# Patient Record
Sex: FEMALE | Race: ASIAN | NOT HISPANIC OR LATINO | Employment: PART TIME | URBAN - METROPOLITAN AREA
[De-identification: names, ages, dates, MRNs, and addresses within clinical notes are randomized per-mention and may not be internally consistent; named-entity substitution may affect disease eponyms.]

---

## 2017-01-26 ENCOUNTER — PRENATAL OFFICE VISIT (OUTPATIENT)
Dept: OBGYN | Facility: CLINIC | Age: 37
End: 2017-01-26
Payer: COMMERCIAL

## 2017-01-26 VITALS
HEIGHT: 62 IN | TEMPERATURE: 97 F | WEIGHT: 128.6 LBS | BODY MASS INDEX: 23.66 KG/M2 | DIASTOLIC BLOOD PRESSURE: 58 MMHG | HEART RATE: 74 BPM | SYSTOLIC BLOOD PRESSURE: 93 MMHG

## 2017-01-26 DIAGNOSIS — Z34.00 SUPERVISION OF NORMAL FIRST PREGNANCY, ANTEPARTUM: Primary | ICD-10-CM

## 2017-01-26 DIAGNOSIS — N89.8 VAGINAL DISCHARGE: ICD-10-CM

## 2017-01-26 DIAGNOSIS — B37.31 YEAST INFECTION OF THE VAGINA: ICD-10-CM

## 2017-01-26 DIAGNOSIS — R30.0 DYSURIA: ICD-10-CM

## 2017-01-26 LAB
ALBUMIN UR-MCNC: NEGATIVE MG/DL
APPEARANCE UR: CLEAR
BILIRUB UR QL STRIP: NEGATIVE
COLOR UR AUTO: YELLOW
GLUCOSE UR STRIP-MCNC: NEGATIVE MG/DL
HGB UR QL STRIP: NEGATIVE
KETONES UR STRIP-MCNC: NEGATIVE MG/DL
LEUKOCYTE ESTERASE UR QL STRIP: NEGATIVE
MICRO REPORT STATUS: ABNORMAL
NITRATE UR QL: NEGATIVE
PH UR STRIP: 6 PH (ref 5–7)
SP GR UR STRIP: 1.01 (ref 1–1.03)
SPECIMEN SOURCE: ABNORMAL
URN SPEC COLLECT METH UR: NORMAL
UROBILINOGEN UR STRIP-ACNC: 0.2 EU/DL (ref 0.2–1)
WET PREP SPEC: ABNORMAL

## 2017-01-26 PROCEDURE — 81003 URINALYSIS AUTO W/O SCOPE: CPT | Performed by: OBSTETRICS & GYNECOLOGY

## 2017-01-26 PROCEDURE — 99207 ZZC PRENATAL VISIT: CPT | Performed by: OBSTETRICS & GYNECOLOGY

## 2017-01-26 PROCEDURE — 87210 SMEAR WET MOUNT SALINE/INK: CPT | Performed by: OBSTETRICS & GYNECOLOGY

## 2017-01-26 RX ORDER — TERCONAZOLE 0.4 %
1 CREAM WITH APPLICATOR VAGINAL AT BEDTIME
Qty: 45 G | Refills: 0 | Status: SHIPPED | OUTPATIENT
Start: 2017-01-26 | End: 2017-02-02

## 2017-01-26 NOTE — PROGRESS NOTES
23w6d feeling ok overall, but for the past week or so she has noticed increased vaginal discharge, itching irritation and some burning with urination.  Wet prep: yeast, rx for terazol faxed.   UA: negative   glucola next visit.  RTC 4 weeks  veronika

## 2017-02-24 ENCOUNTER — HOSPITAL ENCOUNTER (OUTPATIENT)
Dept: ULTRASOUND IMAGING | Facility: CLINIC | Age: 37
Discharge: HOME | End: 2017-02-24
Attending: OBSTETRICS & GYNECOLOGY | Admitting: OBSTETRICS & GYNECOLOGY
Payer: COMMERCIAL

## 2017-02-24 ENCOUNTER — PRENATAL OFFICE VISIT (OUTPATIENT)
Dept: OBGYN | Facility: CLINIC | Age: 37
End: 2017-02-24
Payer: COMMERCIAL

## 2017-02-24 VITALS
BODY MASS INDEX: 24.31 KG/M2 | DIASTOLIC BLOOD PRESSURE: 62 MMHG | HEART RATE: 78 BPM | WEIGHT: 132.9 LBS | SYSTOLIC BLOOD PRESSURE: 92 MMHG | TEMPERATURE: 98.9 F

## 2017-02-24 DIAGNOSIS — Z34.00 SUPERVISION OF NORMAL FIRST PREGNANCY, ANTEPARTUM: ICD-10-CM

## 2017-02-24 DIAGNOSIS — M79.662 PAIN OF LEFT CALF: ICD-10-CM

## 2017-02-24 DIAGNOSIS — Z23 NEED FOR TDAP VACCINATION: Primary | ICD-10-CM

## 2017-02-24 DIAGNOSIS — Z34.00 SUPERVISION OF NORMAL FIRST PREGNANCY, ANTEPARTUM: Primary | ICD-10-CM

## 2017-02-24 LAB
GLUCOSE 1H P 50 G GLC PO SERPL-MCNC: 195 MG/DL (ref 60–129)
HGB BLD-MCNC: 10.5 G/DL (ref 11.7–15.7)
VZV IGG SER QL IA: 2.1 AI (ref 0–0.8)

## 2017-02-24 PROCEDURE — 93971 EXTREMITY STUDY: CPT | Mod: LT

## 2017-02-24 PROCEDURE — 82950 GLUCOSE TEST: CPT | Performed by: OBSTETRICS & GYNECOLOGY

## 2017-02-24 PROCEDURE — 36415 COLL VENOUS BLD VENIPUNCTURE: CPT | Performed by: OBSTETRICS & GYNECOLOGY

## 2017-02-24 PROCEDURE — 00000218 ZZHCL STATISTIC OBHBG - HEMOGLOBIN: Performed by: OBSTETRICS & GYNECOLOGY

## 2017-02-24 PROCEDURE — 86787 VARICELLA-ZOSTER ANTIBODY: CPT | Performed by: OBSTETRICS & GYNECOLOGY

## 2017-02-24 PROCEDURE — 90715 TDAP VACCINE 7 YRS/> IM: CPT | Performed by: OBSTETRICS & GYNECOLOGY

## 2017-02-24 PROCEDURE — 90472 IMMUNIZATION ADMIN EACH ADD: CPT | Performed by: OBSTETRICS & GYNECOLOGY

## 2017-02-24 PROCEDURE — 90471 IMMUNIZATION ADMIN: CPT | Performed by: OBSTETRICS & GYNECOLOGY

## 2017-02-24 PROCEDURE — 99213 OFFICE O/P EST LOW 20 MIN: CPT | Mod: 25 | Performed by: OBSTETRICS & GYNECOLOGY

## 2017-02-24 NOTE — MR AVS SNAPSHOT
After Visit Summary   2/24/2017    Liam Ellis    MRN: [id number]           Patient Information     Date Of Birth          1980        Visit Information        Provider Department      2/24/2017 9:30 AM Shahida Raphael MD [facility]        Today's Diagnoses     Need for Tdap vaccination    -  1    Supervision of normal first pregnancy, antepartum        Pain of left calf           Follow-ups after your visit        Your next 10 appointments already scheduled     Feb 24, 2017 11:00 AM CST   US LOWER EXTREMITY VENOUS DUPLEX LEFT with URUS1   [facility], Ultrasound ([facility])    [address] [address] [phone]           Please bring a list of your medicines (including vitamins, minerals and over-the-counter drugs). Also, tell your doctor about any allergies you may have. Wear comfortable clothes and leave your valuables at home.  You do not need to do anything special to prepare for your exam.  Please call the Imaging Department at your exam site with any questions.              Future tests that were ordered for you today     Open Future Orders        Priority Expected Expires Ordered    US Lower Extremity Venous Duplex Left Routine  2/24/2018 2/24/2017            Who to contact     If you have questions or need follow up information about today's clinic visit or your schedule please contact [facility] directly at [phone].  Normal or non-critical lab and imaging results will be communicated to you by MyChart, letter or phone within 4 business days after the clinic has received the results. If you do not hear from us within 7 days, please contact the clinic through MyChart or phone. If you have a critical or abnormal lab result, we will notify you by phone as soon as possible.  Submit refill requests through Tizra or call your pharmacy and they will forward the refill  request to us. Please allow 3 business days for your refill to be completed.          Additional Information About Your Visit        SurgiLighthart Information     Restlet gives you secure access to your electronic health record. If you see a primary care provider, you can also send messages to your care team and make appointments. If you have questions, please call your primary care clinic.  If you do not have a primary care provider, please call [phone] and they will assist you.        Care EveryWhere ID     This is your Care EveryWhere ID. This could be used by other organizations to access your [place] medical records  [id number]        Your Vitals Were     Pulse Temperature Last Period BMI (Body Mass Index)          78 98.9  F (37.2  C) (Oral) 08/12/2016 24.31 kg/m2         Blood Pressure from Last 3 Encounters:   02/24/17 92/62   01/26/17 93/58   12/30/16 (!) 89/61    Weight from Last 3 Encounters:   02/24/17 132 lb 14.4 oz (60.3 kg)   01/26/17 128 lb 9.6 oz (58.3 kg)   12/30/16 123 lb 6.4 oz (56 kg)              We Performed the Following     Glucose tolerance gest screen 1 hour     OB hemoglobin     TDAP (ADACEL AGES 11-64)     VACCINE ADMINISTRATION, INITIAL     Varicella Zoster Virus Antibody IgG        Primary Care Provider Office Phone # Fax #    ELLEN Stanford [phone]       [facility] [address]  [facility] [address]        Thank you!     Thank you for choosing [facility]  for your care. Our goal is always to provide you with excellent care. Hearing back from our patients is one way we can continue to improve our services. Please take a few minutes to complete the written survey that you may receive in the mail after your visit with us. Thank you!             Your Updated Medication List - Protect others around you: Learn how to safely use, store and throw away your medicines at www.disposemymeds.org.          This list is accurate as of:  2/24/17  9:42 AM.  Always use your most recent med list.                   Brand Name Dispense Instructions for use    prenatal multivitamin  plus iron 27-0.8 MG Tabs per tablet      Take 1 tablet by mouth daily

## 2017-02-24 NOTE — PROGRESS NOTES
28w0d feeling ok, but developed another bruise and tender lump on left calf.  When she was stretching during the night, she noticed pain, like a cramp in her left calf, in the morning, she had a bruise and a tender lump in that area and it is persisting.  She had this earlier in pregnancy, had doppler which ruled out DVT.  On exam, she has ecchymosis of left calf with tender cord.  Likely a superficial phlebitis, but recommend doppler today and she is set up to go over after glucola.  Good fm.  tdap today.  rtc 4 weeks  jlp

## 2017-02-25 ASSESSMENT — PATIENT HEALTH QUESTIONNAIRE - PHQ9: SUM OF ALL RESPONSES TO PHQ QUESTIONS 1-9: 6

## 2017-03-02 DIAGNOSIS — O24.419 GESTATIONAL DIABETES: Primary | ICD-10-CM

## 2017-03-02 DIAGNOSIS — Z34.00 SUPERVISION OF NORMAL FIRST PREGNANCY, ANTEPARTUM: ICD-10-CM

## 2017-03-02 PROCEDURE — 82952 GTT-ADDED SAMPLES: CPT | Performed by: OBSTETRICS & GYNECOLOGY

## 2017-03-02 PROCEDURE — 82951 GLUCOSE TOLERANCE TEST (GTT): CPT | Performed by: OBSTETRICS & GYNECOLOGY

## 2017-03-02 PROCEDURE — 36415 COLL VENOUS BLD VENIPUNCTURE: CPT | Performed by: OBSTETRICS & GYNECOLOGY

## 2017-03-03 LAB
GLUCOSE 1H P 100 G GLC PO SERPL-MCNC: 191 MG/DL (ref 60–179)
GLUCOSE 2H P 100 G GLC PO SERPL-MCNC: 185 MG/DL (ref 60–154)
GLUCOSE 3H P 100 G GLC PO SERPL-MCNC: 146 MG/DL (ref 60–139)
GLUCOSE P FAST SERPL-MCNC: 85 MG/DL (ref 60–94)

## 2017-03-07 ENCOUNTER — ALLIED HEALTH/NURSE VISIT (OUTPATIENT)
Dept: EDUCATION SERVICES | Facility: CLINIC | Age: 37
End: 2017-03-07
Payer: COMMERCIAL

## 2017-03-07 VITALS — WEIGHT: 138.6 LBS | HEIGHT: 63 IN | BODY MASS INDEX: 24.56 KG/M2

## 2017-03-07 DIAGNOSIS — O24.419 GDM (GESTATIONAL DIABETES MELLITUS): Primary | ICD-10-CM

## 2017-03-07 PROCEDURE — 99207 ZZC NO CHARGE LOS: CPT

## 2017-03-07 PROCEDURE — G0109 DIAB MANAGE TRN IND/GROUP: HCPCS

## 2017-03-07 NOTE — Clinical Note
Tristen Wyatt,  Sly MORALES that this GDM plans to follow up with you next Friday.  Thanks! Merline Matos RD, LD, CDE

## 2017-03-07 NOTE — PATIENT INSTRUCTIONS
1. Check blood sugar 4 times a day, before breakfast and 1 hour after the start of each meal.     2. Check urine ketones when you wake up every morning for 7 days. If negative everyday, reduce testing to once a week.    3. Follow the recommended meal plan: eat something every 2-3 hours, include protein/fat and carbohydrate at every meal and snack, have 30gm carbs at breakfast, 60gm carbs at lunch, 60gm carbs at supper, 15-30 gm carbs at 3 snacks per day.     4. Add activity to every day, try walking or being active after each meal to help control blood sugar levels.    5.Call or e-mail educator if 3 or more blood sugars are above goal in 1 week. Call or e-mail with questions or concerns.      FOLLOW UP: Friday, March 17th at 9am at [facility]    Merline Matos RD, LD, CDE   [phone]    [place] [facility] for the [facility]:  For Your Diabetes Education or Nutrition Appointments Call:  [phone]   For Diabetes Education and Nutrition Related Questions:   Phone: [phone]  E-mail: [email or web address]  Fax: [phone]   If you need a medication refill please contact your pharmacy. Please allow 3 business days for your refills to be completed.     Instructions for emailing to the Diabetes Educator    If you need to communicate a non-urgent message with a Diabetes Educator via email, please send to [email or web address].    Please follow the following email guidelines:    Subject line: Secure: clinic name  Body of email include: First name, middle initial, last name and date of birth.    We will be in touch with you within one (1) business days.

## 2017-03-07 NOTE — PROGRESS NOTES
"Diabetes Self-Management Training - Gestational Diabetes    SUBJECTIVE/OBJECTIVE:  Liam Ellis presents today for education related to gestational diabetes.  She is accompanied by self    Patient's gestational diabetes management related comments/concerns: none noted    Patient's emotional response to diabetes: expresses readiness to learn    Ht 5' 2.5\" (1.588 m)  Wt 138 lb 9.6 oz (62.9 kg)  LMP 2016  BMI 24.95 kg/m2    Pre pregnancy weight: 115#    Weight gain 23.6 lbs at 29.5 weeks gestation.    Estimated Date of Delivery: May 19, 2017    Lab Results   Component Value Date    GLC 85 2011       1 hour OGTT: 195 on 17  3 hour OGTT: Fastin; 1 hr: 191; 2 hr: 185, 3 hr: 146 on 3/2/17    History   Smoking Status     Never Smoker   Smokeless Tobacco     Never Used       Lifestyle and Health Behaviors:  Physical Activity: no regular exercise program during pregnancy.  Walks in summer for 30 minutes, 3-4 days a week.    Nutrition:  Patient eats 3 meals and 1 snacks per day.    Wakes: 10am  Breakfast:  10am: 2 bagel with cream cheese spread, 1 cup Mirlande with milk and sugar  Snack:  none  Lunch:  1:30pm: rice, potato beef and 1 cup OJ  Snack:  none  Dinner:  8pm-12am: rice, eggplant, 1/2 cup mirlande with milk and sugar, water  Bedtime Snack: 2:30am: none if eats at midnight OR  Banana and piece of cookie    Other time(s) food is eaten? 2:30am if hungry     Beverages: OJ 1 cup/day, Mirlande sweetened Tea  1-2 cups/day and Water all day    Cultural/Buddhism diet restrictions: yes- no pork or lamb during pregnancy     Biggest challenge to healthy eating is:  Eating fruit    Pre- Vitamin: Yes    Supplements: No   Experiencing nausea?  No     Socio/Economic considerations:  Support System: spouse/significant other    Health Beliefs and Attitudes:   Stage of Change: PREPARATION (Decided to change - considering how)    ASSESSMENT:  Reviewed target BG values, sharps disposal, diagnosis criteria for GDM and " importance of good BG management for health of mom and baby. Patient advised to call if 3 BG elevated before returns next week. Instructed on ketone checking and told her to call if unable to get ketones negative.      Discussed carbohydrate sources and impact on BG. Reviewed basics of healthy eating and incorporating a variety of foods into meal plan. Instructed on carbohydrate counting and label reading and recommended patient consume 2 CHO for breakfast, 4 CHO for lunch and dinner and 1-2 CHO for each snack, 3 snacks a day. This meal plan will provide 13-16 CHO/day so informed patient to call if struggling since may be able to adjust meal plan if needed.  Used food models to help demonstrate portion control and answered food related questions/examples. Discussed importance of not going too low in CHO since that may cause liver to produce excess glucose and contribute to elevated BG readings and ketone formation. Encouraged eating breakfast within 1 hour of waking.  To also help prevent against ketone formation, protein was encouraged with meals and snacks, especially with the night snack. Reviewed benefits of exercising to help lower BG and encouraged 30 minutes a day as tolerated and per MD approval, and to target exercise after meals if feel consumed too many CHO or having problem with BG being elevated after a meal. Pt verbalized understanding of concepts discussed and recommendations provided.    Estimated Energy Needs: 1848 kcal/day (12-15 CHO)      INTERVENTION:  Patient was instructed on One Touch Verio Flex meter and was able to provide an accurate return demonstration. Patient's blood glucose reading today was 135 mg/dL, 2 hours after eating cereal and drinking Jose for breakfast.    Educational topics covered today:  GDM diagnosis, pathophysiology, Risks and Complications of GDM, Means of controlling GDM, Using a Blood Glucose Monitor, Blood Glucose Goals, Logging and Interpreting Glucose Results,  Ketone Testing, When to Call a Diabetes Educator or OB Provider, Healthy Eating During Pregnancy, Counting Carbohydrates, Meal Planning for GDM, and Physical Activity    Educational materials provided today:   Yuly Understanding Gestational Diabetes  GDM Log Book  Sharps Disposal  Care After Delivery  One Touch Verio Flex meter kit    Pt verbalized understanding of concepts discussed and recommendations provided today.     PLAN:  Check glucose 4 times daily, before breakfast and 1 hour after each meal.     Check Ketones daily for one week, if negative, reduce testing to once a week.     Physical activity recommended: after meals as tolerated and per MD approval if seeing high blood glucose.    Meal plan: 2 carbs at breakfast, 4 carbs at lunch, 4 carbs at supper, 1-2 carbs at 3 snacks a day.  Follow consistent CHO meal plan, eat CHO and protein/fat at all meals/snacks.    Call/e-mail/MyChart message diabetes educator if 3 or more blood sugars are above the goal in 1 week or if ketones are positive.     Call/e-mail/MyChart message with questions/concerns.    FOLLOW-UP:  Call or e-mail educator if 3 or more blood sugars are above goal in 1 week.  Appointment scheduled on 3/17/17.   EMAIL: [email or web address]    Merline Matos RD, LUIS ANTONIO, CDE   Time Spent: 130 minutes  Encounter type: Group class

## 2017-03-07 NOTE — MR AVS SNAPSHOT
After Visit Summary   3/7/2017    Liam Ellis    MRN: [id number]           Patient Information     Date Of Birth          1980        Visit Information        Provider Department      3/7/2017 9:00 AM BK GDM [facility]        Care Instructions    1. Check blood sugar 4 times a day, before breakfast and 1 hour after the start of each meal.     2. Check urine ketones when you wake up every morning for 7 days. If negative everyday, reduce testing to once a week.    3. Follow the recommended meal plan: eat something every 2-3 hours, include protein/fat and carbohydrate at every meal and snack, have 30gm carbs at breakfast, 60gm carbs at lunch, 60gm carbs at supper, 15-30 gm carbs at 3 snacks per day.     4. Add activity to every day, try walking or being active after each meal to help control blood sugar levels.    5.Call or e-mail educator if 3 or more blood sugars are above goal in 1 week. Call or e-mail with questions or concerns.      FOLLOW UP: Friday, March 17th at 9am at [facility]    Merline Matos RD, LD, CDE   [phone]    [place] [facility] Services for the [facility]:  For Your Diabetes Education or Nutrition Appointments Call:  [phone]   For Diabetes Education and Nutrition Related Questions:   Phone: [phone]  E-mail: [email or web address]  Fax: [phone]   If you need a medication refill please contact your pharmacy. Please allow 3 business days for your refills to be completed.     Instructions for emailing to the Diabetes Educator    If you need to communicate a non-urgent message with a Diabetes Educator via email, please send to [email or web address].    Please follow the following email guidelines:    Subject line: Secure: clinic name  Body of email include: First name, middle initial, last name and date of birth.    We will be in touch with you within one (1) business days.          Follow-ups after  your visit        Your next 10 appointments already scheduled     Mar 17, 2017  9:00 AM CDT   Diabetic Education with [facility] DIABETIC ED RESOURCE   [facility] ([facility])    [address] [address]   [phone]            Mar 28, 2017 11:15 AM CDT   ESTABLISHED PRENATAL with Shahida [facility]hael MD   [facility]    [address] [place] [address] [address]  [facility] [address]   [phone]            Apr 14, 2017 11:15 AM CDT   ESTABLISHED PRENATAL with Shahida Raphael MD   [facility]    [address] [place] [address] [address]  [facility] [address]   [phone]            Apr 28, 2017 11:30 AM CDT   ESTABLISHED PRENATAL with Shahida Raphael MD   [facility]    [address] [place] [address] [address]  [facility] [address]   [phone]            May 12, 2017 11:30 AM CDT   ESTABLISHED PRENATAL with Shahida Raphael MD   [facility]    [address] [place] [address] [address]  [facility] [address]   [phone]              Who to contact     If you have questions or need follow up information about today's clinic visit or your schedule please contact [facility] directly at [phone].  Normal or non-critical lab and imaging results will be communicated to you by MyChart, letter or phone within 4 business days after the clinic has received the results. If you do not hear from us within 7 days, please contact the clinic through Productivhart or phone. If you have a critical or abnormal lab result, we will notify you by phone as soon as possible.  Submit refill requests through ClusterFlunk or call your pharmacy and they will forward the refill request to us. Please allow 3 business days for your refill to be completed.          Additional Information About Your Visit         BioNanovations Information     Bio[employer]ovations gives you secure access to your electronic health record. If you see a primary care provider, you can also send messages to your care team and make appointments. If you have questions, please call your primary care clinic.  If you do not have a primary care provider, please call [phone] and they will assist you.        Care EveryWhere ID     This is your Care EveryWhere ID. This could be used by other organizations to access your [facility] medical records  [id number]        Your Vitals Were     Last Period                   08/12/2016            Blood Pressure from Last 3 Encounters:   02/24/17 92/62   01/26/17 93/58   12/30/16 (!) 89/61    Weight from Last 3 Encounters:   02/24/17 132 lb 14.4 oz (60.3 kg)   01/26/17 128 lb 9.6 oz (58.3 kg)   12/30/16 123 lb 6.4 oz (56 kg)              Today, you had the following     No orders found for display       Primary Care Provider Office Phone # Fax #    ELLEN Stanford [phone]       [facility] [address]  [facility] [address]        Thank you!     Thank you for choosing [facility]  for your care. Our goal is always to provide you with excellent care. Hearing back from our patients is one way we can continue to improve our services. Please take a few minutes to complete the written survey that you may receive in the mail after your visit with us. Thank you!             Your Updated Medication List - Protect others around you: Learn how to safely use, store and throw away your medicines at www.disposemymeds.org.          This list is accurate as of: 3/7/17 11:09 AM.  Always use your most recent med list.                   Brand Name Dispense Instructions for use    prenatal multivitamin  plus iron 27-0.8 MG Tabs per tablet      Take 1 tablet by mouth daily

## 2017-03-17 ENCOUNTER — ALLIED HEALTH/NURSE VISIT (OUTPATIENT)
Dept: EDUCATION SERVICES | Facility: CLINIC | Age: 37
End: 2017-03-17
Payer: COMMERCIAL

## 2017-03-17 VITALS — WEIGHT: 138.6 LBS | BODY MASS INDEX: 24.95 KG/M2

## 2017-03-17 DIAGNOSIS — O24.419 GDM (GESTATIONAL DIABETES MELLITUS): Primary | ICD-10-CM

## 2017-03-17 PROCEDURE — 99207 ZZC NO CHARGE LOS: CPT

## 2017-03-17 PROCEDURE — G0108 DIAB MANAGE TRN  PER INDIV: HCPCS

## 2017-03-17 NOTE — PROGRESS NOTES
Gestational Diabetes Follow-up Visit    SUBJECTIVE/OBJECTIVE:  Liam Ellis presents today for education and evaluation of glucose control related to gestational diabetes.  She is accompanied by self.  Patient's gestational diabetes management related comments/concerns: reports numbers all are over the place.     LMP 2016     Currently at 31w0d     Wt Readings from Last 5 Encounters:   17 138 lb 9.6 oz (62.9 kg)   17 138 lb 9.6 oz (62.9 kg)   17 132 lb 14.4 oz (60.3 kg)   17 128 lb 9.6 oz (58.3 kg)   16 123 lb 6.4 oz (56 kg)     Blood Glucose/Ketone Log:  Bold = above target  Date Ketones Fasting Post Breakfast Post Lunch Post Supper   3/7   135* 143 111   3/8 0 83 153 125 170   3/9 5 89 170 121 178   3/10 5 83 133 183 139   3/11 5 89 139 115* 127   3/12 5 93 180 113 164   3/13 5 89 191 145 144   3/14 5 81 195 135* 140   3/15 5 84 178 154 135*   3/16 5 90 157 157 129*   3/17 5 91      Fastin% in target  After breakfast: 20% in target  After lunch: 40% in target  After dinner: 40% in target    Current gestational diabetes management:  Taking medications for gestational diabetes? No    Physical Activity:  Tried walking one day and BG did decrease.  Doesn't like walking in the cold.  Discussed ideas for indoor movement (stairs, cleaning, stretching etc) and it is going to start warming up so walking around may be more feasible.      Nutrition:      Patient has not started fully following recommended meal plan (has made a few changes), has an inconsistent intake of carbohydrates and continues to drink sugar-sweetened beverages.   Many areas on food logs for potential changes.       - Patient has been eating cereal & milk OR pancakes with (? SF syrup versus light) at breakfast and seeing higher numbers from this.   -  Has meals with both potatoes & rice, which are likely 80gm carb+.  (Patient has 15 grams of carb written on log next to rice, but described it was bigger than a  cup).  -  With meals of more vegetables, meat and a controlled portion of starch the glucose is in target or close  -  has been skipping 1-2 snacks daily.  Will try decreasing carb at a meal and putting it into a snack instead.   -  drinks sweetened mirlande tea at times and may cut amount in half.   -  will decrease to 2 pieces of bread versus 3-4     ASSESSMENT:  Recommended a few different diet changes that could help bring meal times into target as noted above.  Carb intake is sometimes well above recommended 45-60gm/meal, but then frequently skips snacks.  Encouraged patient try the above changes and follow up after 3-4 days of putting this into practice.   If diet/activity changes do not work, then patient may need meal time insulin and discussed this.     Insulin injection technique taught using a Vial and Syringe & Pen for clear & cloudy insulin (Humalog and NPH). Patient verbalized understanding and was able to perform an accurate return demonstration of injection technique.  Discussed mixing insulin, storage, sharps, new needle each use, site selection, action of insulin and hypoglycemia signs, symptoms and treatment.       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 Reviewed hypoglycemia.      Gave patient the Indira vouvher if needed for insulin.       Health Beliefs and Attitudes:   Stage of Change: ACTION (Actively working towards change)    INTERVENTION:  Educational topics covered today:  What to expect after delivery, Future testing for Type 2 diabetes (2 hour OGTT at 6 week post-partum check-up and annual fasting blood glucose level), Risk of GDM and planning ahead for future pregnancies, Recommended lifestyle interventions for reducing the risk of Type 2 Diabetes, When to Call a Diabetes Educator or OB Provider    Educational Materials provided today:  Yuly Preventing Diabetes    PLAN:  Check glucose 4 times daily.  Check ketones once a week when readings are consistently  negative.  Continue with recommended physical activity.  Continue to follow recommended meal plan: 2-3 carbs at breakfast, 3-4 carbs at lunch, 3-4 carbs at supper, 1-2 carbs at snacks.  Follow consistent CHO meal plan, eat CHO and protein/fat at all meals/snacks.    Call/e-mail/MyChart message diabetes educator if 3 or more blood sugars are above the goal in 1 week or if ketones are positive.     FOLLOW-UP:  Follow up with diabetes educator in 3-4 (e-mail on Monday or Tuesday) .  Call or e-mail educator if 3 or more blood sugars are above goal in 1 week.  Call or e-mail with questions or concerns.    Call/e-mail/MyChart message diabetes educator if 3 or more blood sugars are above the goal in 1 week or if ketones are positive.     Yasmine Crystal RD, LD   Diabetes Education    Time spent was 60 minutes  Encounter type: Individual    Any diabetes medication dose changes were made via the CDE Protocol and Collaborative Practice Agreement with the patient's referring provider. A copy of this encounter was shared with the provider.

## 2017-03-17 NOTE — MR AVS SNAPSHOT
"              After Visit Summary   3/17/2017    Liam Ellis    MRN: [id number]           Patient Information     Date Of Birth          1980        Visit Information        Provider Department      3/17/2017 9:00 AM  DIABETIC ED RESOURCE [facility]        Care Instructions    Taking Insulin instructions    - Do a 2 unit \"prime\" before each injection, be sure a stream of insulin comes out of the needle before you give your injection. After you inject, hold the needle under the skin to the count of 10 to be sure all of the insulin goes in.     - Rotate injection sites, keeping at least 1 inch apart from last injection site and 2 inches away from belly button or surgical scars.    - Pen - Use a new pen needle for each injection. Always remove pen needle from the insulin pen after use and do not store insulin pens with the needle on the pen.     - Store insulin you are not using in the refrigerator (do not freeze). Take new insulin out of the refrigerator a few hours prior to use to bring to room temperature.      - Always carry your blood sugar meter and a sugar source like glucose tablets with you in case of a low blood sugar. Treat a low blood sugar (less than 70) with 15 grams of carbohydrate (1 carb choice). Wait 15 minutes, recheck blood sugar. If blood sugar is still below 70, repeat the treatment.    -  Call your doctor or diabetes educator if you begin having low blood sugars or if you have questions or concerns.       [place] [facility] for the [facility]:  For Your Diabetes Education or Nutrition Appointments Call:  [phone]   For Nutrition Related Questions Call:   Phone: [phone]  E-mail: [email or web address]  Fax: [phone]   If you need a medication refill please contact your pharmacy. Please allow 3 business days for your refills to be completed.     [email or web address]    Here are some ideas for breakfast or bedtime " snack. Pick a carbohydrate from the right and a protein from the left. If you have carbohydrates 30 grams of total carbohydrate and 3-5 grams of fiber that is even better.   Fruits are not listed as they can cause the blood sugar to raise blood sugar quickly and be used up early in the night. Fruits are better at meals, or morning or afternoon snack.     Protein/Fat list (about 14 grams of protein or 2 fat servings) Carbohydrate list (about 30 grams of carbohydrate)   2 slices of cheese English Muffin   2 TBS Peanut butter/[place] butter/Sun butter 10 crackers     cup Cottage cheese 2% 2 pieces of toast   2 oz any cooked meat - less than deck of cards size 1 hamburger or hot dog bun   1.5 oz of soy nuts 1 whole wheat ritika   Avocado or guacamole 6 emmanuel cracker squares     cup tuna - can add mayonnaise 1 cup full fat ice cream - no candy or sauce   2 eggs - boiled, poached, fried, scrambled, omelet 30 chips   1 veggie leatha (might have carbohydrates also) Greek yogurt - 30 grams of carbohydrate   2 TBS Coconut 2 - 6 inch tortillas corn or flour   12 shrimp - not breaded 2 toaster waffles - no syrup   2-1oz meatballs   bagel   2 Tbs cream cheese - plain, veggie, salmon - no fruit or honey. 6 cups popcorn - unsweetened     cup of nuts Granola bar of 3o grams of carbohydrate   10 olives 1 cup of unsweetened lentils or beans.    Tofu or Temph 4-5oz 1 cup potato salad     You can always add vegetables with dip, salad dressing or salsa also.           Follow-ups after your visit        Your next 10 appointments already scheduled     Mar 28, 2017 11:15 AM CDT   ESTABLISHED PRENATAL with Shahida Raphael MD   [facility] ([facility]    [address] [place] [address] [phone]            Apr 14, 2017 11:15 AM CDT   ESTABLISHED PRENATAL with Shahida Raphael MD   [facility] ([facility])    [address] [place] [address]  [address]  [facility] [address]   [phone]            Apr 28, 2017 11:30 AM CDT   ESTABLISHED PRENATAL with Shahida Raphael MD   [facility] ([facility])    [address] [place] [address] [address]  [facility] [address]   [phone]            May 12, 2017 11:30 AM CDT   ESTABLISHED PRENATAL with Shahida Raphael MD   [facility] ([facility])    [address] [place] [address] [address]  [facility] [address]   [phone]              Who to contact     If you have questions or need follow up information about today's clinic visit or your schedule please contact [facility] CHANDRIKA MCCAULEY directly at [phone].  Normal or non-critical lab and imaging results will be communicated to you by [employer], letter or phone within 4 business days after the clinic has received the results. If you do not hear from us within 7 days, please contact the clinic through [employer] or phone. If you have a critical or abnormal lab result, we will notify you by phone as soon as possible.  Submit refill requests through [employer] or call your pharmacy and they will forward the refill request to us. Please allow 3 business days for your refill to be completed.          Additional Information About Your Visit        [employer] Information     [employer] gives you secure access to your electronic health record. If you see a primary care provider, you can also send messages to your care team and make appointments. If you have questions, please call your primary care clinic.  If you do not have a primary care provider, please call [phone] and they will assist you.        Care EveryWhere ID     This is your Care EveryWhere ID. This could be used by other organizations to access your [place] medical records  [id number]        Your Vitals Were     Last Period BMI (Body Mass Index)                08/12/2016 24.95 kg/m2           Blood Pressure from Last 3 Encounters:   02/24/17  92/62   01/26/17 93/58   12/30/16 (!) 89/61    Weight from Last 3 Encounters:   03/17/17 138 lb 9.6 oz (62.9 kg)   03/07/17 138 lb 9.6 oz (62.9 kg)   02/24/17 132 lb 14.4 oz (60.3 kg)              Today, you had the following     No orders found for display       Primary Care Provider Office Phone # Fax #    Ana Rosa ACEVES Candice APRAHMET [phone]       [facility] [address]  [facility] [address]        Thank you!     Thank you for choosing [facility]  for your care. Our goal is always to provide you with excellent care. Hearing back from our patients is one way we can continue to improve our services. Please take a few minutes to complete the written survey that you may receive in the mail after your visit with us. Thank you!             Your Updated Medication List - Protect others around you: Learn how to safely use, store and throw away your medicines at www.disposemymeds.org.          This list is accurate as of: 3/17/17  9:31 AM.  Always use your most recent med list.                   Brand Name Dispense Instructions for use    acetone (Urine) test Strp     20 each    Use to check urine for ketones every morning.       blood glucose monitoring lancets     1 Box    Use to test blood sugars 4 times daily.       blood glucose monitoring test strip    ONE TOUCH VERIO IQ    150 strip    Use to test blood sugars 4 times daily (before breakfast and 1 hour after start of each meal).       prenatal multivitamin  plus iron 27-0.8 MG Tabs per tablet      Take 1 tablet by mouth daily

## 2017-03-17 NOTE — PATIENT INSTRUCTIONS
"Taking Insulin instructions    - Do a 2 unit \"prime\" before each injection, be sure a stream of insulin comes out of the needle before you give your injection. After you inject, hold the needle under the skin to the count of 10 to be sure all of the insulin goes in.     - Rotate injection sites, keeping at least 1 inch apart from last injection site and 2 inches away from belly button or surgical scars.    - Pen - Use a new pen needle for each injection. Always remove pen needle from the insulin pen after use and do not store insulin pens with the needle on the pen.     - Store insulin you are not using in the refrigerator (do not freeze). Take new insulin out of the refrigerator a few hours prior to use to bring to room temperature.      - Always carry your blood sugar meter and a sugar source like glucose tablets with you in case of a low blood sugar. Treat a low blood sugar (less than 70) with 15 grams of carbohydrate (1 carb choice). Wait 15 minutes, recheck blood sugar. If blood sugar is still below 70, repeat the treatment.    -  Call your doctor or diabetes educator if you begin having low blood sugars or if you have questions or concerns.       [place] [facility] Nutrition [facility] for the [facility]:  For Your Diabetes Education or Nutrition Appointments Call:  [phone]   For Nutrition Related Questions Call:   Phone: [phone]  E-mail: [email or web address]  Fax: [phone]   If you need a medication refill please contact your pharmacy. Please allow 3 business days for your refills to be completed.     [email or web address]    Here are some ideas for breakfast or bedtime snack. Pick a carbohydrate from the right and a protein from the left. If you have carbohydrates 30 grams of total carbohydrate and 3-5 grams of fiber that is even better.   Fruits are not listed as they can cause the blood sugar to raise blood sugar quickly and be used up early in the night. Fruits are better " at meals, or morning or afternoon snack.     Protein/Fat list (about 14 grams of protein or 2 fat servings) Carbohydrate list (about 30 grams of carbohydrate)   2 slices of cheese English Muffin   2 TBS Peanut butter/[place] butter/Sun butter 10 crackers     cup Cottage cheese 2% 2 pieces of toast   2 oz any cooked meat - less than deck of cards size 1 hamburger or hot dog bun   1.5 oz of soy nuts 1 whole wheat ritika   Avocado or guacamole 6 emmanuel cracker squares     cup tuna - can add mayonnaise 1 cup full fat ice cream - no candy or sauce   2 eggs - boiled, poached, fried, scrambled, omelet 30 chips   1 veggie leatha (might have carbohydrates also) Greek yogurt - 30 grams of carbohydrate   2 TBS Coconut 2 - 6 inch tortillas corn or flour   12 shrimp - not breaded 2 toaster waffles - no syrup   2-1oz meatballs   bagel   2 Tbs cream cheese - plain, veggie, salmon - no fruit or honey. 6 cups popcorn - unsweetened     cup of nuts Granola bar of 3o grams of carbohydrate   10 olives 1 cup of unsweetened lentils or beans.    Tofu or Temph 4-5oz 1 cup potato salad     You can always add vegetables with dip, salad dressing or salsa also.

## 2017-03-22 ENCOUNTER — VIRTUAL VISIT (OUTPATIENT)
Dept: EDUCATION SERVICES | Facility: CLINIC | Age: 37
End: 2017-03-22

## 2017-03-22 DIAGNOSIS — O24.419 GESTATIONAL DIABETES MELLITUS, ANTEPARTUM: Primary | ICD-10-CM

## 2017-03-22 NOTE — PROGRESS NOTES
Gestational Diabetes Follow-up    Subjective/Objective:    Liam Moiseamykianna sent in blood glucose log for review. Last date of communication was: 3/17/17.    Gestational diabetes is being managed with diet and activity    Estimated Date of Delivery: May 19, 2017    BG/Food Log:       Assessment:    Ketones:trace   Fasting blood glucoses: 80% in target.  After breakfast: 60% in target.  After lunch: 60% in target.  After dinner: 80% in target.    Plan/Response:    Tristen Wheeler,  My name is Emily and I am answering the e-mails for diabetes education today.  Thanks for sending in your numbers!    It looks like they have improved since you met with Yoselin.  Good work!  It looks like you are most consistently high  after meals.  I suspect you have made some of the dietary changes that were suggested and it is working.  Watching the carbs  at meals and paying attention to which foods make your blood sugar go up and cutting back on them can be helpful to   keep post meal blood sugars to goal.    Keep going and let us know if you have any questions about carb counting (or anything else).    We will not change anything now.  Please send your numbers on Monday 3/27 or sooner if you have more than 3 blood sugars above target in one week.    Thanks!  Emily Wheeler,    It's Emily again.  I forgot to address your ketones. Sorry!    I am sending you some information about ketones.  See below.  To avoid them be sure that you are eating small frequent meals and snacks.  Be sure you take a bedtime snack of 30 grams of carb and some protein.  You could also try 8 ounces of milk during the night if you wake up (to use the bathroom).  I am attach a list of good bedtime snacks.    Thanks!  Emily    What causes ketones to form?  1. Not enough calories. Typical to lose weight also.   2. Cutting back too much on carbohydrates.    -This could be from having only meat, vegetables and/or fat at dinner.   -Skipping bedtime snack. If you fall  asleep before bedtime snack and wake to go  to the bathroom you may want to have 6-8 oz of milk.   -Going too long without food - skipped or delayed meals or snacks. If dinner is going to be late. Please flip your bedtime snack and dinner. For example noon lunch, snack at 2:00 pm, snack at 5:00 pm, dinner at 8:00 pm. If not able to eat a meal at least have a small snack of 30 grams of carbohydrate and 14 grams of protein.   -Increase fiber in bedtime snack carbohydrate. Milk, fruit, dry cereal, and some processed carbohydrates can be used quickly by the body, leaving you without carbohydrate later in the night. Your brain and central nervous system need carbohydrate so the liver will break down other nutrients to make carbohydrate and ketones are released in that process. Make sure your carbohydrates have the words whole grain and have 3-5 grams of fiber per serving.   -Add fat and/or protein to your bedtime snack. The protein in milk and yogurt do not slow down digestion. You may need to add peanut butter, sun butter, almond butter, cheese, cottage cheese, avocado, guacamole, coconut, olives, meat, egg cooked in oil or butter.   -If you go a long time between bedtime snack and breakfast you may need a glass of milk or small carbohydrate in the middle of the night.   3. Dehydration - make sure you are drinking enough fluids like water.   4. Illness  5. Uncontrolled blood sugars - you might need medication.           Any diabetes medication dose changes were made via the CDE Protocol and Collaborative Practice Agreement with the patient's referring provider. A copy of this encounter was shared with the provider.

## 2017-03-22 NOTE — MR AVS SNAPSHOT
After Visit Summary   3/22/2017    Liam Ellis    MRN: [id number]           Patient Information     Date Of Birth          1980        Visit Information        Provider Department      3/22/2017 9:00 AM [place] DIABETES ED RESOURCE [facility]        Today's Diagnoses     Gestational diabetes mellitus, antepartum    -  1       Follow-ups after your visit        Your next 10 appointments already scheduled     Mar 28, 2017 11:15 AM CDT   ESTABLISHED PRENATAL with Shahida Raphael MD   [facility] ([facility])    [address] [place] [address]   [phone]            Apr 14, 2017 11:15 AM CDT   ESTABLISHED PRENATAL with Shahida Raphael MD   [facility] ([facility])    [address] [place] [address]   [phone]            Apr 28, 2017 11:30 AM CDT   ESTABLISHED PRENATAL with Shahida Raphael MD   [facility] ([facility])    [address] [place] [address] [phone]            May 12, 2017 11:30 AM CDT   ESTABLISHED PRENATAL with Shahida Raphael MD   [facility] ([facility])    [address] [place] [address] [phone]              Who to contact     If you have questions or need follow up information about today's clinic visit or your schedule please contact [facility] directly at [phone].  Normal or non-critical lab and imaging results will be communicated to you by MyChart, letter or phone within 4 business days after the clinic has received the results. If you do not hear from us within 7 days, please contact the clinic through [employer] or phone. If you have a critical or abnormal lab result, we will notify you by phone as soon as possible.  Submit refill requests through [employer] or call your pharmacy and they will  forward the refill request to us. Please allow 3 business days for your refill to be completed.          Additional Information About Your Visit        [employer] Information     [employer] gives you secure access to your electronic health record. If you see a primary care provider, you can also send messages to your care team and make appointments. If you have questions, please call your primary care clinic.  If you do not have a primary care provider, please call [phone] and they will assist you.        Care EveryWhere ID     This is your Care EveryWhere ID. This could be used by other organizations to access your [place] medical records  [id number]        Your Vitals Were     Last Period                   08/12/2016            Blood Pressure from Last 3 Encounters:   02/24/17 92/62   01/26/17 93/58   12/30/16 (!) 89/61    Weight from Last 3 Encounters:   03/17/17 62.9 kg (138 lb 9.6 oz)   03/07/17 62.9 kg (138 lb 9.6 oz)   02/24/17 60.3 kg (132 lb 14.4 oz)              Today, you had the following     No orders found for display       Primary Care Provider Office Phone # Fax #    ELLEN Stanford [phone]       [facility] [address]  [facility] [address]        Thank you!     Thank you for choosing [facility]  for your care. Our goal is always to provide you with excellent care. Hearing back from our patients is one way we can continue to improve our services. Please take a few minutes to complete the written survey that you may receive in the mail after your visit with us. Thank you!             Your Updated Medication List - Protect others around you: Learn how to safely use, store and throw away your medicines at www.disposemymeds.org.          This list is accurate as of: 3/22/17  9:27 AM.  Always use your most recent med list.                   Brand Name Dispense Instructions for use    acetone (Urine) test Strp     20 each    Use to check urine for  ketones every morning.       blood glucose monitoring lancets     1 Box    Use to test blood sugars 4 times daily.       blood glucose monitoring test strip    ONE TOUCH VERIO IQ    150 strip    Use to test blood sugars 4 times daily (before breakfast and 1 hour after start of each meal).       prenatal multivitamin  plus iron 27-0.8 MG Tabs per tablet      Take 1 tablet by mouth daily

## 2017-03-27 ENCOUNTER — TELEPHONE (OUTPATIENT)
Dept: EDUCATION SERVICES | Facility: CLINIC | Age: 37
End: 2017-03-27

## 2017-03-27 ENCOUNTER — VIRTUAL VISIT (OUTPATIENT)
Dept: EDUCATION SERVICES | Facility: CLINIC | Age: 37
End: 2017-03-27
Payer: COMMERCIAL

## 2017-03-27 DIAGNOSIS — O24.419 GDM (GESTATIONAL DIABETES MELLITUS): Primary | ICD-10-CM

## 2017-03-27 PROCEDURE — 99207 ZZC NO BILLABLE SERVICE THIS VISIT: CPT

## 2017-03-27 NOTE — TELEPHONE ENCOUNTER
Gestational Diabetes Follow-up    Subjective/Objective:    Liam Ellis sent in blood glucose log for review. Last date of communication was: 3/22/17.    Gestational diabetes is being managed with diet and activity    Estimated Date of Delivery: May 19, 2017    BG/Food Log:             Assessment:    Ketones:trace.   Fasting blood glucoses: 100% in target.  After breakfast: 60% in target.  After lunch: 60% in target.  After dinner: 80% in target.    Plan/Response:  Continue to work on GDM meal plan and light activity after meals as possible.  Hard to read food record but appears hit or miss with blood glucose in target after eating rice.  No portion sizes listed but will recommend trying 3/4 cup when eating rice.  Will ask what she ate on days blood glucose high after breakfast.  Improved control after dinner.  Will also encourage small morning snack.    Follow-up in 3-4 days.    Email to patient:    Tristen Dumontn,    Catracho for sending in your food record and blood glucose and ketone values today!  It looks like you are still seeing some higher blood sugars after meals.  I am sorry but the food record was hard to read at some meals.  Could you please let me know what you had to eat for breakfast the past 3 mornings?    Otherwise, I can sort of make out most lunch and dinner meals.      Some thoughts:  1) It looks like meals with rice is where you most often see a higher blood sugar.  How much rice are you currently eating at lunch or dinner?  If you are doing more than 1 cup, I would recommend reducing to 1 cup or possibly 3/4 cup to see if it works better for you.    2) Are there other whole grains you would be able to try (farro, bulgur, quinoa) or making a mixture of brown and white/wild rice?  Sometime whole grains are better tolerated by moms.  When you eat a rice vernon, do you make with yogurt?  This may be providing extra carbs so you may want to eat less rice with these meals.    3) It looks like light  activity and eating a morning snack may also help with better blood sugar control around lunch.     Please let me know if you have any questions.  Otherwise, again, if you can please write out breakfast the past 3 mornings, I can look to see if we can find any possible trends with that too!    Thanks Merline Wheeler RD, LD, CDE

## 2017-03-27 NOTE — MR AVS SNAPSHOT
After Visit Summary   3/27/2017    Liam Ellis    MRN: [id number]           Patient Information     Date Of Birth          1980        Visit Information        Provider Department      3/27/2017 10:15 AM BK DIABETIC ED RESOURCE [facility]        Today's Diagnoses     GDM (gestational diabetes mellitus)    -  1       Follow-ups after your visit        Your next 10 appointments already scheduled     Mar 28, 2017 11:15 AM CDT   ESTABLISHED PRENATAL with Shahida Raphael [place]   [facility] [place]    [address] [place] [address]   [phone]            Apr 14, 2017 11:15 AM CDT   ESTABLISHED PRENATAL with Shahida Raphael MD   [facility] [place]    [address] [place] [address]   [phone]            Apr 28, 2017 11:30 AM CDT   ESTABLISHED PRENATAL with Shahida Raphael MD   [facility] [place]    [address] [place] [address]   [phone]            May 12, 2017 11:30 AM CDT   ESTABLISHED PRENATAL with Shahida Raphael MD   [facility] [place]    [address] [place] [id number] [phone]              Who to contact     If you have questions or need follow up information about today's clinic visit or your schedule please contact [facility] directly at [phone].  Normal or non-critical lab and imaging results will be communicated to you by MyChart, letter or phone within 4 business days after the clinic has received the results. If you do not hear from us within 7 days, please contact the clinic through Packetzoomhart or phone. If you have a critical or abnormal lab result, we will notify you by phone as soon as possible.  Submit refill requests through Wallit or call your pharmacy and  they will forward the refill request to us. Please allow 3 business days for your refill to be completed.          Additional Information About Your Visit        Propel ITharDiditz Information     youcalc gives you secure access to your electronic health record. If you see a primary care provider, you can also send messages to your care team and make appointments. If you have questions, please call your primary care clinic.  If you do not have a primary care provider, please call [phone] and they will assist you.        Care EveryWhere ID     This is your Care EveryWhere ID. This could be used by other organizations to access your [place] medical records  [id number]        Your Vitals Were     Last Period                   08/12/2016            Blood Pressure from Last 3 Encounters:   02/24/17 92/62   01/26/17 93/58   12/30/16 (!) 89/61    Weight from Last 3 Encounters:   03/17/17 138 lb 9.6 oz (62.9 kg)   03/07/17 138 lb 9.6 oz (62.9 kg)   02/24/17 132 lb 14.4 oz (60.3 kg)              Today, you had the following     No orders found for display       Primary Care Provider Office Phone # Fax #    ELLEN Stanford [phone]       [facility] [address]  [facility] [address]        Thank you!     Thank you for choosing [facility]  for your care. Our goal is always to provide you with excellent care. Hearing back from our patients is one way we can continue to improve our services. Please take a few minutes to complete the written survey that you may receive in the mail after your visit with us. Thank you!             Your Updated Medication List - Protect others around you: Learn how to safely use, store and throw away your medicines at www.disposemymeds.org.          This list is accurate as of: 3/27/17  1:17 PM.  Always use your most recent med list.                   Brand Name Dispense Instructions for use    acetone (Urine) test Strp     20 each    Use to  check urine for ketones every morning.       blood glucose monitoring lancets     1 Box    Use to test blood sugars 4 times daily.       blood glucose monitoring test strip    ONE TOUCH VERIO IQ    150 strip    Use to test blood sugars 4 times daily (before breakfast and 1 hour after start of each meal).       prenatal multivitamin  plus iron 27-0.8 MG Tabs per tablet      Take 1 tablet by mouth daily

## 2017-03-28 ENCOUNTER — PRENATAL OFFICE VISIT (OUTPATIENT)
Dept: OBGYN | Facility: CLINIC | Age: 37
End: 2017-03-28
Payer: COMMERCIAL

## 2017-03-28 VITALS
HEART RATE: 110 BPM | TEMPERATURE: 98.9 F | WEIGHT: 140.8 LBS | DIASTOLIC BLOOD PRESSURE: 71 MMHG | HEIGHT: 63 IN | SYSTOLIC BLOOD PRESSURE: 99 MMHG | BODY MASS INDEX: 24.95 KG/M2

## 2017-03-28 DIAGNOSIS — O24.410 DIET CONTROLLED GESTATIONAL DIABETES MELLITUS (GDM) IN THIRD TRIMESTER: Primary | ICD-10-CM

## 2017-03-28 PROCEDURE — 99207 ZZC PRENATAL VISIT: CPT | Performed by: OBSTETRICS & GYNECOLOGY

## 2017-03-28 NOTE — PROGRESS NOTES
32w4d feeling ok, no c/o.  Good fm.  She failed her 3hr GTT, has been working with DE on diet control and activity.  Overall she is doing well with her BS.  About 75% are within range (excluding the first 4-5days prior to changing diet).  All FBS are <95, about 60% of 1hr pp bkft are <140, highs are 140-150's.  About 75% of remainder of 1hr pp are ,140.  She thinks bkft are higher because she does her lessons in the morning, so is less active than after lunch and dinner.  We discussed potential need for glyburide, but will give another week with addition of walk after bkft to see if they will come down.  Will schedule growth scan for 2 weeks.    We had a lengthy discussion about the potential risks of GDM, especially uncontrolled, including macrosomia, shoulder dystocia and subsequent sequelae, IUGR and IUFD.  We also discussed the increased risk of other pregnancy related conditions like preeclampsia.  I reiterated the importance of close BS monitoring along with diet and exercise.  RTC weekly  veronika

## 2017-03-28 NOTE — MR AVS SNAPSHOT
After Visit Summary   3/28/2017    Liam Ellis    MRN: [id number]           Patient Information     Date Of Birth          1980        Visit Information        Provider Department      3/28/2017 11:15 AM Shahida Raphael MD [facility]        Today's Diagnoses     Diet controlled gestational diabetes mellitus (GDM) in third trimester    -  1       Follow-ups after your visit        Your next 10 appointments already scheduled     Apr 14, 2017 11:15 AM CDT   ESTABLISHED PRENATAL with Shahida Raphael MD   [facility] [place]    [address] [place] [address]   [phone]            Apr 28, 2017 11:30 AM CDT   ESTABLISHED PRENATAL with Shahida Raphael MD   [facility]    [address] [place] [address]   [phone]            May 12, 2017 11:30 AM CDT   ESTABLISHED PRENATAL with Shahida Raphael MD   [facility]    [address] [place] [address]   [phone]              Future tests that were ordered for you today     Open Future Orders        Priority Expected Expires Ordered    US OB Ltd One Or More Fetus FU/Repeat Routine 6/26/2017 3/28/2018 3/28/2017            Who to contact     If you have questions or need follow up information about today's clinic visit or your schedule please contact [facility] directly at [phone].  Normal or non-critical lab and imaging results will be communicated to you by MyChart, letter or phone within 4 business days after the clinic has received the results. If you do not hear from us within 7 days, please contact the clinic through Fixyahart or phone. If you have a critical or abnormal lab result, we will notify you by phone as soon as possible.  Submit refill requests through [email or web address] or call your pharmacy and  "they will forward the refill request to us. Please allow 3 business days for your refill to be completed.          Additional Information About Your Visit        Apruvehart Information     MAR Systems gives you secure access to your electronic health record. If you see a primary care provider, you can also send messages to your care team and make appointments. If you have questions, please call your primary care clinic.  If you do not have a primary care provider, please call [phone] and they will assist you.        Care EveryWhere ID     This is your Care EveryWhere ID. This could be used by other organizations to access your [place] medical records  [id number]        Your Vitals Were     Pulse Temperature Height Last Period BMI (Body Mass Index)       110 98.9  F (37.2  C) (Oral) 5' 2.5\" (1.588 m) 08/12/2016 25.34 kg/m2        Blood Pressure from Last 3 Encounters:   03/28/17 99/71   02/24/17 92/62   01/26/17 93/58    Weight from Last 3 Encounters:   03/28/17 140 lb 12.8 oz (63.9 kg)   03/17/17 138 lb 9.6 oz (62.9 kg)   03/07/17 138 lb 9.6 oz (62.9 kg)               Primary Care Provider Office Phone # Fax #    ELLEN Stanford [phone]       [facility] [address]  [facility] [address]        Thank you!     Thank you for choosing [facility]  for your care. Our goal is always to provide you with excellent care. Hearing back from our patients is one way we can continue to improve our services. Please take a few minutes to complete the written survey that you may receive in the mail after your visit with us. Thank you!             Your Updated Medication List - Protect others around you: Learn how to safely use, store and throw away your medicines at www.disposemymeds.org.          This list is accurate as of: 3/28/17 11:52 AM.  Always use your most recent med list.                   Brand Name Dispense Instructions for use    acetone (Urine) test Strp     20 " each    Use to check urine for ketones every morning.       blood glucose monitoring lancets     1 Box    Use to test blood sugars 4 times daily.       blood glucose monitoring test strip    ONE TOUCH VERIO IQ    150 strip    Use to test blood sugars 4 times daily (before breakfast and 1 hour after start of each meal).       prenatal multivitamin  plus iron 27-0.8 MG Tabs per tablet      Take 1 tablet by mouth daily

## 2017-03-28 NOTE — TELEPHONE ENCOUNTER
Patient replied:    3/25 breakfast barley oat meal  3/26 breakfast sausage egg biscuit with half cup of coffee   3/27 breakfast barley oat meal    A/P: Patient appears to have had blood glucose in target with egg sausage biscuit and high after eating barely oatmeal.  Will suggest more protein.    CDE's reply:     Thank Liam!    It looks like the sausage egg biscuit breakfast may work better for you.  I don't know what your blood sugar was after breakfast yesterday but if you are seeing it high with the barley oatmeal, you could try adding more protein (1-2 eggs or 1-2 slices of cheese) or nuts (add 1-2 tablespoon nut butter or nuts for more fat) and see if that helps.    Some moms struggle with hot and cold cereals in the morning. If adding protein and/or fat not seem to work for you anymore, you might try to stick with breakfast sandwiches or toast with peanut or nut butter or avocado in the morning.  Some moms also really enjoy cheese on their toast in the morning.  You can mix it up with different kinds of bread (1/2 bagel or mini bagel, English muffin, Croissant) or a tortilla as well.    Please follow up with us again on Thursday so we can see how changes are working for you.    Thanks and keep up the good work Liam!     Merline Matos RD, LD, CDE

## 2017-04-04 ENCOUNTER — TELEPHONE (OUTPATIENT)
Dept: NURSING | Facility: CLINIC | Age: 37
End: 2017-04-04

## 2017-04-05 NOTE — TELEPHONE ENCOUNTER
Call Type: Triage Call    Presenting Problem: Patient called stating that she had a smear of  bradley blood on the tissue after she urinated.  No other symptoms,  she had just come from a walk.  Triage Note:  Guideline Title: Pregnancy: Vaginal Bleeding, More Than 20 Weeks  Recommended Disposition: Provide Home/Self Care  Original Inclination: Did not know what to do  Override Disposition:  Intended Action: Follow Selfcare / Homecare  Physician Contacted: No  Light painless vaginal spotting within 24 hours of gynecological exam or sexual  intercourse ?  YES  Regular contractions less than 5 minutes apart ? NO  Sudden gush or trickle of fluid from the vagina ? NO  Vaginal bleeding AND less than 20 weeks gestation ? NO  Any intermittent contractions ? NO  Continuous hardening (non-relaxing) of uterus or abdomen, regardless of presence  or severity of pain ? NO  New or worsening signs and symptoms that may indicate shock ? NO  Feeling of baby coming or wanting to push (urge to bear down) ? NO  Recent (within past 12 hours) vaginal or abdominal trauma ? NO  Intermittent light painless vaginal spotting or bleeding AND more than 24 hours  after sexual intercourse or GYN exam ? NO  Unbearable abdominal/pelvic pain ? NO  Umbilical cord or any part of the baby (head, bottom, arm or leg) at the opening  of the vagina ? NO  Gush or leakage of green or green-tinged or port-wine colored fluid (reddish and  watery) from the vagina ? NO  Decreased fetal movement (less than 10 kicks/movements within two hours or a  significant change in usual pattern compared to previous days) ? NO  Foul smelling or unusual vaginal discharge (such as change in color, odor, or  amount of vaginal discharge) ? NO  Cerclage (cervix sutured closed) in place AND any vaginal bleeding or fluid ? NO  Any abdominal cramping OR back pain ? NO  Continuous bright red vaginal bleeding for more than 15 minutes (more than  spotting) ? NO  Persistent dizziness OR  lightheadedness that does not resolve within ten minutes ?  NO  Vaginal bleeding more than bloody show ? NO  Profuse vaginal bleeding not contained by pads ? NO  Physician Instructions:  Care Advice: Call provider if spotting persists greater than 24 hours or  increases  experiencing abdominal or back pain, contractions  noticeable decrease in fetal movement  leaking of vaginal fluid or rupture of membranes  foul smelling or unusual vaginal discharge.  Call provider immediately if this is a high risk or complicated pregnancy.  HEALTH PROMOTION / MAINTENANCE  Post GYN exam:   - Spotting within the first 24 hours after a pelvic exam,  ultrasound, Pap smear, or cervical biopsy is common as the cervix is very  vascular and bleeds easily.   - Spotting should turn brownish and disappear  within 48 hours.   - Persistent or increased bleeding or bleeding  associated with pain requires immediate evaluation.  Call  if any of these occur: profuse bright red vaginal bleeding  continuous (without relaxation) abdominal pain  the umbilical cord or any fetal part in vagina  bag of chavarria coming through vagina  feeling of wanting to push or have a bowel movement.

## 2017-04-14 ENCOUNTER — RADIANT APPOINTMENT (OUTPATIENT)
Dept: ULTRASOUND IMAGING | Facility: CLINIC | Age: 37
End: 2017-04-14
Attending: OBSTETRICS & GYNECOLOGY
Payer: COMMERCIAL

## 2017-04-14 ENCOUNTER — PRENATAL OFFICE VISIT (OUTPATIENT)
Dept: OBGYN | Facility: CLINIC | Age: 37
End: 2017-04-14
Payer: COMMERCIAL

## 2017-04-14 VITALS
TEMPERATURE: 97.7 F | BODY MASS INDEX: 25.94 KG/M2 | WEIGHT: 144.1 LBS | HEART RATE: 77 BPM | DIASTOLIC BLOOD PRESSURE: 63 MMHG | SYSTOLIC BLOOD PRESSURE: 90 MMHG

## 2017-04-14 DIAGNOSIS — O24.410 DIET CONTROLLED GESTATIONAL DIABETES MELLITUS (GDM) IN THIRD TRIMESTER: ICD-10-CM

## 2017-04-14 DIAGNOSIS — O24.415 GESTATIONAL DIABETES MELLITUS (GDM) CONTROLLED ON ORAL HYPOGLYCEMIC DRUG, ANTEPARTUM: Primary | ICD-10-CM

## 2017-04-14 PROCEDURE — 76816 OB US FOLLOW-UP PER FETUS: CPT | Performed by: OBSTETRICS & GYNECOLOGY

## 2017-04-14 PROCEDURE — 99207 ZZC PRENATAL VISIT: CPT | Performed by: OBSTETRICS & GYNECOLOGY

## 2017-04-14 RX ORDER — GLYBURIDE 2.5 MG/1
2.5 TABLET ORAL
Qty: 90 TABLET | Refills: 3 | Status: ON HOLD | OUTPATIENT
Start: 2017-04-14 | End: 2017-05-14

## 2017-04-14 NOTE — MR AVS SNAPSHOT
After Visit Summary   4/14/2017    Liam Ellis    MRN: [id number]           Patient Information     Date Of Birth          1980        Visit Information        Provider Department      4/14/2017 11:15 AM Shahida Raphael MD [facility]        Today's Diagnoses     Gestational diabetes mellitus (GDM) controlled on oral hypoglycemic drug, antepartum    -  1       Follow-ups after your visit        Additional Services     MAT FETAL MED CTR REFERRAL-PREGNANCY       >> Patient may proceed with recommendations for further testing as directed by the Maternal Fetal Medicine Specialist >>    >> If requesting Fetal Echo: MFM will determine appropriate location for exam due to indication.    >> If requesting Lung Maturity Amnio:  If results indicate fetal lung maturity, induction or C/S is recommended within 36 hours.  Please schedule accordingly.     Dear Patient:   Please be aware that coverage of these services is subject to the terms and limitations of your health insurance plan.  Call member services at your health plan with any benefit or coverage questions.      Please bring the following to your appointment:    >>  Any x-rays, CTs or MRIs which have been performed.  Contact the facility where they were done to arrange for  prior to your scheduled appointment.  Any new CT, MRI or other procedures ordered by your specialist must be performed at a [facility] facility or coordinated by your clinic's referral office.  >>  List of current medications   >>  This referral request   >>  Any documents/labs given to you for this referral                  Your next 10 appointments already scheduled     Apr 28, 2017 11:30 AM CDT   ESTABLISHED PRENATAL with Shahida Raphael MD   [facility]    [address] [place] [address]   [phone]            May 12, 2017  1:00 PM CDT   ESTABLISHED PRENATAL with Shahida MCNAIR  MD [place]   [facility] ([facility]    [address] [place] [address] [phone]              Who to contact     If you have questions or need follow up information about today's clinic visit or your schedule please contact [facility] directly at [phone].  Normal or non-critical lab and imaging results will be communicated to you by MyChart, letter or phone within 4 business days after the clinic has received the results. If you do not hear from us within 7 days, please contact the clinic through UGOBEhart or phone. If you have a critical or abnormal lab result, we will notify you by phone as soon as possible.  Submit refill requests through Quantenna Communications or call your pharmacy and they will forward the refill request to us. Please allow 3 business days for your refill to be completed.          Additional Information About Your Visit        UGOBEharEZDOCTOR Information     [employer] gives you secure access to your electronic health record. If you see a primary care provider, you can also send messages to your care team and make appointments. If you have questions, please call your primary care clinic.  If you do not have a primary care provider, please call [phone] and they will assist you.        Care EveryWhere ID     This is your Care EveryWhere ID. This could be used by other organizations to access your [place] medical records  [id number]        Your Vitals Were     Pulse Temperature Last Period BMI (Body Mass Index)          77 97.7  F (36.5  C) (Oral) 08/12/2016 25.94 kg/m2         Blood Pressure from Last 3 Encounters:   04/14/17 90/63   03/28/17 99/71   02/24/17 92/62    Weight from Last 3 Encounters:   04/14/17 144 lb 1.6 oz (65.4 kg)   03/28/17 140 lb 12.8 oz (63.9 kg)   03/17/17 138 lb 9.6 oz (62.9 kg)              We Performed the Following     MAT FETAL MED CTR REFERRAL-PREGNANCY          Today's Medication Changes          These  changes are accurate as of: 4/14/17 12:23 PM.  If you have any questions, ask your nurse or doctor.               Start taking these medicines.        Dose/Directions    glyBURIDE 2.5 MG tablet   Commonly known as:  DIABETA /MICRONASE   Used for:  Gestational diabetes mellitus (GDM) controlled on oral hypoglycemic drug, antepartum   Started by:  Shahida Raphael MD        Dose:  2.5 mg   Take 1 tablet (2.5 mg) by mouth daily (with breakfast)   Quantity:  90 tablet   Refills:  3            Where to get your medicines      These medications were sent to [id number] [place] - [facility] [address] AT [place] [address], [place] [address]     Phone:  [phone]     glyBURIDE 2.5 MG tablet                Primary Care Provider Office Phone # Fax #    Ana Rosa ELLEN Clarke [phone]       [place] [address]  [facility] [address]        Thank you!     Thank you for choosing [facility]  for your care. Our goal is always to provide you with excellent care. Hearing back from our patients is one way we can continue to improve our services. Please take a few minutes to complete the written survey that you may receive in the mail after your visit with us. Thank you!             Your Updated Medication List - Protect others around you: Learn how to safely use, store and throw away your medicines at www.disposemymeds.org.          This list is accurate as of: 4/14/17 12:23 PM.  Always use your most recent med list.                   Brand Name Dispense Instructions for use    acetone (Urine) test Strp     20 each    Use to check urine for ketones every morning.       blood glucose monitoring lancets     1 Box    Use to test blood sugars 4 times daily.       blood glucose monitoring test strip    ONE TOUCH VERIO IQ    150 strip    Use to test blood sugars 4 times daily (before breakfast and 1 hour after start of  each meal).       glyBURIDE 2.5 MG tablet    DIABETA /MICRONASE    90 tablet    Take 1 tablet (2.5 mg) by mouth daily (with breakfast)       prenatal multivitamin  plus iron 27-0.8 MG Tabs per tablet      Take 1 tablet by mouth daily

## 2017-04-14 NOTE — PROGRESS NOTES
35w0d feeling ok, no c/o.  Good fm.  BS are ok.  All FBS are well <95, >50% after bkft are elevated and about 30% post lunch.  Ranging from 140-212.  Dinner about 95% are within range.  Will begin glyburide 2.5mg am.  She had growth today, 29%tile, AC <2wks behind.  FL<1%.  RUIZ 9.6.  Will send to [facility] for repeat growth and begin BPPs since starting the glyburide.  Discussed need for IOL at 39wks, early if bs not well controlled.  She verbalizes understanding and agreement. GBS next visit.  RTC weekly  veronika

## 2017-04-19 ENCOUNTER — PRENATAL OFFICE VISIT (OUTPATIENT)
Dept: OBGYN | Facility: CLINIC | Age: 37
End: 2017-04-19
Payer: COMMERCIAL

## 2017-04-19 ENCOUNTER — OFFICE VISIT (OUTPATIENT)
Dept: MATERNAL FETAL MEDICINE | Facility: CLINIC | Age: 37
End: 2017-04-19
Attending: OBSTETRICS & GYNECOLOGY
Payer: COMMERCIAL

## 2017-04-19 ENCOUNTER — HOSPITAL ENCOUNTER (OUTPATIENT)
Dept: ULTRASOUND IMAGING | Facility: CLINIC | Age: 37
Discharge: HOME | End: 2017-04-19
Attending: OBSTETRICS & GYNECOLOGY | Admitting: OBSTETRICS & GYNECOLOGY
Payer: COMMERCIAL

## 2017-04-19 VITALS
DIASTOLIC BLOOD PRESSURE: 65 MMHG | BODY MASS INDEX: 25.61 KG/M2 | HEART RATE: 77 BPM | WEIGHT: 142.3 LBS | SYSTOLIC BLOOD PRESSURE: 95 MMHG | TEMPERATURE: 98 F | OXYGEN SATURATION: 98 %

## 2017-04-19 DIAGNOSIS — O26.90 PREGNANCY RELATED CONDITION, UNSPECIFIED TRIMESTER: ICD-10-CM

## 2017-04-19 DIAGNOSIS — O24.415 GESTATIONAL DIABETES MELLITUS TREATED WITH ORAL HYPOGLYCEMIC THERAPY: Primary | ICD-10-CM

## 2017-04-19 DIAGNOSIS — O24.415 GESTATIONAL DIABETES MELLITUS (GDM) IN THIRD TRIMESTER CONTROLLED ON ORAL HYPOGLYCEMIC DRUG: Primary | ICD-10-CM

## 2017-04-19 PROCEDURE — 99207 ZZC PRENATAL VISIT: CPT | Performed by: OBSTETRICS & GYNECOLOGY

## 2017-04-19 PROCEDURE — 76816 OB US FOLLOW-UP PER FETUS: CPT

## 2017-04-19 PROCEDURE — 76819 FETAL BIOPHYS PROFIL W/O NST: CPT | Performed by: OBSTETRICS & GYNECOLOGY

## 2017-04-19 NOTE — MR AVS SNAPSHOT
After Visit Summary   4/19/2017    Liam Ellis    MRN: [id number]           Patient Information     Date Of Birth          1980        Visit Information        Provider Department      4/19/2017 8:30 AM Alycia Shelby,  [facility] [place]        Today's Diagnoses     Gestational diabetes mellitus treated with oral hypoglycemic therapy    -  1       Follow-ups after your visit        Your next 10 appointments already scheduled     Apr 21, 2017  8:30 AM CDT   MFM BPP SINGLE with [id number]   [facility] - [facility])    [address]  [facility] [address]   [phone]            Apr 21, 2017  9:00 AM CDT   Radiology MD with EVELIN WILDE MD   [facility])    [address]  [facility] [address]   [phone]           Please arrive at the time given for your first appointment.  This visit is used internally to schedule the physician's time during your ultrasound.            Apr 24, 2017  3:30 PM CDT   MFM BPP SINGLE with [id number]   [facility] [place] ([facility])    [address]  [facility] [address]   [phone]            Apr 24, 2017  4:00 PM CDT   Radiology MD with EVELIN WILDE MD   [facility] [place] ([facility])    [address]  [facility] [address]   [phone]           Please arrive at the time given for your first appointment.  This visit is used internally to schedule the physician's time during your ultrasound.            Apr 27, 2017 11:45 AM CDT   MFM BPP SINGLE with [id number]   [facility] [place] ([facility])    [address]  [facility]  [address]   [phone]            Apr 27, 2017 12:15 PM CDT   Radiology MD with EVELIN WILDE MD   [facility] [place] ([facility])    [address]  [place] [address]   [phone]           Please arrive at the time given for your first appointment.  This visit is used internally to schedule the physician's time during your ultrasound.            Apr 28, 2017 11:30 AM CDT   ESTABLISHED PRENATAL with Shahida Raphael MD   [facility] ([facility])    [address] [facility] [address]  [facility] [address]   [phone]            May 01, 2017  8:00 AM CDT   MFM BPP SINGLE with [id number]   [facility])    [address]  [facility] [address]   [phone]            May 01, 2017  8:30 AM CDT   Radiology MD with EVELIN WILDE MD   [facility])    [address]  [facility] [place] [address]   [phone]           Please arrive at the time given for your first appointment.  This visit is used internally to schedule the physician's time during your ultrasound.            May 04, 2017  9:30 AM CDT   MFM BPP SINGLE with URMFMUSR2   [facility] [place] ([facility])    [address]  [facility] [address]   [phone]              Future tests that were ordered for you today     Open Future Orders        Priority Expected Expires Ordered    Maternal Fetal BPP Single Routine 4/21/2017 4/19/2018 4/19/2017    Maternal Fetal BPP Single Routine  4/19/2018 4/19/2017    Maternal Fetal BPP Single Routine  4/19/2018 4/19/2017    Maternal Fetal BPP Single Routine  4/19/2018 4/19/2017    Maternal Fetal BPP Single Routine  4/19/2018 4/19/2017    Maternal Fetal US Comprehensive  Laury OLIVEIRA Routine 5/10/2017 4/19/2018 4/19/2017            Who to contact     If you have questions or need follow up information about today's clinic visit or your schedule please contact [facility] MATERNAL FETAL MEDICINE [facility] directly at [phone].  Normal or non-critical lab and imaging results will be communicated to you by MyChart, letter or phone within 4 business days after the clinic has received the results. If you do not hear from us within 7 days, please contact the clinic through [employer] or phone. If you have a critical or abnormal lab result, we will notify you by phone as soon as possible.  Submit refill requests through [employer] or call your pharmacy and they will forward the refill request to us. Please allow 3 business days for your refill to be completed.          Additional Information About Your Visit        [employer] Information     [employer] gives you secure access to your electronic health record. If you see a primary care provider, you can also send messages to your care team and make appointments. If you have questions, please call your primary care clinic.  If you do not have a primary care provider, please call [phone] and they will assist you.        Care EveryWhere ID     This is your Care EveryWhere ID. This could be used by other organizations to access your [facility] medical records  [id number]        Your Vitals Were     Last Period                   08/12/2016            Blood Pressure from Last 3 Encounters:   04/14/17 90/63   03/28/17 99/71   02/24/17 92/62    Weight from Last 3 Encounters:   04/14/17 65.4 kg (144 lb 1.6 oz)   03/28/17 63.9 kg (140 lb 12.8 oz)   03/17/17 62.9 kg (138 lb 9.6 oz)               Primary Care Provider Office Phone # Fax #    ELLEN Stanford [phone]       [facility] [address]  [facility] [address]        Thank you!     Thank you for choosing [facility]ERNAL FETAL [facility]  for your care.  Our goal is always to provide you with excellent care. Hearing back from our patients is one way we can continue to improve our services. Please take a few minutes to complete the written survey that you may receive in the mail after your visit with us. Thank you!             Your Updated Medication List - Protect others around you: Learn how to safely use, store and throw away your medicines at www.disposemymeds.org.          This list is accurate as of: 4/19/17  9:02 AM.  Always use your most recent med list.                   Brand Name Dispense Instructions for use    acetone (Urine) test Strp     20 each    Use to check urine for ketones every morning.       blood glucose monitoring lancets     1 Box    Use to test blood sugars 4 times daily.       blood glucose monitoring test strip    ONE TOUCH VERIO IQ    150 strip    Use to test blood sugars 4 times daily (before breakfast and 1 hour after start of each meal).       glyBURIDE 2.5 MG tablet    DIABETA /MICRONASE    90 tablet    Take 1 tablet (2.5 mg) by mouth daily (with breakfast)       prenatal multivitamin  plus iron 27-0.8 MG Tabs per tablet      Take 1 tablet by mouth daily

## 2017-04-19 NOTE — PROGRESS NOTES
35w5d feeling ok, no c/o.  Good fm.  Doing ok with the glyburide.  Overall her BS are good.  All FBS are <95.  Almost all 1hr PP are <140, 2 elevated post breakfast in 150's.  Discussed diet changes.  Had growth and BPP with MFM today, 5lb 10oz, 33%tile.  RUIZ <5%tile for gestation, going to repeat on Friday.  Discussed plan for delivery at 39wks, earlier if needed.  RTC weekly  veronika

## 2017-04-19 NOTE — MR AVS SNAPSHOT
After Visit Summary   4/19/2017    Liam Ellis    MRN: [id number]           Patient Information     Date Of Birth          1980        Visit Information        Provider Department      4/19/2017 9:00 AM Shahida Raphael MD [facility]        Today's Diagnoses     Gestational diabetes mellitus (GDM) in third trimester controlled on oral hypoglycemic drug    -  1       Follow-ups after your visit        Your next 10 appointments already scheduled     Apr 21, 2017  8:30 AM CDT   MFM BPP SINGLE with [id number]   [facility])    [address]  [facility] [address]   [phone]            Apr 21, 2017  9:00 AM CDT   Radiology MD with EVELIN WILDE MD   [facility])    [address]  [facility] [address]   [phone]           Please arrive at the time given for your first appointment.  This visit is used internally to schedule the physician's time during your ultrasound.            Apr 24, 2017  3:30 PM CDT   MFM BPP SINGLE with [id number]   [facility] [place] ([facility])    [address]  [facility] [address]   [phone]            Apr 24, 2017  4:00 PM CDT   Radiology MD with EVELIN WILDE MD   [facility])    [address]  [facility] [address]   [phone]           Please arrive at the time given for your first appointment.  This visit is used internally to schedule the physician's time during your ultrasound.            Apr 27, 2017 11:45 AM CDT   MFM BPP SINGLE with [id number]   [facility] [place] ([facility])    [address]  [facility]  [address]   [phone]            Apr 27, 2017 12:15 PM CDT   Radiology MD with EVELIN WILDE MD   [facility] [place] ([facility])    [address]  [place] [address]   [phone]           Please arrive at the time given for your first appointment.  This visit is used internally to schedule the physician's time during your ultrasound.            Apr 28, 2017 11:30 AM CDT   ESTABLISHED PRENATAL with Shahida Raphael MD   [facility]    [address] [facility] [address]  [facility] [address]   [phone]            May 01, 2017  8:00 AM CDT   MFM BPP SINGLE with [id number]   [facility])    [address]  [facility] [address]   [phone]            May 01, 2017  8:30 AM CDT   Radiology MD with EVELIN WILDE MD   [facility])    [address]  [facility] [place] [address]   [phone]           Please arrive at the time given for your first appointment.  This visit is used internally to schedule the physician's time during your ultrasound.            May 04, 2017  9:30 AM CDT   MFM BPP SINGLE with [email or web address]   [facility] [place] ([facility])    [address]  [facility] [address]   [phone]              Future tests that were ordered for you today     Open Future Orders        Priority Expected Expires Ordered    Maternal Fetal BPP Single Routine 4/21/2017 4/19/2018 4/19/2017    Maternal Fetal BPP Single Routine  4/19/2018 4/19/2017    Maternal Fetal BPP Single Routine  4/19/2018 4/19/2017    Maternal Fetal BPP Single Routine  4/19/2018 4/19/2017    Maternal Fetal BPP Single Routine  4/19/2018 4/19/2017    Maternal Fetal US Comprehensive  Laury OLIVEIRA Routine 5/10/2017 4/19/2018 4/19/2017            Who to contact     If you have questions or need follow up information about today's clinic visit or your schedule please contact [facility] directly at [phone].  Normal or non-critical lab and imaging results will be communicated to you by MyChart, letter or phone within 4 business days after the clinic has received the results. If you do not hear from us within 7 days, please contact the clinic through MyChart or phone. If you have a critical or abnormal lab result, we will notify you by phone as soon as possible.  Submit refill requests through [employer] or call your pharmacy and they will forward the refill request to us. Please allow 3 business days for your refill to be completed.          Additional Information About Your Visit        MyChart Information     [employer] gives you secure access to your electronic health record. If you see a primary care provider, you can also send messages to your care team and make appointments. If you have questions, please call your primary care clinic.  If you do not have a primary care provider, please call [phone] and they will assist you.        Care EveryWhere ID     This is your Care EveryWhere ID. This could be used by other organizations to access your [place] medical records  [id number]        Your Vitals Were     Pulse Temperature Last Period Pulse Oximetry BMI (Body Mass Index)       77 98  F (36.7  C) (Oral) 08/12/2016 98% 25.61 kg/m2        Blood Pressure from Last 3 Encounters:   04/19/17 95/65   04/14/17 90/63   03/28/17 99/71    Weight from Last 3 Encounters:   04/19/17 142 lb 4.8 oz (64.5 kg)   04/14/17 144 lb 1.6 oz (65.4 kg)   03/28/17 140 lb 12.8 oz (63.9 kg)              Today, you had the following     No orders found for display       Primary Care Provider Office Phone # Fax #    ELLEN Stanford [phone]       [facility] [address]  [place] [facility] [address]        Thank you!     Thank you for choosing [facility]  for your care. Our goal is always to provide you with excellent care. Hearing back from our patients is one way we can continue to improve our services. Please take a few minutes to complete the written survey that you may receive in the mail after your visit with us. Thank you!             Your Updated Medication List - Protect others around you: Learn how to safely use, store and throw away your medicines at www.disposemymeds.org.          This list is accurate as of: 4/19/17 12:47 PM.  Always use your most recent med list.                   Brand Name Dispense Instructions for use    acetone (Urine) test Strp     20 each    Use to check urine for ketones every morning.       blood glucose monitoring lancets     1 Box    Use to test blood sugars 4 times daily.       blood glucose monitoring test strip    ONE TOUCH VERIO IQ    150 strip    Use to test blood sugars 4 times daily (before breakfast and 1 hour after start of each meal).       glyBURIDE 2.5 MG tablet    DIABETA /MICRONASE    90 tablet    Take 1 tablet (2.5 mg) by mouth daily (with breakfast)       prenatal multivitamin  plus iron 27-0.8 MG Tabs per tablet      Take 1 tablet by mouth daily

## 2017-04-19 NOTE — PROGRESS NOTES
"Please see \"Imaging\" tab under \"Chart Review\" for details of today's US.      Alycia Shelby, DO  Maternal-Fetal Medicine        "

## 2017-04-20 ASSESSMENT — PATIENT HEALTH QUESTIONNAIRE - PHQ9: SUM OF ALL RESPONSES TO PHQ QUESTIONS 1-9: 2

## 2017-04-21 ENCOUNTER — HOSPITAL ENCOUNTER (OUTPATIENT)
Dept: ULTRASOUND IMAGING | Facility: CLINIC | Age: 37
Discharge: HOME | End: 2017-04-21
Attending: OBSTETRICS & GYNECOLOGY | Admitting: OBSTETRICS & GYNECOLOGY
Payer: COMMERCIAL

## 2017-04-21 ENCOUNTER — OFFICE VISIT (OUTPATIENT)
Dept: MATERNAL FETAL MEDICINE | Facility: CLINIC | Age: 37
End: 2017-04-21
Attending: OBSTETRICS & GYNECOLOGY
Payer: COMMERCIAL

## 2017-04-21 DIAGNOSIS — O24.415 GESTATIONAL DIABETES MELLITUS TREATED WITH ORAL HYPOGLYCEMIC THERAPY: ICD-10-CM

## 2017-04-21 DIAGNOSIS — O09.512 AMA (ADVANCED MATERNAL AGE) PRIMIGRAVIDA 35+, SECOND TRIMESTER: Primary | ICD-10-CM

## 2017-04-21 PROCEDURE — 76819 FETAL BIOPHYS PROFIL W/O NST: CPT

## 2017-04-21 NOTE — MR AVS SNAPSHOT
After Visit Summary   4/21/2017    Liam Ellis    MRN: [id number]           Patient Information     Date Of Birth          1980        Visit Information        Provider Department      4/21/2017 9:00 AM Alycia Shelby DO [facility]        Today's Diagnoses     AMA (advanced maternal age) primigravida 35+, second trimester    -  1    Gestational diabetes mellitus treated with oral hypoglycemic therapy           Follow-ups after your visit        Your next 10 appointments already scheduled     Apr 21, 2017  9:00 AM CDT   Radiology MD with Alycia Shelby DO   [facility])    [address]  [facility] [address]   [phone]           Please arrive at the time given for your first appointment.  This visit is used internally to schedule the physician's time during your ultrasound.            Apr 24, 2017  3:30 PM CDT   CHANI REYNA SINGLE with URMFMUSR3   [facility])    [address]  [facility] [address]   [phone]            Apr 24, 2017  4:00 PM CDT   Radiology MD with EVELIN WILDE MD   [facility])    [address]  [facility] [address]   [phone]           Please arrive at the time given for your first appointment.  This visit is used internally to schedule the physician's time during your ultrasound.            Apr 27, 2017 11:45 AM CDT   CHANI REYNA SINGLE with URMFMUSR4   [facility])    [address]  [facility] [address]   [phone]            Apr 27, 2017 12:15 PM CDT   Radiology MD with EVELIN WILDE MD   [facility]  [facility])    [address]  [facility] [address]   [phone]           Please arrive at the time given for your first appointment.  This visit is used internally to schedule the physician's time during your ultrasound.            Apr 28, 2017 11:30 AM CDT   ESTABLISHED PRENATAL with Shahida Raphael MD   [facility] ([facility])    [address]  [address]  [facility] [address]   [phone]            May 01, 2017  8:00 AM CDT   RM REYNA SINGLE with URMFMUSR1   [facility] [place] ([facility])    [address]  [facility] [address]   [phone]            May 01, 2017  8:30 AM CDT   Radiology MD with EVELIN WILDE MD   [facility] Maternal Fetal Medicine [facility] ([facility])    [address]  [facility] [address]   [phone]           Please arrive at the time given for your first appointment.  This visit is used internally to schedule the physician's time during your ultrasound.            May 04, 2017  9:30 AM CDT   CHANI REYNA SINGLE with URMFMUSR2   [facility] [place] ([facility])    [address]  [facility] [address]   [phone]            May 04, 2017 10:00 AM CDT   Radiology MD with EVELIN WILDE MD   [facility] [place] ([facility])    [address]  [facility] [address]   [phone]           Please arrive at the time given for your first appointment.  This visit is used internally to schedule the physician's time during your ultrasound.              Who to contact     If you have questions or need follow up information about today's clinic visit or your schedule please contact [facility]ERNAL FETAL MEDIC[facility] directly at [phone].  Normal or non-critical lab and  imaging results will be communicated to you by MyChart, letter or phone within 4 business days after the clinic has received the results. If you do not hear from us within 7 days, please contact the clinic through [employer] or phone. If you have a critical or abnormal lab result, we will notify you by phone as soon as possible.  Submit refill requests through [employer] or call your pharmacy and they will forward the refill request to us. Please allow 3 business days for your refill to be completed.          Additional Information About Your Visit        [email or web address] Information     [employer] gives you secure access to your electronic health record. If you see a primary care provider, you can also send messages to your care team and make appointments. If you have questions, please call your primary care clinic.  If you do not have a primary care provider, please call [phone] and they will assist you.        Care EveryWhere ID     This is your Care EveryWhere ID. This could be used by other organizations to access your [place] medical records  [id number]        Your Vitals Were     Last Period                   08/12/2016            Blood Pressure from Last 3 Encounters:   04/19/17 95/65   04/14/17 90/63   03/28/17 99/71    Weight from Last 3 Encounters:   04/19/17 64.5 kg (142 lb 4.8 oz)   04/14/17 65.4 kg (144 lb 1.6 oz)   03/28/17 63.9 kg (140 lb 12.8 oz)              Today, you had the following     No orders found for display       Primary Care Provider Office Phone # Fax #    ELLEN Stanford [phone]       [facility] [address] [facility] [address]        Thank you!     Thank you for choosing [facility]  for your care. Our goal is always to provide you with excellent care. Hearing back from our patients is one way we can continue to improve our services. Please take a few minutes to complete the written survey that you may receive in the  mail after your visit with us. Thank you!             Your Updated Medication List - Protect others around you: Learn how to safely use, store and throw away your medicines at www.disposemymeds.org.          This list is accurate as of: 4/21/17  8:54 AM.  Always use your most recent med list.                   Brand Name Dispense Instructions for use    acetone (Urine) test Strp     20 each    Use to check urine for ketones every morning.       blood glucose monitoring lancets     1 Box    Use to test blood sugars 4 times daily.       blood glucose monitoring test strip    ONE TOUCH VERIO IQ    150 strip    Use to test blood sugars 4 times daily (before breakfast and 1 hour after start of each meal).       glyBURIDE 2.5 MG tablet    DIABETA /MICRONASE    90 tablet    Take 1 tablet (2.5 mg) by mouth daily (with breakfast)       prenatal multivitamin  plus iron 27-0.8 MG Tabs per tablet      Take 1 tablet by mouth daily

## 2017-04-23 ENCOUNTER — TELEPHONE (OUTPATIENT)
Dept: NURSING | Facility: CLINIC | Age: 37
End: 2017-04-23

## 2017-04-23 NOTE — TELEPHONE ENCOUNTER
"Call Type: Triage Call    Presenting Problem: 36 weeks pregnant. She has a headache and cold  symtoms. Sinus congestion and coughing for the last two days. Last  took Tylenol 500mg about 3 hours ago.  Triage Note:  Guideline Title: Headache  Recommended Disposition: See Provider within 24 hours  Original Inclination: Wanted to speak with a nurse  Override Disposition:  Intended Action: See /Make Appt  Physician Contacted: No  Typical headache AND usual therapy is not available or is not working ?  YES  Follows head trauma ? NO  Unconscious now ? NO  Smoke/carbon monoxide exposure ? NO  Headache not relieved with home care measures and occurring within 48 hours of  head trauma ? NO  New seizure now or within last 6 hours ? NO  Pregnancy 20 weeks or more with sudden onset or worsening facial or hand swelling  ? NO  Temperature of 101.5 F (38.6 C) or greater that has not responded to 24 hours of  home care measures ? NO  New onset of severe dizziness/vertigo ? NO  Sudden loss or change in vision (double or blurred vision, increased light  sensitivity, partial loss of visual field) AND not previously evaluated ? NO  Sudden, severe disabling head pain OR caller spontaneously verbalizes \"worst  headache of my life\" ? NO  Sudden change in mental status or new change in speech ? NO  New onset of moderate to severe headache and taking anticoagulants (e.g. Coumadin,  warfarin, Lovenox, Plavix, Pradaxa, or Xarelto). ? NO  New tenderness over temporal area in individuals age 40 years or older ? NO  Being treated by a provider for a secondary infection AND no improvement in  symptoms, symptoms have worsened OR has new symptoms after following treatment  plan for the time specified by provider. ? NO  New onset of severe or worsening generalized headache AND fever, neck pain with  forward head movement (no injury) or altered mental status ? NO  High to low (but not zero) risk of exposure to Ebola within the past 21 days ? " NO  Traveled out of country in past 2 months and new onset of unexplained symptom(s) ?  NO  Any temperature elevation in an immunocompromised individual OR frail elderly with  signs of dehydration ? NO  New numbness, weakness or paralysis involving face, arm or leg, especially on same  side of body, loss of balance or coordination, confusion or trouble speaking  occurring now or within last 8 hours ? NO  Sudden onset of one-sided headache with severe eye pain or eye tearing on same  side ? NO  New onset of severe or unusual headache unrelieved with 4 hours of home care ? NO  Onset of vomiting associated with severe, worsening headache, especially if  different from previous headaches                       See Provider within 4  hours ? NO  Physician Instructions:  Care Advice: Another adult should drive.  Migraine Self Care:   - At the first sign of a migraine apply a cold cloth  or cloth-covered ice pack to your head or the back of the neck.   - Apply  pressure or massage scalp and temples.   - Lie down in a quiet, dark room  for several hours.   - Minimize noise, light, and odors, especially cooking  and tobacco odors.   - Do not read or use a computer.  Analgesic/Antipyretic Advice - Acetaminophen: Consider acetaminophen as  directed on label or by pharmacist/provider for pain or fever. PRECAUTIONS:  - Use if there is no history of liver disease, alcoholism, or intake of  three or more alcohol drinks per day - Only if approved by provider during  pregnancy or when breastfeeding - Do not exceed recommended dose or  frequency. Do not take more than 3000 milligrams (mg) in 24 hours. Do not  take this medicine for more than 10 days unless recommended by your  provider. - During pregnancy, acetaminophen should not be taken more than 3  consecutive days without telling provider - To make sure you don't take too  much, check other medicines you take to see if they also contain  acetaminophen.

## 2017-04-24 ENCOUNTER — OFFICE VISIT (OUTPATIENT)
Dept: MATERNAL FETAL MEDICINE | Facility: CLINIC | Age: 37
End: 2017-04-24
Attending: OBSTETRICS & GYNECOLOGY
Payer: COMMERCIAL

## 2017-04-24 ENCOUNTER — HOSPITAL ENCOUNTER (OUTPATIENT)
Dept: ULTRASOUND IMAGING | Facility: CLINIC | Age: 37
Discharge: HOME | End: 2017-04-24
Attending: OBSTETRICS & GYNECOLOGY | Admitting: OBSTETRICS & GYNECOLOGY
Payer: COMMERCIAL

## 2017-04-24 DIAGNOSIS — O24.415 GESTATIONAL DIABETES MELLITUS TREATED WITH ORAL HYPOGLYCEMIC THERAPY: ICD-10-CM

## 2017-04-24 DIAGNOSIS — O24.415 GESTATIONAL DIABETES MELLITUS TREATED WITH ORAL HYPOGLYCEMIC THERAPY: Primary | ICD-10-CM

## 2017-04-24 PROCEDURE — 76819 FETAL BIOPHYS PROFIL W/O NST: CPT

## 2017-04-24 NOTE — PROGRESS NOTES
"Please see \"Imaging\" tab under \"Chart Review\" for details of today's US.    Agustina Marie, DO    "

## 2017-04-24 NOTE — MR AVS SNAPSHOT
After Visit Summary   4/24/2017    Liam Ellis    MRN: [id number]           Patient Information     Date Of Birth          1980        Visit Information        Provider Department      4/24/2017 4:00 PM Agustina Marie DO [facility] Maternal Fetal Medicine - [place]        Today's Diagnoses     Gestational diabetes mellitus treated with oral hypoglycemic therapy    -  1       Follow-ups after your visit        Your next 10 appointments already scheduled     Apr 27, 2017 11:45 AM CDT   [facility] BPP SINGLE with [id number]   [facility] - [facility])    [address]  [facility] [address]   [phone]            Apr 27, 2017 12:15 PM CDT   Radiology MD with EVELIN WILDE MD   [facility] Maternal Fetal Medicine - [facility])    [address]  [place] [address]   [phone]           Please arrive at the time given for your first appointment.  This visit is used internally to schedule the physician's time during your ultrasound.            Apr 28, 2017 11:30 AM CDT   ESTABLISHED PRENATAL with Shahida Raphael MD   [facility] ([facility]    [address] [place] [address]   [phone]            May 01, 2017  8:00 AM CDT   [facility] BPP SINGLE with URMFMUSR1   [facility] Fetal Medicine Ultrasound - [place] ([facility])    [address]  [facility] [address]   [phone]            May 01, 2017  8:30 AM CDT   Radiology MD with EVELIN WILDE MD   [facility] - [place] ([facility])    [address]  [place] [address]   [phone]           Please arrive at the time given for your first appointment.  This visit is used internally to schedule the physician's time during your ultrasound.             May 04, 2017  9:30 AM CDT   RM BPP SINGLE with URMFMUSR2   [facility] [place] ([facility])    [address]  [facility] [id number] [phone]            May 04, 2017 10:00 AM CDT   Radiology MD with EVELIN WILDE MD   [facility] [place] ([facility])    [address]  [facility] [address]   [phone]           Please arrive at the time given for your first appointment.  This visit is used internally to schedule the physician's time during your ultrasound.            May 11, 2017 10:15 AM CDT   [facility] US COMPRE SINGLE F/U with URMFMUSR3   [facility] [place] ([facility])    [address]  [facility] [id number] [phone]           Wear comfortable clothes and leave your valuables at home.            May 11, 2017 10:45 AM CDT   Radiology MD with EVELIN WILDE MD   [facility] - [place] ([facility])    [address]  [facility] [address] [phone]           Please arrive at the time given for your first appointment.  This visit is used internally to schedule the physician's time during your ultrasound.            May 12, 2017  1:00 PM CDT   ESTABLISHED PRENATAL with Shahida Raphael MD   [facility] ([facility])    [address] [place] [address]  [facility] [id number] [phone]              Who to contact     If you have questions or need follow up information about today's clinic visit or your schedule please contact [facility] MATERNAL FETAL MEDICINE [facility] directly at [phone].  Normal or non-critical lab and imaging results will be communicated to you by MyChart, letter or phone within 4 business days after the clinic has received the results. If you do not hear  from us within 7 days, please contact the clinic through Sandbox or phone. If you have a critical or abnormal lab result, we will notify you by phone as soon as possible.  Submit refill requests through Sandbox or call your pharmacy and they will forward the refill request to us. Please allow 3 business days for your refill to be completed.          Additional Information About Your Visit        TekoraharShopatron Information     Sandbox gives you secure access to your electronic health record. If you see a primary care provider, you can also send messages to your care team and make appointments. If you have questions, please call your primary care clinic.  If you do not have a primary care provider, please call [phone] and they will assist you.        Care EveryWhere ID     This is your Care EveryWhere ID. This could be used by other organizations to access your [place] medical records  [id number]        Your Vitals Were     Last Period                   08/12/2016            Blood Pressure from Last 3 Encounters:   04/19/17 95/65   04/14/17 90/63   03/28/17 99/71    Weight from Last 3 Encounters:   04/19/17 64.5 kg (142 lb 4.8 oz)   04/14/17 65.4 kg (144 lb 1.6 oz)   03/28/17 63.9 kg (140 lb 12.8 oz)              Today, you had the following     No orders found for display       Primary Care Provider Office Phone # Fax #    Ana Rosa YOLA Harvey APRAHMET [phone]       [facility] [address] [facility] [address]        Thank you!     Thank you for choosing [facility]  for your care. Our goal is always to provide you with excellent care. Hearing back from our patients is one way we can continue to improve our services. Please take a few minutes to complete the written survey that you may receive in the mail after your visit with us. Thank you!             Your Updated Medication List - Protect others around you: Learn how to safely use, store and throw away  your medicines at www.disposemymeds.org.          This list is accurate as of: 4/24/17  4:05 PM.  Always use your most recent med list.                   Brand Name Dispense Instructions for use    acetone (Urine) test Strp     20 each    Use to check urine for ketones every morning.       blood glucose monitoring lancets     1 Box    Use to test blood sugars 4 times daily.       blood glucose monitoring test strip    ONE TOUCH VERIO IQ    150 strip    Use to test blood sugars 4 times daily (before breakfast and 1 hour after start of each meal).       glyBURIDE 2.5 MG tablet    DIABETA /MICRONASE    90 tablet    Take 1 tablet (2.5 mg) by mouth daily (with breakfast)       prenatal multivitamin  plus iron 27-0.8 MG Tabs per tablet      Take 1 tablet by mouth daily

## 2017-04-27 ENCOUNTER — HOSPITAL ENCOUNTER (OUTPATIENT)
Dept: ULTRASOUND IMAGING | Facility: CLINIC | Age: 37
Discharge: HOME | End: 2017-04-27
Attending: OBSTETRICS & GYNECOLOGY | Admitting: OBSTETRICS & GYNECOLOGY
Payer: COMMERCIAL

## 2017-04-27 ENCOUNTER — OFFICE VISIT (OUTPATIENT)
Dept: MATERNAL FETAL MEDICINE | Facility: CLINIC | Age: 37
End: 2017-04-27
Attending: OBSTETRICS & GYNECOLOGY
Payer: COMMERCIAL

## 2017-04-27 DIAGNOSIS — O24.415 GESTATIONAL DIABETES MELLITUS TREATED WITH ORAL HYPOGLYCEMIC THERAPY: Primary | ICD-10-CM

## 2017-04-27 DIAGNOSIS — O24.415 GESTATIONAL DIABETES MELLITUS TREATED WITH ORAL HYPOGLYCEMIC THERAPY: ICD-10-CM

## 2017-04-27 PROCEDURE — 76819 FETAL BIOPHYS PROFIL W/O NST: CPT

## 2017-04-27 NOTE — PROGRESS NOTES
"Please see \"Imaging\" tab under \"Chart Review\" for details of today's US at the [facility].    Chemo Marshall MD  Maternal-Fetal Medicine      "

## 2017-04-27 NOTE — MR AVS SNAPSHOT
After Visit Summary   4/27/2017    Liam Ellis    MRN: [id number]           Patient Information     Date Of Birth          1980        Visit Information        Provider Department      4/27/2017 12:15 PM Chemo Marshall MD [facility] Maternal Fetal Medicine - [place]        Today's Diagnoses     Gestational diabetes mellitus treated with oral hypoglycemic therapy    -  1       Follow-ups after your visit        Your next 10 appointments already scheduled     Apr 28, 2017 11:30 AM CDT   ESTABLISHED PRENATAL with Shahida Raphael MD   [facility] ([facility])    [address]  [address]  [facility] [address]   [phone]            May 01, 2017  8:00 AM CDT   [facility] BPP SINGLE with URMFMUSR1   [facility] - [facility])    [address]  [facility] [address]   [phone]            May 01, 2017  8:30 AM CDT   Radiology MD with EVELIN WILDE MD   [facility] Maternal Fetal Medicine - [facility])    [address]  [facility] [address]   [phone]           Please arrive at the time given for your first appointment.  This visit is used internally to schedule the physician's time during your ultrasound.            May 04, 2017  9:30 AM CDT   RM BPP SINGLE with URMFMUSR2   [facility] Maternal Fetal Medicine Ultrasound - [place] ([facility])    [address]  [facility] [address]   [phone]            May 04, 2017 10:00 AM CDT   Radiology MD with EVELIN WILDE MD   [facility] - [place] ([facility])    [address]  [facility] [address]   [phone]           Please arrive at the time given for your first appointment.  This visit is used internally to schedule the physician's time during your ultrasound.             May 11, 2017 10:15 AM CDT   MFM US COMPRE SINGLE F/U with URMFMUSR3   [facility] Maternal Fetal Medicine Ultrasound - [place] ([facility])    [address]  [facility] [address] [phone]           Wear comfortable clothes and leave your valuables at home.            May 11, 2017 10:45 AM CDT   Radiology MD with UR CHANI WALKER   [facility] Maternal Fetal Medicine - [facility])    [address]  [facility] [address] [phone]           Please arrive at the time given for your first appointment.  This visit is used internally to schedule the physician's time during your ultrasound.            May 12, 2017  1:00 PM CDT   ESTABLISHED PRENATAL with Shahida Raphael MD   [facility]    [address] [place] [address]  [facility] [address] [phone]              Who to contact     If you have questions or need follow up information about today's clinic visit or your schedule please contact [facility] MATERNAL FETAL MEDICINE [facility] directly at [phone].  Normal or non-critical lab and imaging results will be communicated to you by MyChart, letter or phone within 4 business days after the clinic has received the results. If you do not hear from us within 7 days, please contact the clinic through MyChart or phone. If you have a critical or abnormal lab result, we will notify you by phone as soon as possible.  Submit refill requests through [email or web address] or call your pharmacy and they will forward the refill request to us. Please allow 3 business days for your refill to be completed.          Additional Information About Your Visit        Intersection Technologieshart Information     [email or web address] gives you secure access to your electronic health record. If you see a primary care provider, you can also send messages to your care team and make appointments. If you have questions, please  call your primary care clinic.  If you do not have a primary care provider, please call [phone] and they will assist you.        Care EveryWhere ID     This is your Care EveryWhere ID. This could be used by other organizations to access your [place] medical records  [id number]        Your Vitals Were     Last Period                   08/12/2016            Blood Pressure from Last 3 Encounters:   04/19/17 95/65   04/14/17 90/63   03/28/17 99/71    Weight from Last 3 Encounters:   04/19/17 64.5 kg (142 lb 4.8 oz)   04/14/17 65.4 kg (144 lb 1.6 oz)   03/28/17 63.9 kg (140 lb 12.8 oz)              Today, you had the following     No orders found for display       Primary Care Provider Office Phone # Fax #    ELLEN Stanford [phone]       [facility] [address] [facility] [address]        Thank you!     Thank you for choosing [facility]  for your care. Our goal is always to provide you with excellent care. Hearing back from our patients is one way we can continue to improve our services. Please take a few minutes to complete the written survey that you may receive in the mail after your visit with us. Thank you!             Your Updated Medication List - Protect others around you: Learn how to safely use, store and throw away your medicines at www.disposemymeds.org.          This list is accurate as of: 4/27/17 12:19 PM.  Always use your most recent med list.                   Brand Name Dispense Instructions for use    acetone (Urine) test Strp     20 each    Use to check urine for ketones every morning.       blood glucose monitoring lancets     1 Box    Use to test blood sugars 4 times daily.       blood glucose monitoring test strip    ONE TOUCH VERIO IQ    150 strip    Use to test blood sugars 4 times daily (before breakfast and 1 hour after start of each meal).       glyBURIDE 2.5 MG tablet    DIABETA /MICRONASE    90 tablet    Take  1 tablet (2.5 mg) by mouth daily (with breakfast)       prenatal multivitamin  plus iron 27-0.8 MG Tabs per tablet      Take 1 tablet by mouth daily

## 2017-04-28 ENCOUNTER — PRENATAL OFFICE VISIT (OUTPATIENT)
Dept: OBGYN | Facility: CLINIC | Age: 37
End: 2017-04-28
Payer: COMMERCIAL

## 2017-04-28 VITALS
HEIGHT: 63 IN | TEMPERATURE: 97.4 F | WEIGHT: 145.7 LBS | SYSTOLIC BLOOD PRESSURE: 105 MMHG | HEART RATE: 85 BPM | BODY MASS INDEX: 25.82 KG/M2 | DIASTOLIC BLOOD PRESSURE: 69 MMHG

## 2017-04-28 DIAGNOSIS — Z34.00 SUPERVISION OF NORMAL FIRST PREGNANCY, ANTEPARTUM: ICD-10-CM

## 2017-04-28 DIAGNOSIS — O24.415 GESTATIONAL DIABETES MELLITUS (GDM) IN THIRD TRIMESTER CONTROLLED ON ORAL HYPOGLYCEMIC DRUG: Primary | ICD-10-CM

## 2017-04-28 LAB — HGB BLD-MCNC: 11.3 G/DL (ref 11.7–15.7)

## 2017-04-28 PROCEDURE — 87653 STREP B DNA AMP PROBE: CPT | Performed by: OBSTETRICS & GYNECOLOGY

## 2017-04-28 PROCEDURE — 36416 COLLJ CAPILLARY BLOOD SPEC: CPT | Performed by: OBSTETRICS & GYNECOLOGY

## 2017-04-28 PROCEDURE — 99207 ZZC PRENATAL VISIT: CPT | Performed by: OBSTETRICS & GYNECOLOGY

## 2017-04-28 PROCEDURE — 00000218 ZZHCL STATISTIC OBHBG - HEMOGLOBIN: Performed by: OBSTETRICS & GYNECOLOGY

## 2017-04-28 NOTE — MR AVS SNAPSHOT
After Visit Summary   4/28/2017    Liam Ellis    MRN: [id number]           Patient Information     Date Of Birth          1980        Visit Information        Provider Department      4/28/2017 11:30 AM Shahida Raphael MD [facility]        Today's Diagnoses     Gestational diabetes mellitus (GDM) in third trimester controlled on oral hypoglycemic drug    -  1    Supervision of normal first pregnancy, antepartum           Follow-ups after your visit        Your next 10 appointments already scheduled     May 01, 2017  8:00 AM CDT   M BPP SINGLE with URMFMUSR1   [facility])    [address]  [facility] [address]   [phone]            May 01, 2017  8:30 AM CDT   Radiology MD with EVELIN WILDE MD   [facility] [place] ([facility])    [address]  [facility] [place] [address]   [phone]           Please arrive at the time given for your first appointment.  This visit is used internally to schedule the physician's time during your ultrasound.            May 02, 2017  9:00 AM CDT   ESTABLISHED PRENATAL with Shahida Raphael MD   [facility] ([facility])    [address] [facility] [address]  [facility] [address]   [phone]            May 04, 2017  9:30 AM CDT   [facility] BPP SINGLE with URMFMUSR2   [facility] [place] ([facility])    [address]  [facility] [address]   [phone]            May 04, 2017 10:00 AM CDT   Radiology MD with EVELIN WILDE MD   [facility] [place] ([facility])    [address]  [facility] [place] [address]   [phone]           Please arrive at the time given for your first appointment.  This visit is used internally  to schedule the physician's time during your ultrasound.            May 11, 2017 10:15 AM CDT   MFM US COMPRE SINGLE F/U with URMFMUSR3   [facility] - [place] ([facility])    [address]  [facility] [address] [phone]           Wear comfortable clothes and leave your valuables at home.            May 11, 2017 10:45 AM CDT   Radiology MD with UR MFM MD   [facility] - [facility])    [address]  [facility] [address] [phone]           Please arrive at the time given for your first appointment.  This visit is used internally to schedule the physician's time during your ultrasound.            May 12, 2017  1:00 PM CDT   ESTABLISHED PRENATAL with Shahida Raphael MD   [facility] [place]    [address] [place] [address] [phone]              Who to contact     If you have questions or need follow up information about today's clinic visit or your schedule please contact [facility] directly at [phone].  Normal or non-critical lab and imaging results will be communicated to you by IQR Consultinghart, letter or phone within 4 business days after the clinic has received the results. If you do not hear from us within 7 days, please contact the clinic through [employer] or phone. If you have a critical or abnormal lab result, we will notify you by phone as soon as possible.  Submit refill requests through YeahMobi or call your pharmacy and they will forward the refill request to us. Please allow 3 business days for your refill to be completed.          Additional Information About Your Visit        IQR ConsultingharOxley's Extra Information     YeahMobi gives you secure access to your electronic health record. If you see a primary care provider, you can also send messages to your care team and make  "appointments. If you have questions, please call your primary care clinic.  If you do not have a primary care provider, please call [phone] and they will assist you.        Care EveryWhere ID     This is your Care EveryWhere ID. This could be used by other organizations to access your [place] medical records  [id number]        Your Vitals Were     Pulse Temperature Height Last Period BMI (Body Mass Index)       85 97.4  F (36.3  C) (Oral) 5' 2.5\" (1.588 m) 08/12/2016 26.22 kg/m2        Blood Pressure from Last 3 Encounters:   04/28/17 105/69   04/19/17 95/65   04/14/17 90/63    Weight from Last 3 Encounters:   04/28/17 145 lb 11.2 oz (66.1 kg)   04/19/17 142 lb 4.8 oz (64.5 kg)   04/14/17 144 lb 1.6 oz (65.4 kg)              We Performed the Following     Group B strep PCR     OB hemoglobin        Primary Care Provider Office Phone # Fax #    ELLEN Stanford [phone]       [facility] [address]  [facility] [address]        Thank you!     Thank you for choosing [facility]  for your care. Our goal is always to provide you with excellent care. Hearing back from our patients is one way we can continue to improve our services. Please take a few minutes to complete the written survey that you may receive in the mail after your visit with us. Thank you!             Your Updated Medication List - Protect others around you: Learn how to safely use, store and throw away your medicines at www.disposemymeds.org.          This list is accurate as of: 4/28/17 12:01 PM.  Always use your most recent med list.                   Brand Name Dispense Instructions for use    acetone (Urine) test Strp     20 each    Use to check urine for ketones every morning.       blood glucose monitoring lancets     1 Box    Use to test blood sugars 4 times daily.       blood glucose monitoring test strip    ONE TOUCH VERIO IQ    150 strip    Use to test blood sugars 4 times daily " (before breakfast and 1 hour after start of each meal).       glyBURIDE 2.5 MG tablet    DIABETA /MICRONASE    90 tablet    Take 1 tablet (2.5 mg) by mouth daily (with breakfast)       prenatal multivitamin  plus iron 27-0.8 MG Tabs per tablet      Take 1 tablet by mouth daily

## 2017-04-28 NOTE — PROGRESS NOTES
37w0d Feeling ok, no c/o.  Good fm.  No ctx that she can tell.  BS have risen, especially after breakfast.  All her FBS are <95.  1hr post bkft are ranging mostly 150-170, a few are within range and one at 190.  Most of her documented meals are bagels and peanut butter or pancakes and syrup.  Discussed switching to lower carb breakfast and alternatives discussed.  Pp lunch and dinner have about 25% above range. Will also increase glyburide to 5mg am.  Plan IOL at 39wks, 5/12, but if BS still high next week, may need IOL earlier than that.  GBS today.   Labor instructions and kick counts reviewed. RTC early next week to check BS.  veronika

## 2017-04-29 ENCOUNTER — TELEPHONE (OUTPATIENT)
Dept: NURSING | Facility: CLINIC | Age: 37
End: 2017-04-29

## 2017-04-29 LAB
GP B STREP DNA SPEC QL NAA+PROBE: NORMAL
SPECIMEN SOURCE: NORMAL

## 2017-04-29 ASSESSMENT — PATIENT HEALTH QUESTIONNAIRE - PHQ9: SUM OF ALL RESPONSES TO PHQ QUESTIONS 1-9: 1

## 2017-04-29 NOTE — TELEPHONE ENCOUNTER
"Call Type: Triage Call    Presenting Problem: Red flag symptom call. \"I just need to talk to my  doctor, Dr. Raphael\" Liam reports that she is 37 weeks pregnant.  Glyburide dose increased yesterday from 2.5mg to 5mg. BG this AM was  in the 140s, she took her Glyburide and had some breakfast. At 10am  Liam's BG was 46 and she felt dizzy and flushed, ate some food and  BG now 78. Triage guidelines recommend to call a provider within 8  hours. [facility] paged on call provider, Dr. Raphael, via Smart Web at  10:57am to call Liam back directly at [phone]. FNA advised  that if no call back within 20-30mins, she should call FNA back.  Caller verbalized understanding of directives and had no further  questions.  Triage Note:  Guideline Title: Diabetes: Pregnancy  Recommended Disposition: Call Provider within 8 Hours  Original Inclination: Did not know what to do  Override Disposition:  Intended Action: Call PCP/HCP  Physician Contacted: Yes  New or increasing symptoms OR glucose not within provider defined guidelines AND  prescribed change in medication or treatment plan in last 72 hours ?  YES  Signs of dehydration ? NO  Unconscious now ? NO  Fluid leaking from vagina or change in vaginal discharge ? NO  Urine ketones test large (3+ to 4+) ? NO  Diabetic with foot problems ? NO  New or worsening signs and symptoms that may indicate shock ? NO  More than 37 weeks gestation AND contractions ? NO  New seizure now or within last 6 hours ? NO  Temperature of 100 F (37.7 C) or greater ? NO  Two or more hypoglycemic episodes (now resolved) in the last week AND unusual for  the individual ? NO  Blood sugar 250 mg/dl or higher even after taking extra insulin ? NO  New onset abdominal pain ? NO  Any signs/symptoms of severe hypoglycemia or blood sugar less than 55 mg/dl AND  unable to follow personal action plan or continues 20 minutes after treatment ?  NO  New or increasing symptoms or glucose out of control as defined by " provider or  action plan AND taking medications/following therapy as prescribed ? NO  New or increasing symptoms OR glucose not within provider defined guidelines AND  not taking medications/following treatment plan ? NO  Signs and symptoms of ketoacidosis AND blood sugar more than 300 mg/dl ? NO  37 weeks gestation or less AND contractions ? NO  Known or suspected intentional overdose of insulin or oral hypoglycemic drugs ? NO  Known or suspected accidental overdose of rapid or short acting insulin or oral  hypoglycemic drugs ? NO  Known or suspected accidental overdose of intermediate or long acting insulin or  oral hypoglycemic drugs ? NO  Unable to retain food or fluids for 24 hours or more due to recurrent vomiting ? NO  Physician Instructions:  Care Advice: Call  if any loss of consciousness,  difficult to  awaken, slow to respond, or new onset of confused thinking.  Low Blood Sugar Treatment:    - Always have some form of glucose available  for use.  This may include glucose tablets or hard candies.     -  When  blood sugar is known to be low, take 4 oz. (0.1 liter) of juice, 3 glucose  tablets, or 5-6 hard candies.    - If unavailable, take 10 to 15 grams of  any carbohydrate:           -- 6-8 oz. (.15 to .2 liter) skim or 1% milk  -- candy bar, fruit, cheese or crackers     - Retest blood sugar  after 15 to 20 minutes.     - If blood sugar still less than 80 mg/dl,  continue treating and testing until blood sugar reaches 80 mg/dl.   - Be  careful not to overtreat.

## 2017-05-01 ENCOUNTER — OFFICE VISIT (OUTPATIENT)
Dept: MATERNAL FETAL MEDICINE | Facility: CLINIC | Age: 37
End: 2017-05-01
Attending: OBSTETRICS & GYNECOLOGY
Payer: COMMERCIAL

## 2017-05-01 ENCOUNTER — HOSPITAL ENCOUNTER (OUTPATIENT)
Dept: ULTRASOUND IMAGING | Facility: CLINIC | Age: 37
Discharge: HOME | End: 2017-05-01
Attending: OBSTETRICS & GYNECOLOGY | Admitting: OBSTETRICS & GYNECOLOGY
Payer: COMMERCIAL

## 2017-05-01 DIAGNOSIS — O24.415 GESTATIONAL DIABETES MELLITUS TREATED WITH ORAL HYPOGLYCEMIC THERAPY: Primary | ICD-10-CM

## 2017-05-01 DIAGNOSIS — O24.415 GESTATIONAL DIABETES MELLITUS TREATED WITH ORAL HYPOGLYCEMIC THERAPY: ICD-10-CM

## 2017-05-01 PROCEDURE — 76819 FETAL BIOPHYS PROFIL W/O NST: CPT

## 2017-05-01 NOTE — MR AVS SNAPSHOT
After Visit Summary   5/1/2017    Liam Ellis    MRN: [id number]           Patient Information     Date Of Birth          1980        Visit Information        Provider Department      5/1/2017 8:30 AM Agustina Marie DO [facility]        Today's Diagnoses     Gestational diabetes mellitus treated with oral hypoglycemic therapy    -  1       Follow-ups after your visit        Your next 10 appointments already scheduled     May 02, 2017  9:00 AM CDT   ESTABLISHED PRENATAL with Shahida Raphael MD   [facility] ([facility])    [address]  [address]  [facility] [address]   [phone]            May 04, 2017  9:30 AM CDT   [facility] BPP SINGLE with [id number]   [facility] - [facility])    [address]  [facility] [address] [phone]            May 04, 2017 10:00 AM CDT   Radiology MD with EVELIN WILDE MD   [facility])    [address]  [facility] [address]   [phone]           Please arrive at the time given for your first appointment.  This visit is used internally to schedule the physician's time during your ultrasound.            May 11, 2017 10:15 AM CDT   [facility] US COMPRE SINGLE F/U with [id number]   [facility] - [place] ([facility])    [address]  [facility] [address] [phone]           Wear comfortable clothes and leave your valuables at home.            May 11, 2017 10:45 AM CDT   Radiology MD with EVELIN WILDE MD   [facility] [place] ([facility])    [address]  [facility] [address]   [phone]           Please arrive at the time given for your first appointment.  This visit  is used internally to schedule the physician's time during your ultrasound.            May 12, 2017  1:00 PM CDT   ESTABLISHED PRENATAL with Shahida Raphael MD   [facility] ([facility])    [address] [place] [address] [phone]              Who to contact     If you have questions or need follow up information about today's clinic visit or your schedule please contact [facility] directly at [phone].  Normal or non-critical lab and imaging results will be communicated to you by [employer].hart, letter or phone within 4 business days after the clinic has received the results. If you do not hear from us within 7 days, please contact the clinic through PAAYt or phone. If you have a critical or abnormal lab result, we will notify you by phone as soon as possible.  Submit refill requests through [employer] or call your pharmacy and they will forward the refill request to us. Please allow 3 business days for your refill to be completed.          Additional Information About Your Visit        [employer]iant [employer]arm Information     [employer] gives you secure access to your electronic health record. If you see a primary care provider, you can also send messages to your care team and make appointments. If you have questions, please call your primary care clinic.  If you do not have a primary care provider, please call [phone] and they will assist you.        Care EveryWhere ID     This is your Care EveryWhere ID. This could be used by other organizations to access your [place] medical records  [id number]        Your Vitals Were     Last Period                   08/12/2016            Blood Pressure from Last 3 Encounters:   04/28/17 105/69   04/19/17 95/65   04/14/17 90/63    Weight from Last 3 Encounters:   04/28/17 66.1 kg (145 lb 11.2 oz)   04/19/17 64.5 kg (142 lb 4.8 oz)   04/14/17 65.4 kg (144 lb 1.6 oz)              Today, you had  the following     No orders found for display       Primary Care Provider Office Phone # Fax #    ELLEN Stanford [phone]       [facility] [address]  [facility] [address]        Thank you!     Thank you for choosing [facility]  for your care. Our goal is always to provide you with excellent care. Hearing back from our patients is one way we can continue to improve our services. Please take a few minutes to complete the written survey that you may receive in the mail after your visit with us. Thank you!             Your Updated Medication List - Protect others around you: Learn how to safely use, store and throw away your medicines at www.disposemymeds.org.          This list is accurate as of: 5/1/17  9:11 AM.  Always use your most recent med list.                   Brand Name Dispense Instructions for use    acetone (Urine) test Strp     20 each    Use to check urine for ketones every morning.       blood glucose monitoring lancets     1 Box    Use to test blood sugars 4 times daily.       blood glucose monitoring test strip    ONE TOUCH VERIO IQ    150 strip    Use to test blood sugars 4 times daily (before breakfast and 1 hour after start of each meal).       glyBURIDE 2.5 MG tablet    DIABETA /MICRONASE    90 tablet    Take 1 tablet (2.5 mg) by mouth daily (with breakfast)       prenatal multivitamin  plus iron 27-0.8 MG Tabs per tablet      Take 1 tablet by mouth daily

## 2017-05-02 ENCOUNTER — PRENATAL OFFICE VISIT (OUTPATIENT)
Dept: OBGYN | Facility: CLINIC | Age: 37
End: 2017-05-02
Payer: COMMERCIAL

## 2017-05-02 VITALS
WEIGHT: 146.9 LBS | TEMPERATURE: 97.6 F | BODY MASS INDEX: 26.03 KG/M2 | HEART RATE: 83 BPM | DIASTOLIC BLOOD PRESSURE: 74 MMHG | SYSTOLIC BLOOD PRESSURE: 108 MMHG | HEIGHT: 63 IN

## 2017-05-02 DIAGNOSIS — O24.415 GESTATIONAL DIABETES MELLITUS (GDM) IN THIRD TRIMESTER CONTROLLED ON ORAL HYPOGLYCEMIC DRUG: Primary | ICD-10-CM

## 2017-05-02 PROCEDURE — 99207 ZZC PRENATAL VISIT: CPT | Performed by: OBSTETRICS & GYNECOLOGY

## 2017-05-02 RX ORDER — SODIUM CHLORIDE, SODIUM LACTATE, POTASSIUM CHLORIDE, CALCIUM CHLORIDE 600; 310; 30; 20 MG/100ML; MG/100ML; MG/100ML; MG/100ML
INJECTION, SOLUTION INTRAVENOUS CONTINUOUS
Status: CANCELLED | OUTPATIENT
Start: 2017-05-02

## 2017-05-02 RX ORDER — NALOXONE HYDROCHLORIDE 0.4 MG/ML
.1-.4 INJECTION, SOLUTION INTRAMUSCULAR; INTRAVENOUS; SUBCUTANEOUS
Status: CANCELLED | OUTPATIENT
Start: 2017-05-02

## 2017-05-02 RX ORDER — CARBOPROST TROMETHAMINE 250 UG/ML
250 INJECTION, SOLUTION INTRAMUSCULAR
Status: CANCELLED | OUTPATIENT
Start: 2017-05-02

## 2017-05-02 RX ORDER — METHYLERGONOVINE MALEATE 0.2 MG/ML
200 INJECTION INTRAVENOUS
Status: CANCELLED | OUTPATIENT
Start: 2017-05-02

## 2017-05-02 RX ORDER — IBUPROFEN 200 MG
800 TABLET ORAL
Status: CANCELLED | OUTPATIENT
Start: 2017-05-02

## 2017-05-02 RX ORDER — FENTANYL CITRATE 50 UG/ML
50-100 INJECTION, SOLUTION INTRAMUSCULAR; INTRAVENOUS
Status: CANCELLED | OUTPATIENT
Start: 2017-05-02

## 2017-05-02 RX ORDER — ACETAMINOPHEN 325 MG/1
650 TABLET ORAL EVERY 4 HOURS PRN
Status: CANCELLED | OUTPATIENT
Start: 2017-05-02

## 2017-05-02 RX ORDER — OXYTOCIN/0.9 % SODIUM CHLORIDE 30/500 ML
100-340 PLASTIC BAG, INJECTION (ML) INTRAVENOUS CONTINUOUS PRN
Status: CANCELLED | OUTPATIENT
Start: 2017-05-02

## 2017-05-02 RX ORDER — ONDANSETRON 2 MG/ML
4 INJECTION INTRAMUSCULAR; INTRAVENOUS EVERY 6 HOURS PRN
Status: CANCELLED | OUTPATIENT
Start: 2017-05-02

## 2017-05-02 RX ORDER — OXYCODONE AND ACETAMINOPHEN 5; 325 MG/1; MG/1
1 TABLET ORAL
Status: CANCELLED | OUTPATIENT
Start: 2017-05-02

## 2017-05-02 RX ORDER — OXYTOCIN 10 [USP'U]/ML
10 INJECTION, SOLUTION INTRAMUSCULAR; INTRAVENOUS
Status: CANCELLED | OUTPATIENT
Start: 2017-05-02

## 2017-05-02 NOTE — PROGRESS NOTES
37w4d Feeling ok, no c/o.  Good fm.  She took 5mg glyburide this weekend and bs went low to 46.  Stopped and has only used 2.5mg since then.  She has changed her bkft diet and bs have been in range post bkft.  She has increased her snacks (flavored yogurt) and lunch and dinner, so those numbers a re a little higher and out of range.  Discussed lower carb snacks that can also fill her up and adding to her breakfast.  She eats rice with most meals, does not portion, so discussed portioning and maybe cutting down her current portion of rice to help with lunch and dinner values.  She has her last finals one week from today, worried about missing those if she needs to be delivered early.  I explained she is borderline right now.  So far, baby's growth  And BPps have been ok, so if we can get diet under better control, can go to 39wks, or at least until after her last final.  So, she will see me again Friday after making some diet changes and we'll make final delivery plan.  veronika

## 2017-05-02 NOTE — MR AVS SNAPSHOT
After Visit Summary   5/2/2017    Liam Ellis    MRN: [id number]           Patient Information     Date Of Birth          1980        Visit Information        Provider Department      5/2/2017 9:00 AM Shahida Raphael MD [facility]        Today's Diagnoses     Gestational diabetes mellitus (GDM) in third trimester controlled on oral hypoglycemic drug    -  1       Follow-ups after your visit        Your next 10 appointments already scheduled     May 04, 2017  9:30 AM CDT   [facility] BPP SINGLE with [id number]   [facility])    [address]  [facility] [address]   [phone]            May 04, 2017 10:00 AM CDT   Radiology MD with EVELIN WILDE MD   [facility])    [address]  [facility] [address]   [phone]           Please arrive at the time given for your first appointment.  This visit is used internally to schedule the physician's time during your ultrasound.            May 11, 2017 10:15 AM CDT   [facility] US COMPRE SINGLE F/U with URMFMUSR3   [facility] - [place] ([facility])    [address]  [facility] [address] [phone]           Wear comfortable clothes and leave your valuables at home.            May 11, 2017 10:45 AM CDT   Radiology MD with EVELIN WILDE MD   [facility])    [address]  [facility] [address]   [phone]           Please arrive at the time given for your first appointment.  This visit is used internally to schedule the physician's time during your ultrasound.            May 12, 2017  1:00 PM CDT   ESTABLISHED PRENATAL with Shahida Raphael MD   [facility] ([facility])  "   [address] [place] [address] [phone]              Who to contact     If you have questions or need follow up information about today's clinic visit or your schedule please contact [facility] directly at [phone].  Normal or non-critical lab and imaging results will be communicated to you by MyChart, letter or phone within 4 business days after the clinic has received the results. If you do not hear from us within 7 days, please contact the clinic through Zazumhart or phone. If you have a critical or abnormal lab result, we will notify you by phone as soon as possible.  Submit refill requests through [employer] or call your pharmacy and they will forward the refill request to us. Please allow 3 business days for your refill to be completed.          Additional Information About Your Visit        MyChart Information     [employer] gives you secure access to your electronic health record. If you see a primary care provider, you can also send messages to your care team and make appointments. If you have questions, please call your primary care clinic.  If you do not have a primary care provider, please call [phone] and they will assist you.        Care EveryWhere ID     This is your Care EveryWhere ID. This could be used by other organizations to access your [place] medical records  [id number]        Your Vitals Were     Pulse Temperature Height Last Period BMI (Body Mass Index)       83 97.6  F (36.4  C) (Oral) 5' 2.5\" (1.588 m) 08/12/2016 26.44 kg/m2        Blood Pressure from Last 3 Encounters:   05/02/17 108/74   04/28/17 105/69   04/19/17 95/65    Weight from Last 3 Encounters:   05/02/17 146 lb 14.4 oz (66.6 kg)   04/28/17 145 lb 11.2 oz (66.1 kg)   04/19/17 142 lb 4.8 oz (64.5 kg)              Today, you had the following     No orders found for display       Primary Care Provider Office Phone # Fax #    ELLEN Stanford [phone] " [phone]       [facility] [address]  [place] [facility] [address]        Thank you!     Thank you for choosing [facility]  for your care. Our goal is always to provide you with excellent care. Hearing back from our patients is one way we can continue to improve our services. Please take a few minutes to complete the written survey that you may receive in the mail after your visit with us. Thank you!             Your Updated Medication List - Protect others around you: Learn how to safely use, store and throw away your medicines at www.disposemymeds.org.          This list is accurate as of: 5/2/17  9:36 AM.  Always use your most recent med list.                   Brand Name Dispense Instructions for use    acetone (Urine) test Strp     20 each    Use to check urine for ketones every morning.       blood glucose monitoring lancets     1 Box    Use to test blood sugars 4 times daily.       blood glucose monitoring test strip    ONE TOUCH VERIO IQ    150 strip    Use to test blood sugars 4 times daily (before breakfast and 1 hour after start of each meal).       glyBURIDE 2.5 MG tablet    DIABETA /MICRONASE    90 tablet    Take 1 tablet (2.5 mg) by mouth daily (with breakfast)       prenatal multivitamin  plus iron 27-0.8 MG Tabs per tablet      Take 1 tablet by mouth daily

## 2017-05-04 ENCOUNTER — HOSPITAL ENCOUNTER (OUTPATIENT)
Dept: ULTRASOUND IMAGING | Facility: CLINIC | Age: 37
Discharge: HOME | End: 2017-05-04
Attending: OBSTETRICS & GYNECOLOGY | Admitting: OBSTETRICS & GYNECOLOGY
Payer: COMMERCIAL

## 2017-05-04 ENCOUNTER — OFFICE VISIT (OUTPATIENT)
Dept: MATERNAL FETAL MEDICINE | Facility: CLINIC | Age: 37
End: 2017-05-04
Attending: OBSTETRICS & GYNECOLOGY
Payer: COMMERCIAL

## 2017-05-04 DIAGNOSIS — O24.415 GESTATIONAL DIABETES MELLITUS (GDM) IN THIRD TRIMESTER CONTROLLED ON ORAL HYPOGLYCEMIC DRUG: Primary | ICD-10-CM

## 2017-05-04 DIAGNOSIS — O24.415 GESTATIONAL DIABETES MELLITUS TREATED WITH ORAL HYPOGLYCEMIC THERAPY: ICD-10-CM

## 2017-05-04 PROCEDURE — 76819 FETAL BIOPHYS PROFIL W/O NST: CPT

## 2017-05-04 NOTE — PROGRESS NOTES
Please refer to ultrasound report under 'Imaging' Studies of 'Chart Review' tabs.    Brent Allen M.D.

## 2017-05-04 NOTE — MR AVS SNAPSHOT
After Visit Summary   5/4/2017    Liam Ellis    MRN: [id number]           Patient Information     Date Of Birth          1980        Visit Information        Provider Department      5/4/2017 10:00 AM Brent Allen MD [facility] [place]        Today's Diagnoses     Gestational diabetes mellitus (GDM) in third trimester controlled on oral hypoglycemic drug    -  1       Follow-ups after your visit        Your next 10 appointments already scheduled     May 05, 2017 11:00 AM CDT   ESTABLISHED PRENATAL with Shahida Raphael MD   [facility] ([facility])    [address]  [address]  [facility] [address]   [phone]            May 09, 2017  3:30 PM CDT   [facility] BPP SINGLE with [id number]   [facility] - [facility])    [address]  [facility] [address] [phone]            May 09, 2017  4:00 PM CDT   Radiology MD with EVELIN WILDE MD   [facility])    [address]  [facility] [address]   [phone]           Please arrive at the time given for your first appointment.  This visit is used internally to schedule the physician's time during your ultrasound.            May 11, 2017 10:15 AM CDT   M US COMPRE SINGLE F/U with [id number]   [facility] [place] ([facility])    [address]  [facility] [address] [phone]           Wear comfortable clothes and leave your valuables at home.            May 11, 2017 10:45 AM CDT   Radiology MD with EVELIN WILDE MD   [facility] [place] ([facility])    [address]  [facility] [address]   [phone]           Please arrive at the time given for your first  appointment.  This visit is used internally to schedule the physician's time during your ultrasound.              Future tests that were ordered for you today     Open Future Orders        Priority Expected Expires Ordered    MFM BPP Single Routine 5/8/2017 3/4/2018 5/4/2017            Who to contact     If you have questions or need follow up information about today's clinic visit or your schedule please contact [facility] FETAL MEDIC[facility] directly at [phone].  Normal or non-critical lab and imaging results will be communicated to you by Payment pluginhart, letter or phone within 4 business days after the clinic has received the results. If you do not hear from us within 7 days, please contact the clinic through [employer] or phone. If you have a critical or abnormal lab result, we will notify you by phone as soon as possible.  Submit refill requests through trueAnthem or call your pharmacy and they will forward the refill request to us. Please allow 3 business days for your refill to be completed.          Additional Information About Your Visit        [employer] Information     [employer] gives you secure access to your electronic health record. If you see a primary care provider, you can also send messages to your care team and make appointments. If you have questions, please call your primary care clinic.  If you do not have a primary care provider, please call [phone] and they will assist you.        Care EveryWhere ID     This is your Care EveryWhere ID. This could be used by other organizations to access your [place] medical records  [id number]        Your Vitals Were     Last Period                   08/12/2016            Blood Pressure from Last 3 Encounters:   05/02/17 108/74   04/28/17 105/69   04/19/17 95/65    Weight from Last 3 Encounters:   05/02/17 66.6 kg (146 lb 14.4 oz)   04/28/17 66.1 kg (145 lb 11.2 oz)   04/19/17 64.5 kg (142 lb 4.8 oz)               Primary Care Provider Office Phone #  Fax #    Ana Rosa Harvey ELLEN [phone]       [facility] [address]  [place] [facility] [address]        Thank you!     Thank you for choosing [facility]  for your care. Our goal is always to provide you with excellent care. Hearing back from our patients is one way we can continue to improve our services. Please take a few minutes to complete the written survey that you may receive in the mail after your visit with us. Thank you!             Your Updated Medication List - Protect others around you: Learn how to safely use, store and throw away your medicines at www.disposemymeds.org.          This list is accurate as of: 5/4/17 11:05 AM.  Always use your most recent med list.                   Brand Name Dispense Instructions for use    acetone (Urine) test Strp     20 each    Use to check urine for ketones every morning.       blood glucose monitoring lancets     1 Box    Use to test blood sugars 4 times daily.       blood glucose monitoring test strip    ONE TOUCH VERIO IQ    150 strip    Use to test blood sugars 4 times daily (before breakfast and 1 hour after start of each meal).       glyBURIDE 2.5 MG tablet    DIABETA /MICRONASE    90 tablet    Take 1 tablet (2.5 mg) by mouth daily (with breakfast)       prenatal multivitamin  plus iron 27-0.8 MG Tabs per tablet      Take 1 tablet by mouth daily

## 2017-05-05 ENCOUNTER — PRENATAL OFFICE VISIT (OUTPATIENT)
Dept: OBGYN | Facility: CLINIC | Age: 37
End: 2017-05-05
Payer: COMMERCIAL

## 2017-05-05 VITALS
HEART RATE: 86 BPM | TEMPERATURE: 97.2 F | WEIGHT: 147.2 LBS | DIASTOLIC BLOOD PRESSURE: 75 MMHG | BODY MASS INDEX: 26.08 KG/M2 | HEIGHT: 63 IN | SYSTOLIC BLOOD PRESSURE: 112 MMHG

## 2017-05-05 DIAGNOSIS — O24.415 GESTATIONAL DIABETES MELLITUS (GDM) IN THIRD TRIMESTER CONTROLLED ON ORAL HYPOGLYCEMIC DRUG: Primary | ICD-10-CM

## 2017-05-05 PROCEDURE — 99207 ZZC PRENATAL VISIT: CPT | Performed by: OBSTETRICS & GYNECOLOGY

## 2017-05-05 NOTE — MR AVS SNAPSHOT
After Visit Summary   5/5/2017    Liam Ellis    MRN: [id number]           Patient Information     Date Of Birth          1980        Visit Information        Provider Department      5/5/2017 11:00 AM Shahida Raphael MD [facility]        Today's Diagnoses     Gestational diabetes mellitus (GDM) in third trimester controlled on oral hypoglycemic drug    -  1       Follow-ups after your visit        Your next 10 appointments already scheduled     May 09, 2017  3:30 PM CDT   [facility] BPP SINGLE with [id number]   [facility] - [facility])    [address]  [facility] [address]   [phone]            May 09, 2017  4:00 PM CDT   Radiology MD with EVELIN WILDE MD   [facility])    [address]  [facility] [address]   [phone]           Please arrive at the time given for your first appointment.  This visit is used internally to schedule the physician's time during your ultrasound.            May 11, 2017 10:15 AM CDT   [facility] US COMPRE SINGLE F/U with URMFMUSR3   [facility] [place] [facility])    [address]  [facility] [address] [phone]           Wear comfortable clothes and leave your valuables at home.            May 11, 2017 10:45 AM CDT   Radiology MD with EVELIN WILDE MD   [facility])    [address]  [facility] [address]   [phone]           Please arrive at the time given for your first appointment.  This visit is used internally to schedule the physician's time during your ultrasound.              Future tests that were ordered for you today     Open Future Orders        Priority Expected Expires Ordered    RM BPP Single  "Routine 5/8/2017 3/4/2018 5/4/2017            Who to contact     If you have questions or need follow up information about today's clinic visit or your schedule please contact [facility] directly at [phone].  Normal or non-critical lab and imaging results will be communicated to you by MyChart, letter or phone within 4 business days after the clinic has received the results. If you do not hear from us within 7 days, please contact the clinic through Virgancehart or phone. If you have a critical or abnormal lab result, we will notify you by phone as soon as possible.  Submit refill requests through LogicNets or call your pharmacy and they will forward the refill request to us. Please allow 3 business days for your refill to be completed.          Additional Information About Your Visit        [employer] Information     LogicNets gives you secure access to your electronic health record. If you see a primary care provider, you can also send messages to your care team and make appointments. If you have questions, please call your primary care clinic.  If you do not have a primary care provider, please call [phone] and they will assist you.        Care EveryWhere ID     This is your Care EveryWhere ID. This could be used by other organizations to access your [place] medical records  [id number]        Your Vitals Were     Pulse Temperature Height Last Period BMI (Body Mass Index)       86 97.2  F (36.2  C) (Oral) 5' 2.5\" (1.588 m) 08/12/2016 26.49 kg/m2        Blood Pressure from Last 3 Encounters:   05/05/17 112/75   05/02/17 108/74   04/28/17 105/69    Weight from Last 3 Encounters:   05/05/17 147 lb 3.2 oz (66.8 kg)   05/02/17 146 lb 14.4 oz (66.6 kg)   04/28/17 145 lb 11.2 oz (66.1 kg)              Today, you had the following     No orders found for display       Primary Care Provider Office Phone # Fax #    ELLEN Stanford [phone]       [facility] Larry LAWRENCE " AHMET COBOS [facility] [address]        Thank you!     Thank you for choosing [facility]  for your care. Our goal is always to provide you with excellent care. Hearing back from our patients is one way we can continue to improve our services. Please take a few minutes to complete the written survey that you may receive in the mail after your visit with us. Thank you!             Your Updated Medication List - Protect others around you: Learn how to safely use, store and throw away your medicines at www.disposemymeds.org.          This list is accurate as of: 5/5/17 11:19 AM.  Always use your most recent med list.                   Brand Name Dispense Instructions for use    acetone (Urine) test Strp     20 each    Use to check urine for ketones every morning.       blood glucose monitoring lancets     1 Box    Use to test blood sugars 4 times daily.       blood glucose monitoring test strip    ONE TOUCH VERIO IQ    150 strip    Use to test blood sugars 4 times daily (before breakfast and 1 hour after start of each meal).       glyBURIDE 2.5 MG tablet    DIABETA /MICRONASE    90 tablet    Take 1 tablet (2.5 mg) by mouth daily (with breakfast)       prenatal multivitamin  plus iron 27-0.8 MG Tabs per tablet      Take 1 tablet by mouth daily

## 2017-05-05 NOTE — PROGRESS NOTES
38w0d Feeling ok, starting to have some ctx, but not regular yet.  Good fm.  She has been working on her diet and last 3 days, all BS have been within range other than single pp dinner of 150.  Will plan for IOL on 5/12 at 39wks as planned and she will continue to monitor diet very closely.   Labor instructions and kick counts reviewed. veronika

## 2017-05-09 ENCOUNTER — OFFICE VISIT (OUTPATIENT)
Dept: MATERNAL FETAL MEDICINE | Facility: CLINIC | Age: 37
End: 2017-05-09
Attending: OBSTETRICS & GYNECOLOGY
Payer: COMMERCIAL

## 2017-05-09 ENCOUNTER — HOSPITAL ENCOUNTER (OUTPATIENT)
Dept: ULTRASOUND IMAGING | Facility: CLINIC | Age: 37
Discharge: HOME | End: 2017-05-09
Attending: OBSTETRICS & GYNECOLOGY | Admitting: OBSTETRICS & GYNECOLOGY
Payer: COMMERCIAL

## 2017-05-09 DIAGNOSIS — O24.415 GESTATIONAL DIABETES MELLITUS (GDM) IN THIRD TRIMESTER CONTROLLED ON ORAL HYPOGLYCEMIC DRUG: ICD-10-CM

## 2017-05-09 DIAGNOSIS — O24.415 GESTATIONAL DIABETES MELLITUS (GDM) IN THIRD TRIMESTER CONTROLLED ON ORAL HYPOGLYCEMIC DRUG: Primary | ICD-10-CM

## 2017-05-09 PROCEDURE — 76819 FETAL BIOPHYS PROFIL W/O NST: CPT

## 2017-05-09 NOTE — MR AVS SNAPSHOT
After Visit Summary   5/9/2017    Liam Ellis    MRN: [id number]           Patient Information     Date Of Birth          1980        Visit Information        Provider Department      5/9/2017 4:00 PM Brent Allen MD [facility] Maternal Fetal Medicine [facility]        Today's Diagnoses     Gestational diabetes mellitus (GDM) in third trimester controlled on oral hypoglycemic drug    -  1       Follow-ups after your visit        Your next 10 appointments already scheduled     May 11, 2017 10:15 AM CDT   [facility] US COMPRE SINGLE F/U with [id number]   [facility] Maternal Fetal Medicine Ultrasound - [facility])    [address]  [facility] [id number] [phone]           Wear comfortable clothes and leave your valuables at home.            May 11, 2017 10:45 AM CDT   Radiology MD with UR CHANI WALKER   [facility] Maternal Fetal Medicine - [facility])    [address]  [facility] [address] [phone]           Please arrive at the time given for your first appointment.  This visit is used internally to schedule the physician's time during your ultrasound.              Who to contact     If you have questions or need follow up information about today's clinic visit or your schedule please contact [facility] MATERNAL FETAL MEDIC[facility] directly at [phone].  Normal or non-critical lab and imaging results will be communicated to you by MyChart, letter or phone within 4 business days after the clinic has received the results. If you do not hear from us within 7 days, please contact the clinic through MyChart or phone. If you have a critical or abnormal lab result, we will notify you by phone as soon as possible.  Submit refill requests through simplifyMD or call your pharmacy and they will forward the refill request to us. Please allow 3 business days for your refill to be completed.           Additional Information About Your Visit        MyChart Information     [employer] gives you secure access to your electronic health record. If you see a primary care provider, you can also send messages to your care team and make appointments. If you have questions, please call your primary care clinic.  If you do not have a primary care provider, please call [phone] and they will assist you.        Care EveryWhere ID     This is your Care EveryWhere ID. This could be used by other organizations to access your [facility] medical records  [id number]        Your Vitals Were     Last Period                   08/12/2016            Blood Pressure from Last 3 Encounters:   05/05/17 112/75   05/02/17 108/74   04/28/17 105/69    Weight from Last 3 Encounters:   05/05/17 66.8 kg (147 lb 3.2 oz)   05/02/17 66.6 kg (146 lb 14.4 oz)   04/28/17 66.1 kg (145 lb 11.2 oz)              Today, you had the following     No orders found for display       Primary Care Provider Office Phone # Fax #    ELLEN Stanford [phone]       [facility] [address] [facility] [address]        Thank you!     Thank you for choosing [facility]  for your care. Our goal is always to provide you with excellent care. Hearing back from our patients is one way we can continue to improve our services. Please take a few minutes to complete the written survey that you may receive in the mail after your visit with us. Thank you!             Your Updated Medication List - Protect others around you: Learn how to safely use, store and throw away your medicines at www.disposemymeds.org.          This list is accurate as of: 5/9/17  4:05 PM.  Always use your most recent med list.                   Brand Name Dispense Instructions for use    acetone (Urine) test Strp     20 each    Use to check urine for ketones every morning.       blood glucose monitoring lancets     1 Box    Use to test  blood sugars 4 times daily.       blood glucose monitoring test strip    ONE TOUCH VERIO IQ    150 strip    Use to test blood sugars 4 times daily (before breakfast and 1 hour after start of each meal).       glyBURIDE 2.5 MG tablet    DIABETA /MICRONASE    90 tablet    Take 1 tablet (2.5 mg) by mouth daily (with breakfast)       prenatal multivitamin  plus iron 27-0.8 MG Tabs per tablet      Take 1 tablet by mouth daily

## 2017-05-11 ENCOUNTER — HOSPITAL ENCOUNTER (OUTPATIENT)
Dept: ULTRASOUND IMAGING | Facility: CLINIC | Age: 37
Discharge: HOME | End: 2017-05-11
Attending: OBSTETRICS & GYNECOLOGY | Admitting: OBSTETRICS & GYNECOLOGY
Payer: COMMERCIAL

## 2017-05-11 ENCOUNTER — OFFICE VISIT (OUTPATIENT)
Dept: MATERNAL FETAL MEDICINE | Facility: CLINIC | Age: 37
End: 2017-05-11
Attending: OBSTETRICS & GYNECOLOGY
Payer: COMMERCIAL

## 2017-05-11 DIAGNOSIS — O24.415 GESTATIONAL DIABETES MELLITUS (GDM) IN THIRD TRIMESTER CONTROLLED ON ORAL HYPOGLYCEMIC DRUG: ICD-10-CM

## 2017-05-11 DIAGNOSIS — O24.415 GESTATIONAL DIABETES MELLITUS TREATED WITH ORAL HYPOGLYCEMIC THERAPY: ICD-10-CM

## 2017-05-11 DIAGNOSIS — O09.512 AMA (ADVANCED MATERNAL AGE) PRIMIGRAVIDA 35+, SECOND TRIMESTER: Primary | ICD-10-CM

## 2017-05-11 PROCEDURE — 76819 FETAL BIOPHYS PROFIL W/O NST: CPT | Performed by: OBSTETRICS & GYNECOLOGY

## 2017-05-11 PROCEDURE — 76816 OB US FOLLOW-UP PER FETUS: CPT

## 2017-05-11 NOTE — MR AVS SNAPSHOT
After Visit Summary   5/11/2017    Liam Ellis    MRN: [id number]           Patient Information     Date Of Birth          1980        Visit Information        Provider Department      5/11/2017 10:45 AM Alycia Shelby, DO [facility] Maternal Fetal [facility]        Today's Diagnoses     AMA (advanced maternal age) primigravida 35+, second trimester    -  1    Gestational diabetes mellitus (GDM) in third trimester controlled on oral hypoglycemic drug           Follow-ups after your visit        Who to contact     If you have questions or need follow up information about today's clinic visit or your schedule please contact [facility] MATERNAL FETAL MEDICINE [facility] directly at [phone].  Normal or non-critical lab and imaging results will be communicated to you by Sooqinihart, letter or phone within 4 business days after the clinic has received the results. If you do not hear from us within 7 days, please contact the clinic through L3t or phone. If you have a critical or abnormal lab result, we will notify you by phone as soon as possible.  Submit refill requests through cashcloud or call your pharmacy and they will forward the refill request to us. Please allow 3 business days for your refill to be completed.          Additional Information About Your Visit        MyChart Information     cashcloud gives you secure access to your electronic health record. If you see a primary care provider, you can also send messages to your care team and make appointments. If you have questions, please call your primary care clinic.  If you do not have a primary care provider, please call [phone] and they will assist you.        Care EveryWhere ID     This is your Care EveryWhere ID. This could be used by other organizations to access your [place] medical records  [id number]        Your Vitals Were     Last Period                   08/12/2016            Blood Pressure from Last 3 Encounters:    05/05/17 112/75   05/02/17 108/74   04/28/17 105/69    Weight from Last 3 Encounters:   05/05/17 66.8 kg (147 lb 3.2 oz)   05/02/17 66.6 kg (146 lb 14.4 oz)   04/28/17 66.1 kg (145 lb 11.2 oz)              Today, you had the following     No orders found for display       Primary Care Provider Office Phone # Fax #    Ana Rosa ACEVES Candice ELLEN [phone]       [facility] [address]  [facility] [address]        Thank you!     Thank you for choosing [facility]  for your care. Our goal is always to provide you with excellent care. Hearing back from our patients is one way we can continue to improve our services. Please take a few minutes to complete the written survey that you may receive in the mail after your visit with us. Thank you!             Your Updated Medication List - Protect others around you: Learn how to safely use, store and throw away your medicines at www.disposemymeds.org.          This list is accurate as of: 5/11/17 11:07 AM.  Always use your most recent med list.                   Brand Name Dispense Instructions for use    acetone (Urine) test Strp     20 each    Use to check urine for ketones every morning.       blood glucose monitoring lancets     1 Box    Use to test blood sugars 4 times daily.       blood glucose monitoring test strip    ONE TOUCH VERIO IQ    150 strip    Use to test blood sugars 4 times daily (before breakfast and 1 hour after start of each meal).       glyBURIDE 2.5 MG tablet    DIABETA /MICRONASE    90 tablet    Take 1 tablet (2.5 mg) by mouth daily (with breakfast)       prenatal multivitamin  plus iron 27-0.8 MG Tabs per tablet      Take 1 tablet by mouth daily

## 2017-05-12 ENCOUNTER — HOSPITAL ENCOUNTER (INPATIENT)
Facility: CLINIC | Age: 37
LOS: 5 days | Discharge: HOME | End: 2017-05-17
Attending: OBSTETRICS & GYNECOLOGY | Admitting: OBSTETRICS & GYNECOLOGY
Payer: COMMERCIAL

## 2017-05-12 DIAGNOSIS — Z98.891 S/P CESAREAN SECTION: Primary | ICD-10-CM

## 2017-05-12 DIAGNOSIS — D62 ACUTE BLOOD LOSS AS CAUSE OF POSTOPERATIVE ANEMIA: ICD-10-CM

## 2017-05-12 PROBLEM — Z34.90 PREGNANCY, OBSTETRICAL CARE: Status: ACTIVE | Noted: 2017-05-12

## 2017-05-12 LAB
BASOPHILS # BLD AUTO: 0 10E9/L (ref 0–0.2)
BASOPHILS NFR BLD AUTO: 0.1 %
DIFFERENTIAL METHOD BLD: ABNORMAL
EOSINOPHIL # BLD AUTO: 0.2 10E9/L (ref 0–0.7)
EOSINOPHIL NFR BLD AUTO: 2 %
ERYTHROCYTE [DISTWIDTH] IN BLOOD BY AUTOMATED COUNT: 13.7 % (ref 10–15)
GLUCOSE BLDC GLUCOMTR-MCNC: 117 MG/DL (ref 70–99)
GLUCOSE BLDC GLUCOMTR-MCNC: 121 MG/DL (ref 70–99)
GLUCOSE BLDC GLUCOMTR-MCNC: 98 MG/DL (ref 70–99)
HCT VFR BLD AUTO: 35.2 % (ref 35–47)
HGB BLD-MCNC: 11.5 G/DL (ref 11.7–15.7)
IMM GRANULOCYTES # BLD: 0 10E9/L (ref 0–0.4)
IMM GRANULOCYTES NFR BLD: 0.5 %
LYMPHOCYTES # BLD AUTO: 1.3 10E9/L (ref 0.8–5.3)
LYMPHOCYTES NFR BLD AUTO: 16.8 %
MCH RBC QN AUTO: 29 PG (ref 26.5–33)
MCHC RBC AUTO-ENTMCNC: 32.7 G/DL (ref 31.5–36.5)
MCV RBC AUTO: 89 FL (ref 78–100)
MONOCYTES # BLD AUTO: 0.5 10E9/L (ref 0–1.3)
MONOCYTES NFR BLD AUTO: 5.9 %
NEUTROPHILS # BLD AUTO: 5.7 10E9/L (ref 1.6–8.3)
NEUTROPHILS NFR BLD AUTO: 74.7 %
NRBC # BLD AUTO: 0 10*3/UL
NRBC BLD AUTO-RTO: 0 /100
PLATELET # BLD AUTO: 295 10E9/L (ref 150–450)
RBC # BLD AUTO: 3.96 10E12/L (ref 3.8–5.2)
T PALLIDUM IGG+IGM SER QL: NEGATIVE
WBC # BLD AUTO: 7.6 10E9/L (ref 4–11)

## 2017-05-12 PROCEDURE — 25000128 H RX IP 250 OP 636: Performed by: OBSTETRICS & GYNECOLOGY

## 2017-05-12 PROCEDURE — 3E0P7GC INTRODUCTION OF OTHER THERAPEUTIC SUBSTANCE INTO FEMALE REPRODUCTIVE, VIA NATURAL OR ARTIFICIAL OPENING: ICD-10-PCS | Performed by: OBSTETRICS & GYNECOLOGY

## 2017-05-12 PROCEDURE — 86923 COMPATIBILITY TEST ELECTRIC: CPT | Performed by: OBSTETRICS & GYNECOLOGY

## 2017-05-12 PROCEDURE — S0191 MISOPROSTOL, ORAL, 200 MCG: HCPCS | Performed by: OBSTETRICS & GYNECOLOGY

## 2017-05-12 PROCEDURE — 00000146 ZZHCL STATISTIC GLUCOSE BY METER IP

## 2017-05-12 PROCEDURE — 86850 RBC ANTIBODY SCREEN: CPT | Performed by: OBSTETRICS & GYNECOLOGY

## 2017-05-12 PROCEDURE — 25000132 ZZH RX MED GY IP 250 OP 250 PS 637: Performed by: OBSTETRICS & GYNECOLOGY

## 2017-05-12 PROCEDURE — 12000030 ZZH R&B OB INTERMEDIATE UMMC

## 2017-05-12 PROCEDURE — 85025 COMPLETE CBC W/AUTO DIFF WBC: CPT | Performed by: OBSTETRICS & GYNECOLOGY

## 2017-05-12 PROCEDURE — 99221 1ST HOSP IP/OBS SF/LOW 40: CPT | Mod: GC | Performed by: OBSTETRICS & GYNECOLOGY

## 2017-05-12 PROCEDURE — 86780 TREPONEMA PALLIDUM: CPT | Performed by: OBSTETRICS & GYNECOLOGY

## 2017-05-12 PROCEDURE — 36415 COLL VENOUS BLD VENIPUNCTURE: CPT | Performed by: OBSTETRICS & GYNECOLOGY

## 2017-05-12 PROCEDURE — 86901 BLOOD TYPING SEROLOGIC RH(D): CPT | Performed by: OBSTETRICS & GYNECOLOGY

## 2017-05-12 PROCEDURE — 86900 BLOOD TYPING SEROLOGIC ABO: CPT | Performed by: OBSTETRICS & GYNECOLOGY

## 2017-05-12 RX ORDER — MISOPROSTOL 100 UG/1
25 TABLET ORAL
Status: DISCONTINUED | OUTPATIENT
Start: 2017-05-12 | End: 2017-05-14

## 2017-05-12 RX ORDER — CARBOPROST TROMETHAMINE 250 UG/ML
250 INJECTION, SOLUTION INTRAMUSCULAR
Status: DISCONTINUED | OUTPATIENT
Start: 2017-05-12 | End: 2017-05-14

## 2017-05-12 RX ORDER — HYDROXYZINE HYDROCHLORIDE 50 MG/1
50-100 TABLET, FILM COATED ORAL
Status: DISCONTINUED | OUTPATIENT
Start: 2017-05-12 | End: 2017-05-14

## 2017-05-12 RX ORDER — NALOXONE HYDROCHLORIDE 0.4 MG/ML
.1-.4 INJECTION, SOLUTION INTRAMUSCULAR; INTRAVENOUS; SUBCUTANEOUS
Status: DISCONTINUED | OUTPATIENT
Start: 2017-05-12 | End: 2017-05-13

## 2017-05-12 RX ORDER — METHYLERGONOVINE MALEATE 0.2 MG/ML
200 INJECTION INTRAVENOUS
Status: DISCONTINUED | OUTPATIENT
Start: 2017-05-12 | End: 2017-05-14

## 2017-05-12 RX ORDER — IBUPROFEN 800 MG/1
800 TABLET, FILM COATED ORAL
Status: DISCONTINUED | OUTPATIENT
Start: 2017-05-12 | End: 2017-05-14

## 2017-05-12 RX ORDER — SODIUM CHLORIDE, SODIUM LACTATE, POTASSIUM CHLORIDE, CALCIUM CHLORIDE 600; 310; 30; 20 MG/100ML; MG/100ML; MG/100ML; MG/100ML
INJECTION, SOLUTION INTRAVENOUS CONTINUOUS
Status: DISCONTINUED | OUTPATIENT
Start: 2017-05-12 | End: 2017-05-14

## 2017-05-12 RX ORDER — OXYTOCIN 10 [USP'U]/ML
10 INJECTION, SOLUTION INTRAMUSCULAR; INTRAVENOUS
Status: DISCONTINUED | OUTPATIENT
Start: 2017-05-12 | End: 2017-05-14

## 2017-05-12 RX ORDER — OXYCODONE AND ACETAMINOPHEN 5; 325 MG/1; MG/1
1 TABLET ORAL
Status: DISCONTINUED | OUTPATIENT
Start: 2017-05-12 | End: 2017-05-14

## 2017-05-12 RX ORDER — MORPHINE SULFATE 10 MG/ML
10 INJECTION, SOLUTION INTRAMUSCULAR; INTRAVENOUS ONCE
Status: COMPLETED | OUTPATIENT
Start: 2017-05-12 | End: 2017-05-12

## 2017-05-12 RX ORDER — FENTANYL CITRATE 50 UG/ML
50-100 INJECTION, SOLUTION INTRAMUSCULAR; INTRAVENOUS
Status: DISCONTINUED | OUTPATIENT
Start: 2017-05-12 | End: 2017-05-14

## 2017-05-12 RX ORDER — OXYTOCIN/0.9 % SODIUM CHLORIDE 30/500 ML
100-340 PLASTIC BAG, INJECTION (ML) INTRAVENOUS CONTINUOUS PRN
Status: DISCONTINUED | OUTPATIENT
Start: 2017-05-12 | End: 2017-05-14

## 2017-05-12 RX ORDER — ACETAMINOPHEN 325 MG/1
650 TABLET ORAL EVERY 4 HOURS PRN
Status: DISCONTINUED | OUTPATIENT
Start: 2017-05-12 | End: 2017-05-14

## 2017-05-12 RX ORDER — ONDANSETRON 2 MG/ML
4 INJECTION INTRAMUSCULAR; INTRAVENOUS EVERY 6 HOURS PRN
Status: DISCONTINUED | OUTPATIENT
Start: 2017-05-12 | End: 2017-05-14

## 2017-05-12 RX ADMIN — Medication 25 MCG: at 15:32

## 2017-05-12 RX ADMIN — Medication 25 MCG: at 10:49

## 2017-05-12 RX ADMIN — HYDROXYZINE HYDROCHLORIDE 100 MG: 50 TABLET, FILM COATED ORAL at 23:31

## 2017-05-12 RX ADMIN — Medication 25 MCG: at 23:31

## 2017-05-12 RX ADMIN — Medication 25 MCG: at 17:45

## 2017-05-12 RX ADMIN — Medication 25 MCG: at 13:11

## 2017-05-12 RX ADMIN — MORPHINE SULFATE 10 MG: 10 INJECTION, SOLUTION INTRAMUSCULAR; INTRAVENOUS at 23:32

## 2017-05-12 NOTE — IP AVS SNAPSHOT
[facility]    [address] [facility] [address]    Phone:  [phone]                                       After Visit Summary   5/12/2017    Liam Ellis    MRN: [id number]           After Visit Summary Signature Page     I have received my discharge instructions, and my questions have been answered. I have discussed any challenges I see with this plan with the nurse or doctor.    ..........................................................................................................................................  Patient/Patient Representative Signature      ..........................................................................................................................................  Patient Representative Print Name and Relationship to Patient    ..................................................               ................................................  Date                                            Time    ..........................................................................................................................................  Reviewed by Signature/Title    ...................................................              ..............................................  Date                                                            Time

## 2017-05-12 NOTE — H&P
L&D History and Physical   May 12, 2017  Liam Ellis  [id number]      HPI: Liam Ellis is a 37 year old  at 39w0d by LMP c/w 11w4d US who presents today for scheduled IOL for GDMA2. This morning, her 1 hour post-prandial blood glucose was 175. They usually run between 130-190 on the glyburide. Her fasting glucose levels are 70-90.    She states that she is feeling well today.   +FM.  No LOF or vaginal bleeding. She has been feeling contractions for the last hour or so every 10 minutes. They are noticeable but not painful.  She denies fever, chills, SOB, chest pain, palpitations, N/V.  No concerns for headache, vision changes, RUQ or epigastric pain.       Her pregnancy is complicated by:  - GDMA2: on glyburide 2.5mg daily. failed GCT and GTT.       OBHX:   Obstetric History       T0      TAB0   SAB0   E0   M0   L0       # Outcome Date GA Lbr David/2nd Weight Sex Delivery Anes PTL Lv   1 Current                   MedicalHX:   Past Medical History:   Diagnosis Date     TMJ (temporomandibular joint syndrome) 12/10/2013       SurgicalHX:   Past Surgical History:   Procedure Laterality Date     APPENDECTOMY         Medications:     No current facility-administered medications on file prior to encounter.   Current Outpatient Prescriptions on File Prior to Encounter:  blood glucose monitoring (ONE TOUCH DELICA) lancets Use to test blood sugars 4 times daily.   blood glucose monitoring (ONE TOUCH VERIO IQ) test strip Use to test blood sugars 4 times daily (before breakfast and 1 hour after start of each meal).   acetone, Urine, test STRP Use to check urine for ketones every morning.   Prenatal Vit-Fe Fumarate-FA (PRENATAL MULTIVITAMIN  PLUS IRON) 27-0.8 MG TABS Take 1 tablet by mouth daily       Allergies:  No Known Allergies    FamilyHX:  Family History   Problem Relation Age of Onset     Hypertension Mother      DIABETES Mother        SocialHX:   Social History     Social History     Marital status:       Spouse name: N/A     Number of children: N/A     Years of education: N/A     Social History Main Topics     Smoking status: Never Smoker     Smokeless tobacco: Never Used     Alcohol use No     Drug use: No     Sexual activity: Yes     Partners: Male     Other Topics Concern     Parent/Sibling W/ Cabg, Mi Or Angioplasty Before 65f 55m? No     Social History Narrative    Caffeine intake/servings daily - 0    Calcium intake/servings daily - 3    Exercise 5 times weekly - describe ; walks    Sunscreen used - Yes    Seatbelts used - Yes    Guns stored in the home - No    Self Breast Exam - Yes    Pap test up to date -  Yes    Eye exam up to date -  Yes    Dental exam up to date -  Yes    DEXA scan up to date -  No    Flex Sig/Colonoscopy up to date -  No    Mammography up to date -  No    Immunizations reviewed and up to date - No    Abuse: Current or Past (Physical, Sexual or Emotional) - No    Do you feel safe in your environment - Yes    Do you cope well with stress - Yes    Do you suffer from insomnia - No    Last updated by: Ana Rosa Hamilton  10/4/2016               ROS:   General: Denies fever, chills  HEENT: Denies headache, blurry vision  CV: Denies chest pain, palpitation  Resp: Denies SOB, cough  GI: Denies nausea, vomiting, constipation, diarrhea  : Denies dysuria, burning, or itching  Neuro: Denies numbness, tingling       Physical Exam:  No data found.    General: AAOx3, NAD, appears generally well  CV: RRR, normal S1/S2, no m/r/g  Lungs: CTAB, non-labored breathing, no wheezes, rales, or rhonchi  Abdomen: soft, gravid, non-tender, EFW 7lbs; cephalic by BSUS  SVE:  Closed/FT/0-3  Extremities: Bilateral LE with trace edema    FHT: 150, moderate variability, accels present, decels absent  [place]: 30-60 second contractions every 7 min    Prenatal ultrasounds:  5/11/17 - Growth US   EFW:  36%tile   EGA:  38w6d by LMP   Placenta: anterior   RUIZ: 10.0cm    Labs:   Hgb 11.5, Plts 295.   Lab  Results   Component Value Date    ABO O 10/04/2016    RH  Pos 10/04/2016    AS Neg 10/04/2016    HEPBANG Nonreactive 10/04/2016    CHPCRT  2011     Negative for C. trachomatis rRNA by transcription mediated amplification.   A negative result by transcription mediated amplification does not preclude the   presence of C. trachomatis infection because results are dependent on proper   and adequate collection, absence of inhibitors, and sufficient rRNA to be   detected.    GCPCRT  2011     Negative for N. gonorrhoeae rRNA by transcription mediated amplification.   A negative result by transcription mediated amplification does not preclude the   presence of N. gonorrhoeae infection because results are dependent on proper   and adequate collection, absence of inhibitors, and sufficient rRNA to be   detected.    TREPAB Negative 10/04/2016    HGB 11.3 (L) 2017       GBS Status:   Lab Results   Component Value Date    GBS  2017     Negative  No GBS DNA detected, presumed negative for GBS or number of bacteria may be   below the limit of detection of the assay.   Assay performed on incubated broth culture of specimen using [employer] real-time   PCR.         Lab Results   Component Value Date    PAP NIL 2015       Assessment: 37 year old  at 39w0d by LMP, here for IOL for GDMA2 on 2.5mg glyburide. Her glucose levels have been moderately controlled recently with one hour PP glucose levels of 130-190.    Plan:  - Admit to labor and delivery for IOL for GDMA2.    - IOL: cervix unfavorable, will start with PO miso Q2hrs   - GBS negative.    - pain management: epidural    -FWB:  Cat I tracing, reactive   - cephalic by BSUS   - EFW by  growth - 36%tile, by [place] - 7lbs    -GDMA2 - POC BG today.  Check BG QID, if elevated in active labor >110 will plan to start insulin drip.  FBG in PP period.     - PNC: Rh pos, Rubella non-immune - will need MMR in PP period, GCT failed/GTT failed - GDMA2, GBS  negative, placenta anterior.     Staffed with Dr. Raphael.     Ewelina Moy MD MPH  OB/GYN, PGY2  Pager: [phone]  5/12/2017  10:47 AM            Patient seen and examined by me, agree with above resident note. Here for scheduled IOL for GDMA 2 on glyburide. FHT cat 1.  Cervix unfavorable, will begin po miso for ripening.  Took glyburide this am, will do QID accuchecks until active labor, then per protocol.    MELIDA RAPHAEL MD

## 2017-05-12 NOTE — PROGRESS NOTES
Brief Labor Progress Note    S: Doing well.  Feeling some minor contractions.        O:  Vitals:    17 1312 17 1400 17 1531 17 1618   BP: 112/59 102/59 111/59 118/68   Resp:    16   Temp:       TempSrc:             Cervix: FT/80/-2  Membranes: intact    FHR Baseline: 140s  Variability: Moderate  Accels: present  Decels: absent  [place]: contractions 3-5/10min    A/P:  Liam Ellis is a 37 year old  at 39w0d here for IOL for GDMA2.     Labor:  - Continue with PO miso, if does dilate, consider cook catheter placement  - Next SVE with fetal or maternal indication  - Anticipate     FWB:  - Cat 1, reactive  - Continuous fetal monitoring      Ewelina Moy MD MPH  OB-GYN PGY-2  17  5:32 PM

## 2017-05-12 NOTE — IP AVS SNAPSHOT
MRN:[id number]                      After Visit Summary   2017    Liam Ellis    MRN: [id number]           Thank you!     Thank you for choosing [facility] for your care. Our goal is always to provide you with excellent care. Hearing back from our patients is one way we can continue to improve our services. Please take a few minutes to complete the written survey that you may receive in the mail after you visit with us. Thank you!        Patient Information     Date Of Birth          1980        Designated Caregiver       Most Recent Value    Caregiver    Will someone help with your care after discharge? no      About your hospital stay     You were admitted on:  May 12, 2017 You last received care in the:  [facility]    You were discharged on:  May 17, 2017       Who to Call     For medical emergencies, please call 911.  For non-urgent questions about your medical care, please call your primary care provider or clinic, [phone]  For questions related to your surgery, please call your surgery clinic        Attending Provider     Provider Specialty    Tracy Bowser MD OB/Gyn    Shahida Raphael MD OB/Gyn       Primary Care Provider Office Phone # Fax #    Ana Rosa ELLEN Clarke [phone]       [facility] [address]  [place] [address]        After Care Instructions     Activity       Review discharge instructions            Diet       Resume previous diet            Discharge Instructions - Gestational diabetic patients       Gestational diabetic patients to follow up for fasting blood sugar and 2 hour 75gm glucose load at 6 weeks postpartum.                  Further instructions from your care team       Postop  Birth Instructions    Activity       Do not lift more than 10 pounds for 6 weeks after surgery.  Ask family and friends for help when you need it.    No driving until you have stopped taking your pain medications (usually two weeks  after surgery).    No heavy exercise or activity for 6 weeks.  Don't do anything that will put a strain on your surgery site.    Don't strain when using the toilet.  Your care team may prescribe a stool softener if you have problems with your bowel movements.     To care for your incision:       Keep the incision clean and dry.    Do not soak your incision in water. No swimming or hot tubs until it has fully healed. You may soak in the bathtub if the water level is below your incision.    Do not use peroxide, gel, cream, lotion, or ointment on your incision.    Adjust your clothes to avoid pressure on your surgery site (check the elastic in your underwear for example).     You may see a small amount of clear or pink drainage and this is normal.  Check with your health care provider:       If the drainage increases or has an odor.    If the incision reddens, you have swelling, or develop a rash.    If you have increased pain and the medicine we prescribed doesn't help.    If you have a fever above 100.4 F (38 C) with or without chills when placing thermometer under your tongue.   The area around your incision (surgery wound), will feel numb.  This is normal. The numbness should go away in less than a year.     Keep your hands clean:  Always wash your hands before touching your incision (surgery wound). This helps reduce your risk of infection. If your hands aren't dirty, you may use an alcohol hand-rub to clean your hands. Keep your nails clean and short.    Call your healthcare provider if you have any of these symptoms:       You soak a sanitary pad with blood within 1 hour, or you see blood clots larger than a golf ball.    Bleeding that lasts more than 6 weeks.    Vaginal discharge that smells bad.    Severe pain, cramping or tenderness in your lower belly area.    A need to urinate more frequently (use the toilet more often), more urgently (use the toilet very quickly), or it burns when you urinate.    Nausea  and vomiting.    Redness, swelling or pain around a vein in your leg.    Problems breastfeeding or a red or painful area on your breast.    Chest pain and cough or are gasping for air.    Problems with coping with sadness, anxiety or depression. If you have concerns about hurting yourself or the baby, call your provider immediately.      You have questions or concerns after you return home.                  Pending Results     Date and Time Order Name Status Description    5/14/2017 1110 Placenta path order and indications In process             Statement of Approval     Ordered          05/17/17 0846  I have reviewed and agree with all the recommendations and orders detailed in this document.  EFFECTIVE NOW     Approved and electronically signed by:  Tracy Bowser MD             Admission Information     Date & Time Provider Department Dept. Phone    5/12/2017 Shahida Raphael MD [facility] [phone]      Your Vitals Were     Blood Pressure Pulse Temperature Respirations Last Period Pulse Oximetry    113/76 77 97.7  F (36.5  C) (Oral) 16 08/12/2016 96%      MyChart Information     [email or web address] gives you secure access to your electronic health record. If you see a primary care provider, you can also send messages to your care team and make appointments. If you have questions, please call your primary care clinic.  If you do not have a primary care provider, please call [phone] and they will assist you.        Care EveryWhere ID     This is your Care EveryWhere ID. This could be used by other organizations to access your [facility] medical records  [id number]           Review of your medicines      START taking        Dose / Directions    acetaminophen 325 MG tablet   Commonly known as:  TYLENOL        Dose:  650 mg   Take 2 tablets (650 mg) by mouth every 4 hours as needed for other (surgical pain)   Quantity:  60 tablet   Refills:  0       ferrous sulfate 325 (65 FE) MG tablet   Commonly known as:  IRON   Used  for:  Acute blood loss as cause of postoperative anemia        Dose:  325 mg   Take 1 tablet (325 mg) by mouth daily (with breakfast)   Quantity:  60 tablet   Refills:  2       ibuprofen 600 MG tablet   Commonly known as:  ADVIL/MOTRIN        Dose:  600 mg   Take 1 tablet (600 mg) by mouth every 6 hours as needed for other (cramping)   Quantity:  60 tablet   Refills:  0       oxyCODONE 5 MG IR tablet   Commonly known as:  ROXICODONE        Dose:  5 mg   Take 1 tablet (5 mg) by mouth every 3 hours as needed for moderate to severe pain   Quantity:  15 tablet   Refills:  0       senna-docusate 8.6-50 MG per tablet   Commonly known as:  SENOKOT-S;PERICOLACE        Dose:  1-2 tablet   Take 1-2 tablets by mouth 2 times daily   Quantity:  100 tablet   Refills:  0         CONTINUE these medicines which have NOT CHANGED        Dose / Directions    prenatal multivitamin  plus iron 27-0.8 MG Tabs per tablet        Dose:  1 tablet   Take 1 tablet by mouth daily   Refills:  0         STOP taking     acetone (Urine) test Strp           blood glucose monitoring lancets           blood glucose monitoring test strip   Commonly known as:  ONE TOUCH VERIO IQ           glyBURIDE 2.5 MG tablet   Commonly known as:  DIABETA /MICRONASE                Where to get your medicines      These medications were sent to [facility] [place], [place] - [address]  [address] [address]     Phone:  [phone]     acetaminophen 325 MG tablet    ferrous sulfate 325 (65 FE) MG tablet    ibuprofen 600 MG tablet    senna-docusate 8.6-50 MG per tablet         Some of these will need a paper prescription and others can be bought over the counter. Ask your nurse if you have questions.     Bring a paper prescription for each of these medications     oxyCODONE 5 MG IR tablet                Protect others around you: Learn how to safely use, store and throw away your medicines at www.disposemymeds.org.              Medication List: This is a list of all your medications and when to take them. Check marks below indicate your daily home schedule. Keep this list as a reference.      Medications           Morning Afternoon Evening Bedtime As Needed    acetaminophen 325 MG tablet   Commonly known as:  TYLENOL   Take 2 tablets (650 mg) by mouth every 4 hours as needed for other (surgical pain)   Last time this was given:  650 mg on 5/17/2017  7:36 AM                                ferrous sulfate 325 (65 FE) MG tablet   Commonly known as:  IRON   Take 1 tablet (325 mg) by mouth daily (with breakfast)                                ibuprofen 600 MG tablet   Commonly known as:  ADVIL/MOTRIN   Take 1 tablet (600 mg) by mouth every 6 hours as needed for other (cramping)   Last time this was given:  800 mg on 5/17/2017  7:36 AM                                oxyCODONE 5 MG IR tablet   Commonly known as:  ROXICODONE   Take 1 tablet (5 mg) by mouth every 3 hours as needed for moderate to severe pain   Last time this was given:  10 mg on 5/16/2017 10:07 PM                                prenatal multivitamin  plus iron 27-0.8 MG Tabs per tablet   Take 1 tablet by mouth daily                                senna-docusate 8.6-50 MG per tablet   Commonly known as:  SENOKOT-S;PERICOLACE   Take 1-2 tablets by mouth 2 times daily   Last time this was given:  2 tablets on 5/17/2017  7:36 AM

## 2017-05-13 ENCOUNTER — ANESTHESIA (OUTPATIENT)
Dept: OBGYN | Facility: CLINIC | Age: 37
End: 2017-05-13
Payer: COMMERCIAL

## 2017-05-13 ENCOUNTER — ANESTHESIA EVENT (OUTPATIENT)
Dept: OBGYN | Facility: CLINIC | Age: 37
End: 2017-05-13
Payer: COMMERCIAL

## 2017-05-13 LAB
GLUCOSE BLDC GLUCOMTR-MCNC: 103 MG/DL (ref 70–99)
GLUCOSE BLDC GLUCOMTR-MCNC: 106 MG/DL (ref 70–99)
GLUCOSE BLDC GLUCOMTR-MCNC: 108 MG/DL (ref 70–99)
GLUCOSE BLDC GLUCOMTR-MCNC: 109 MG/DL (ref 70–99)
GLUCOSE BLDC GLUCOMTR-MCNC: 110 MG/DL (ref 70–99)
GLUCOSE BLDC GLUCOMTR-MCNC: 113 MG/DL (ref 70–99)
GLUCOSE BLDC GLUCOMTR-MCNC: 121 MG/DL (ref 70–99)
GLUCOSE BLDC GLUCOMTR-MCNC: 135 MG/DL (ref 70–99)
GLUCOSE BLDC GLUCOMTR-MCNC: 147 MG/DL (ref 70–99)
GLUCOSE BLDC GLUCOMTR-MCNC: 159 MG/DL (ref 70–99)
GLUCOSE BLDC GLUCOMTR-MCNC: 165 MG/DL (ref 70–99)
GLUCOSE BLDC GLUCOMTR-MCNC: 169 MG/DL (ref 70–99)
GLUCOSE BLDC GLUCOMTR-MCNC: 184 MG/DL (ref 70–99)
GLUCOSE BLDC GLUCOMTR-MCNC: 63 MG/DL (ref 70–99)
GLUCOSE BLDC GLUCOMTR-MCNC: 68 MG/DL (ref 70–99)
GLUCOSE BLDC GLUCOMTR-MCNC: 84 MG/DL (ref 70–99)

## 2017-05-13 PROCEDURE — 25000132 ZZH RX MED GY IP 250 OP 250 PS 637: Performed by: OBSTETRICS & GYNECOLOGY

## 2017-05-13 PROCEDURE — 25000125 ZZHC RX 250: Performed by: OBSTETRICS & GYNECOLOGY

## 2017-05-13 PROCEDURE — 25800025 ZZH RX 258: Performed by: OBSTETRICS & GYNECOLOGY

## 2017-05-13 PROCEDURE — 12000030 ZZH R&B OB INTERMEDIATE UMMC

## 2017-05-13 PROCEDURE — 25800025 ZZH RX 258: Performed by: ANESTHESIOLOGY

## 2017-05-13 PROCEDURE — S0020 INJECTION, BUPIVICAINE HYDRO: HCPCS | Performed by: ANESTHESIOLOGY

## 2017-05-13 PROCEDURE — 25000125 ZZHC RX 250: Performed by: ANESTHESIOLOGY

## 2017-05-13 PROCEDURE — P9041 ALBUMIN (HUMAN),5%, 50ML: HCPCS | Performed by: NURSE ANESTHETIST, CERTIFIED REGISTERED

## 2017-05-13 PROCEDURE — 25000128 H RX IP 250 OP 636: Performed by: NURSE ANESTHETIST, CERTIFIED REGISTERED

## 2017-05-13 PROCEDURE — 40000671 ZZH STATISTIC ANESTHESIA CASE

## 2017-05-13 PROCEDURE — 00000146 ZZHCL STATISTIC GLUCOSE BY METER IP

## 2017-05-13 RX ORDER — EPHEDRINE SULFATE 50 MG/ML
5 INJECTION, SOLUTION INTRAMUSCULAR; INTRAVENOUS; SUBCUTANEOUS
Status: DISCONTINUED | OUTPATIENT
Start: 2017-05-13 | End: 2017-05-14

## 2017-05-13 RX ORDER — NALBUPHINE HYDROCHLORIDE 10 MG/ML
2.5-5 INJECTION, SOLUTION INTRAMUSCULAR; INTRAVENOUS; SUBCUTANEOUS EVERY 6 HOURS PRN
Status: DISCONTINUED | OUTPATIENT
Start: 2017-05-13 | End: 2017-05-14

## 2017-05-13 RX ORDER — DEXTROSE MONOHYDRATE 25 G/50ML
25-50 INJECTION, SOLUTION INTRAVENOUS
Status: DISCONTINUED | OUTPATIENT
Start: 2017-05-13 | End: 2017-05-14

## 2017-05-13 RX ORDER — OXYTOCIN/0.9 % SODIUM CHLORIDE 30/500 ML
PLASTIC BAG, INJECTION (ML) INTRAVENOUS
Status: DISCONTINUED
Start: 2017-05-13 | End: 2017-05-14 | Stop reason: HOSPADM

## 2017-05-13 RX ORDER — LIDOCAINE HYDROCHLORIDE 10 MG/ML
INJECTION, SOLUTION EPIDURAL; INFILTRATION; INTRACAUDAL; PERINEURAL
Status: DISCONTINUED
Start: 2017-05-13 | End: 2017-05-14 | Stop reason: WASHOUT

## 2017-05-13 RX ORDER — OXYTOCIN/0.9 % SODIUM CHLORIDE 30/500 ML
1-24 PLASTIC BAG, INJECTION (ML) INTRAVENOUS CONTINUOUS
Status: DISCONTINUED | OUTPATIENT
Start: 2017-05-13 | End: 2017-05-14

## 2017-05-13 RX ORDER — DEXTROSE, SODIUM CHLORIDE, SODIUM LACTATE, POTASSIUM CHLORIDE, AND CALCIUM CHLORIDE 5; .6; .31; .03; .02 G/100ML; G/100ML; G/100ML; G/100ML; G/100ML
INJECTION, SOLUTION INTRAVENOUS CONTINUOUS
Status: DISCONTINUED | OUTPATIENT
Start: 2017-05-13 | End: 2017-05-14

## 2017-05-13 RX ORDER — OXYTOCIN 10 [USP'U]/ML
INJECTION, SOLUTION INTRAMUSCULAR; INTRAVENOUS
Status: DISCONTINUED
Start: 2017-05-13 | End: 2017-05-14 | Stop reason: HOSPADM

## 2017-05-13 RX ORDER — NALOXONE HYDROCHLORIDE 0.4 MG/ML
.1-.4 INJECTION, SOLUTION INTRAMUSCULAR; INTRAVENOUS; SUBCUTANEOUS
Status: DISCONTINUED | OUTPATIENT
Start: 2017-05-13 | End: 2017-05-14

## 2017-05-13 RX ORDER — NICOTINE POLACRILEX 4 MG
15-30 LOZENGE BUCCAL
Status: DISCONTINUED | OUTPATIENT
Start: 2017-05-13 | End: 2017-05-14

## 2017-05-13 RX ORDER — TERBUTALINE SULFATE 1 MG/ML
0.25 INJECTION, SOLUTION SUBCUTANEOUS
Status: DISCONTINUED | OUTPATIENT
Start: 2017-05-13 | End: 2017-05-14

## 2017-05-13 RX ORDER — LIDOCAINE 40 MG/G
CREAM TOPICAL
Status: DISCONTINUED | OUTPATIENT
Start: 2017-05-13 | End: 2017-05-13

## 2017-05-13 RX ORDER — LIDOCAINE 40 MG/G
CREAM TOPICAL
Status: DISCONTINUED | OUTPATIENT
Start: 2017-05-13 | End: 2017-05-14

## 2017-05-13 RX ORDER — SODIUM CHLORIDE, SODIUM LACTATE, POTASSIUM CHLORIDE, CALCIUM CHLORIDE 600; 310; 30; 20 MG/100ML; MG/100ML; MG/100ML; MG/100ML
INJECTION, SOLUTION INTRAVENOUS CONTINUOUS
Status: DISCONTINUED | OUTPATIENT
Start: 2017-05-13 | End: 2017-05-14

## 2017-05-13 RX ORDER — SODIUM CHLORIDE 9 MG/ML
INJECTION, SOLUTION INTRAVENOUS CONTINUOUS
Status: DISCONTINUED | OUTPATIENT
Start: 2017-05-13 | End: 2017-05-14

## 2017-05-13 RX ORDER — LIDOCAINE HYDROCHLORIDE AND EPINEPHRINE 15; 5 MG/ML; UG/ML
INJECTION, SOLUTION EPIDURAL PRN
Status: DISCONTINUED | OUTPATIENT
Start: 2017-05-13 | End: 2017-05-13

## 2017-05-13 RX ADMIN — SODIUM CHLORIDE, SODIUM LACTATE, POTASSIUM CHLORIDE, CALCIUM CHLORIDE AND DEXTROSE MONOHYDRATE: 5; 600; 310; 30; 20 INJECTION, SOLUTION INTRAVENOUS at 10:40

## 2017-05-13 RX ADMIN — Medication: at 03:34

## 2017-05-13 RX ADMIN — DEXTROSE 15 G: 15 GEL ORAL at 19:23

## 2017-05-13 RX ADMIN — Medication 5 ML: at 07:55

## 2017-05-13 RX ADMIN — ALBUMIN (HUMAN): 12.5 SOLUTION INTRAVENOUS at 08:32

## 2017-05-13 RX ADMIN — HUMAN INSULIN: 100 INJECTION, SOLUTION SUBCUTANEOUS at 11:45

## 2017-05-13 RX ADMIN — OXYTOCIN-SODIUM CHLORIDE 0.9% IV SOLN 30 UNIT/500ML 1 MILLI-UNITS/MIN: 30-0.9/5 SOLUTION at 09:57

## 2017-05-13 RX ADMIN — Medication 5 ML: at 08:34

## 2017-05-13 RX ADMIN — Medication 5 ML: at 23:16

## 2017-05-13 RX ADMIN — LIDOCAINE HYDROCHLORIDE,EPINEPHRINE BITARTRATE 3 ML: 15; .005 INJECTION, SOLUTION EPIDURAL; INFILTRATION; INTRACAUDAL; PERINEURAL at 03:30

## 2017-05-13 RX ADMIN — SODIUM CHLORIDE, POTASSIUM CHLORIDE, SODIUM LACTATE AND CALCIUM CHLORIDE 1000 ML: 600; 310; 30; 20 INJECTION, SOLUTION INTRAVENOUS at 02:29

## 2017-05-13 RX ADMIN — SODIUM CHLORIDE, POTASSIUM CHLORIDE, SODIUM LACTATE AND CALCIUM CHLORIDE 370 ML: 600; 310; 30; 20 INJECTION, SOLUTION INTRAVENOUS at 17:36

## 2017-05-13 RX ADMIN — Medication 5 ML: at 23:20

## 2017-05-13 RX ADMIN — HUMAN INSULIN 1.5 UNITS/HR: 100 INJECTION, SOLUTION SUBCUTANEOUS at 10:42

## 2017-05-13 RX ADMIN — SODIUM CHLORIDE, POTASSIUM CHLORIDE, SODIUM LACTATE AND CALCIUM CHLORIDE: 600; 310; 30; 20 INJECTION, SOLUTION INTRAVENOUS at 11:54

## 2017-05-13 RX ADMIN — Medication 5 ML: at 03:31

## 2017-05-13 RX ADMIN — LIDOCAINE HYDROCHLORIDE,EPINEPHRINE BITARTRATE 3 ML: 15; .005 INJECTION, SOLUTION EPIDURAL; INFILTRATION; INTRACAUDAL; PERINEURAL at 08:31

## 2017-05-13 RX ADMIN — Medication 5 ML: at 08:40

## 2017-05-13 RX ADMIN — SODIUM CHLORIDE, POTASSIUM CHLORIDE, SODIUM LACTATE AND CALCIUM CHLORIDE: 600; 310; 30; 20 INJECTION, SOLUTION INTRAVENOUS at 03:53

## 2017-05-13 RX ADMIN — Medication: at 16:20

## 2017-05-13 RX ADMIN — SODIUM CHLORIDE, POTASSIUM CHLORIDE, SODIUM LACTATE AND CALCIUM CHLORIDE: 600; 310; 30; 20 INJECTION, SOLUTION INTRAVENOUS at 18:08

## 2017-05-13 RX ADMIN — Medication 10 ML/HR: at 03:31

## 2017-05-13 NOTE — ANESTHESIA PROCEDURE NOTES
Epidural Procedure Note    Staff:     Anesthesiologist:  GIDEON HERNADEZ  Location: OB     Procedure start time:  5/13/2017 8:22 AM     Procedure end time:  5/13/2017 8:41 AM   Pre-procedure checklist:   patient identified, IV checked, site marked, risks and benefits discussed, informed consent, monitors and equipment checked, pre-op evaluation, at physician/surgeon's request and post-op pain management      Correct Patient: Yes      Correct Position: Yes      Correct Site: Yes      Correct Procedure: Yes      Correct Laterality:  Yes    Site Marked:  Yes  Procedure:     Procedure:  Epidural catheter    ASA:  2    Position:  Sitting    Sterile Prep: chloraprep, mask, sterile gloves and patient draped      Insertion site:  L3-4    Local skin infiltration:  1% lidocaine    amount (mL):  1    Approach:  Midline    Needle gauge (G):  17    Needle Length (in):  3.5    Block Needle Type:  Locouhmarley    Injection Technique:  LORT saline    ARLETH at (cm):  5.5    Attempts:  1    Redirects:  1    Catheter gauge (G):  19    Catheter threaded easily: Yes      Threaded to cm at skin:  10.5    Threaded in epidural space (cm):  5    Paresthesias:  No    Aspiration negative for Heme or CSF: Yes      Test dose (mL):  3     Local anesthetic:  Lidocaine 1.5% w/ 1:200,000 epinephrine    Test dose time:  08:31    Test dose negative for signs of intravascular, subdural or intrathecal injection: Yes    Assessment/Narrative:      Called to evaluate epidural in patient who remained uncomfortable. No apparent relief after epidural placement at 330am. Had pain on the L > R, therefore catheter pulled back 1cm and bolused. No relief after 15 minutes from this maneuver, therefore epidural replaced. Used dural puncture epidural technique to confirm correct placement given previous epidural failure. +CSF. No intrathecal medication given.

## 2017-05-13 NOTE — ANESTHESIA PROCEDURE NOTES
Epidural Procedure Note    Staff:     Anesthesiologist:  BOUCHRA HERRERA    Resident/CRNA:  SERGE MANCIA    Procedure performed by resident/CRNA in the presence of a teaching physician    Location: OB     Procedure start time:  5/13/2017 3:03 AM     Procedure end time:  5/13/2017 3:32 AM   Pre-procedure checklist:   patient identified, IV checked, site marked, risks and benefits discussed, informed consent, monitors and equipment checked, pre-op evaluation, at physician/surgeon's request and post-op pain management      Correct Patient: Yes      Correct Position: Yes      Correct Site: Yes      Correct Procedure: Yes      Correct Laterality:  Yes    Site Marked:  Yes  Procedure:     Procedure:  Epidural catheter    Position:  Sitting    Sterile Prep: chloraprep, mask, sterile gloves and patient draped      Insertion site:  L3-4    Local skin infiltration:  1% lidocaine    Approach:  Midline    Needle gauge (G):  17    Needle Length (in):  3.5    Block Needle Type:  Touhy    Injection Technique:  LORT saline    ARLETH at (cm):  5    Attempts:  1    Redirects:  0    Catheter gauge (G):  19    Catheter threaded easily: Yes      Threaded to cm at skin:  10    Paresthesias:  No    Aspiration negative for Heme or CSF: Yes      Test dose (mL):  3     Local anesthetic:  Lidocaine 1.5% w/ 1:200,000 epinephrine    Test dose time:  03:30    Test dose negative for signs of intravascular, subdural or intrathecal injection: Yes

## 2017-05-13 NOTE — PROVIDER NOTIFICATION
05/13/17 1116   Provider Notification   Provider Name/Title Dr العلي   Method of Notification Phone   pt reported feeling left lower abdominal pain.  She reports it as sharp.  She is able to rest/nap.  Repositioned. pcea used. Bladder emptied. Dr. العلي updated.  Pt feels she it is tolerable now but is anxious about it becoming more intense.  Pt is able to rest.

## 2017-05-13 NOTE — PROGRESS NOTES
[facility]  Labor Progress Note    S: Patient more comfortable with epidural although still having some pain on her left side.    O:   Patient Vitals for the past 4 hrs:   BP Temp Temp src Resp SpO2   17 0630 117/79 - - - 91 %   17 0600 109/60 - - - 92 %   17 0530 118/71 - - 16 93 %   17 0500 119/70 - - 16 91 %   17 0430 120/72 - - 16 95 %   17 0410 107/60 - - - 93 %   17 0400 118/75 - - - 94 %   17 0350 119/76 - - - 95 %   17 0345 118/73 - - - 95 %   17 0341 110/68 - - - -   17 0339 113/67 - - - 96 %   17 0337 128/77 - - - -   17 0335 107/65 97.7  F (36.5  C) Oral 18 95 %     SVE: 3-4/90/+1, AROM of forebag with blood tinged fluid    FHT: Baseline 125, mod variability, + accelerations, no decelerations  [place]: 3 contractions in 10 minutes    A/P: Liam Ellis is a 37 year old  at 39w1d, admitted for IOL for GDMA2.    IOL:  - S/p miso x 5 for cervical ripening, SROM last night but AROM of forebag this morning. 3-4 cm dilated, start pitocin if contractions space.  - GBS neg, no abx indicated  - Continue toco, anticipate     Pain management:  - Comfortable with epidural, offered repositioning and bolus button     GDMA2:  - On glyburide 2.5 daily  - BG QID for now, q1 hour in active labor  - Insulin gtt in active labor if needed     FWB:  - Category 1 FHT, reactive  - Continue EFM    Heather Benjamin MD  Ob/Gyn, PGY-3  2017, 6:50 AM

## 2017-05-13 NOTE — PROGRESS NOTES
Brief Labor Progress Note    S:  She is doing well, her pain is well controlled with epidural which is in place.    O:  Vitals:    17 1440 17 1500 17 1545 17 1700   BP:  125/77 106/63 107/68   Pulse:       Resp:  16     Temp:  97.8  F (36.6  C)     TempSrc:  Oral     SpO2: 97% 97% 97% 98%     Gen: resting in bed  Cervix: 5/100/0  Membranes: SROM    FHR Baseline: 180  Variability: Minimal  Accels: Absent  Decels: recurrent late decels  [place]: contractions 3 per 10 min, irregular pattern    A/P:  Liam Ellis is a 37 year old  at 39w1d here for IOL for GDMA2 on 2.5mg glyburide.  IUPC placed and pitocin stopped for pit break.    Labor:  - Holding Pitocin; restart if tracing improves  - IUPC in place  - Next SVE with fetal or maternal indication  - Anticipate   - Pain management: Epidural in place    FWB:  - Cat II, Non-reactive.  Resuscitative measures underway.  - Continuous fetal monitoring    PNC:  - Rh Pos, No rhogam indicated  - GBS negative  - Rubella non-immune needs MMR in PP period, GCT/GTT failed-GDMA2 on glyburide; start insulin drip  - Placenta anterior per US 17    PPH:  - Moderate, type and screen      Ange Jimenez MD  OB/GYN Resident, PGY3  17 10:37 AM

## 2017-05-13 NOTE — PROGRESS NOTES
S; getting more uncomfortable with ctx.  Had gush of fluid when getting up to bathroom, nothing since.    O /74  Temp 98.2  F (36.8  C) (Oral)  Resp 16  LMP 08/12/2016    FHT: 135, +accels, -decels, mod gera  [place]: Q2-3min  SVE: 0/0-3 per resident exam.  Small wet spot on pad, possible mec? Per RN    A/P: IUP at 39w0d, IOL for gdma2, possible SROM   FWBR, cat 1 tracing   Cont expt mgmt, monitor for more LOF    MELIDA ANDINO MD

## 2017-05-13 NOTE — ANESTHESIA PREPROCEDURE EVALUATION
Anesthesia Evaluation       history and physical reviewed .      No history of anesthetic complications          ROS/MED HX    ENT/Pulmonary:  - neg pulmonary ROS     Neurologic:  - neg neurologic ROS     Cardiovascular:  - neg cardiovascular ROS       METS/Exercise Tolerance:     Hematologic:         Musculoskeletal:         GI/Hepatic:         Renal/Genitourinary:         Endo:         Psychiatric:         Infectious Disease:         Malignancy:         Other:                            (+) gestational diabetes               Anesthesia Plan      History & Physical Review  History and physical reviewed and following examination; no interval change.    ASA Status:  3 .  and emergent       Plan for Epidural and MAC   PONV prophylaxis:  Ondansetron (or other 5HT-3)  Plan for urgent C section due to prolonged labor and failure to progress, insitu labor epidural used for C section with GA and ETT as back up.       Postoperative Care  Postoperative pain management:  IV analgesics, Oral pain medications, Multi-modal analgesia and Neuraxial analgesia.      Consents  Anesthetic plan, risks, benefits and alternatives discussed with:  Patient and Patient..

## 2017-05-13 NOTE — PLAN OF CARE
Problem: Labor (Cervical Ripen, Induct, Augment) (Adult,Obstetrics,Pediatric)  Goal: Signs and Symptoms of Listed Potential Problems Will be Absent or Manageable (Labor)  Signs and symptoms of listed potential problems will be absent or manageable by discharge/transition of care (reference Labor (Cervical Ripen, Induct, Augment) (Adult,Obstetrics,Pediatric) CPG).   Outcome: No Change  Received bedside report from jadiel peoples.  Pt is uncomfortable.  Epidural placed at 03.  Per pt, no relief.  Most discomfort during ctxs is on left side.  Pt is left lateral. Reinforced pcea use.   MDA notified and rebolused.  Pt remained uncomfortable.  Dr Del Cid notified and new epidural placed.  Pt is now comfortable and reports no pain.  Pt will rest.  Family at bedside.  Discussed pt is not to ambulate.  Falls band placed.  Per sister, she helped her sister to the toliet last night after epidural.  Family and pt state understanding of bedrest only.  Pt was straight cath'd at 0740.  Will offer bedpan .  Dr. Soto and Dr. Jimenez updated that VOA=118.  Requested diet order change and clarification on BG checks.  Informed pt and family of clear liquid only diet after epidural.  Clear liq meal ordered.  Pulse 110. Afrebile.  Dr. soto updated when at bedside.  Clear/light mec/show seen.  Perineal care completed prn.  Pt is now able to rest.  Family at bedside.  Pt is agreeable with starting oxytocin.  Call light is within reach.  Pt and family deny the need for an .

## 2017-05-13 NOTE — PLAN OF CARE
Problem: Labor (Cervical Ripen, Induct, Augment) (Adult,Obstetrics,Pediatric)  Goal: Signs and Symptoms of Listed Potential Problems Will be Absent or Manageable (Labor)  Signs and symptoms of listed potential problems will be absent or manageable by discharge/transition of care (reference Labor (Cervical Ripen, Induct, Augment) (Adult,Obstetrics,Pediatric) CPG).   Outcome: Therapy, progress toward functional goals as expected  Labor Shift Note  Data: Contraction frequency q 2-4 minutes and Strength moderate, palpate moderate. Fetal assessment category one.   Vitals:     17 1618 17 1743 17 1837 17 2129   BP: 118/68 101/58 108/74 103/65   Resp: 16 16 16 18   Temp:   98.2  F (36.8  C)   98  F (36.7  C)   TempSrc:   Oral   Oral   .  .  Leaking scant  amounts of brown fluid.  Signs and symptoms of infection absent and include .  Blood pressures wnl.  Support person Hemant present.  Interventions: Continue uterine/fetal assessment continuous. Vital Signs per order set. Comfort measures include position changes, breathing and relaxation.  Medicated for declined at this time.  Plan: Anticipate . Provide labor/coping assistance as needed by patient and support person.  Observe for and notify care provider of indications of progressing labor, need for pain medications,  or signs of fetal/maternal compromise.

## 2017-05-13 NOTE — PROGRESS NOTES
[facility]  Labor Progress Note    S: Patient feeling a lot more uncomfortable with contractions.    O:   Patient Vitals for the past 4 hrs:   BP Temp Temp src Resp   17 2129 103/65 98  F (36.7  C) Oral 18     SVE: /0    FHT: Baseline 140, mod variability, + accelerations, no decelerations  [place]: 3 contractions in 10 minutes    A/P: Liam Ellis is a 37 year old  at 39w0d by LMP c/w 11w4d US admitted for IOL for GDMA2.    IOL:  - S/p miso x 4 for cervical ripening. S/p SROM of mec stained fluid this evening. Initially angella too frequently for another misoprostol but now ctx have spaced. Will give more miso for cervical ripening and then start pitocin once cervix favorable.  - GBS neg, no abx indicated  - Continue toco, anticipate     GDMA2:  - On glyburide 2.5 daily  - BG QID for now, q1 hour in active labor  - Insulin gtt in active labor if needed    FWB:  - Category 1 FHT, reactive  - Continue EFM    Heather Benjamin MD  Ob/Gyn, PGY-3  2017, 11:10 PM

## 2017-05-13 NOTE — PROGRESS NOTES
"Summary:  Pt was comfortable until around noon when she reported left lower abdomen and upper hip pain.  Dr. Del Cid updated and in room to assess.  See his note.  Pt encouraged to remain in left lateral or tilt with HOB elevated to try to increase comfort.  Pt was able to sleep on/off.  When awakened, pt reports feeling discomfort.  Dr. Del Cid updated.  Dr. Bowser updated on complete labor/med/pain status.  SVE at 1400 per Dr. Bowser' request.  Plan to continue to monitor closely.  Oxytocin remains at 5 mu as ctxs q 2-3\".   mod with accels and occassional variable.  Afrebile.  Insulin gtt adjusted based on hourly BGs.  Pt is using her PCEA.  Call light is within reach.  Her boyfriend is present at bedside.    "

## 2017-05-13 NOTE — PROVIDER NOTIFICATION
05/13/17 0945   Provider Notification   Provider Name/Title Dr Jimenez   Method of Notification Electronic Page   pt had a med bottle on her clear liquid tray.  When asked, pt said she took her glyubride 2.5 mg po with her clear liquid breakfast about 0940.  Instructed family/pt not to take any meds from home.  Call light is in place. reinstructed to ask if any questions.  Dr. Jimenez updated.  Insulin not started at this time pending.

## 2017-05-13 NOTE — PROGRESS NOTES
Regular ctx, Pitocin continued.  FHTs with moderate variability, +accels, -decels, baseline 130s.  Exam per RN @ 1400 shows cervix 5/90/0.  Anesthesiologist has been assessing and treating labor pain.  Will continue Pitocin, anticipate .

## 2017-05-13 NOTE — PROGRESS NOTES
S; uncomfortable, desires epidural    O: /65  Temp 98  F (36.7  C) (Oral)  Resp 18  LMP 08/12/2016    FHT: 125, +accels, -decels, mod gera  [place]: Q2-5min  SVE: 2.5/80/-1    A/P: IUP at 39w1d, IOL for GDMA2   FWBR, cat1 tracing   Ok for epidural now.  Will monitor and begin pit IOL if ctx space or no cervical change    MELIDA ANDINO MD

## 2017-05-13 NOTE — PROGRESS NOTES
Poss srom  Ft/80/-2  Ctx frequent, more uncomfortable  [facility]  Labor Progress Note    S: Patient doing well, ate some dinner. Had a gush of brownish fluid when she was up to the bathroom.    O:   Patient Vitals for the past 4 hrs:   BP Temp Temp src Resp   17 1837 108/74 - - 16   17 1743 101/58 98.2  F (36.8  C) Oral 16     SVE: Fingertip/80/-2    FHT: Baseline 145, mod variability, + accelerations, no decelerations  [place]: 4 contractions in 10 minutes    A/P: Liam Ellis is a 37 year old  at 39w0d, admitted for IOL for GDMA2.    IOL:  - S/p miso x 4 for cervical ripening. S/p SROM of mec stained fluid at 1900. Angella more painfully; will wait to redose miso as angella too frequently. Discussed cook catheter but will not place given SROM.  - GBS neg, no abx indicated  - Continue toco, anticipate      GDMA2:  - On glyburide 2.5 daily  - BG QID for now, q1 hour in active labor  - Insulin gtt in active labor if needed     FWB:  - Category 1 FHT, reactive  - Continue EFM    Heather Benjamin MD  Ob/Gyn, PGY-3  2017, 8:25 PM

## 2017-05-13 NOTE — PROGRESS NOTES
Brief Labor Progress Note  S: Patient is 37 year old  at 39w1d who is being induced for labor. She had spontaneous rupture of membranes last night. She is doing well, her pain is well controlled with epidural which is in place. She is expected to have a vaginal delivery.    O:  Vitals:    17 0900 17 0905 17 0910 17 0915   BP: 117/77 119/75 122/79 117/75   Pulse: 121 110     Resp: 18 18 16 16   Temp:       TempSrc:       SpO2: 97% 97% 96%        Cervix: 3  Membranes: SROM    FHR Baseline: 130  Variability: Moderate  Accels: Present  Decels: None  [place]: contractions 2 per 10 min    A/P:  Liam Ellis is a 37 year old  at 39w1d here for IOL for GDMA2 on 2.5mg glyburide. Vaginal delivery expected.     Labor:  - Begin Pitocin  - Next SVE with fetal or maternal indication  - Anticipate   - Pain management: Epidural in place    FWB:  - Cat I, Reactive  - Continuous fetal monitoring    PNC:  - Rh Pos, No rhogam indicated  - GBS negative  - Rubella non-immune needs MMR in PP period, GCT/GTT failed-GDMA2 on glyburide; start insulin drip  - Placenta anterior per US 17    PPH:  - Moderate, type and screen      Ange Jimenez MD  OB/GYN Resident, PGY3  17 10:37 AM

## 2017-05-14 ENCOUNTER — SURGERY (OUTPATIENT)
Age: 37
End: 2017-05-14

## 2017-05-14 PROBLEM — Z98.891 S/P CESAREAN SECTION: Status: ACTIVE | Noted: 2017-05-14

## 2017-05-14 LAB
ABO + RH BLD: NORMAL
ABO + RH BLD: NORMAL
BLD GP AB SCN SERPL QL: NORMAL
BLD PROD TYP BPU: NORMAL
BLOOD BANK CMNT PATIENT-IMP: NORMAL
GLUCOSE BLDC GLUCOMTR-MCNC: 100 MG/DL (ref 70–99)
GLUCOSE BLDC GLUCOMTR-MCNC: 105 MG/DL (ref 70–99)
GLUCOSE BLDC GLUCOMTR-MCNC: 110 MG/DL (ref 70–99)
GLUCOSE BLDC GLUCOMTR-MCNC: 112 MG/DL (ref 70–99)
GLUCOSE BLDC GLUCOMTR-MCNC: 115 MG/DL (ref 70–99)
GLUCOSE BLDC GLUCOMTR-MCNC: 118 MG/DL (ref 70–99)
GLUCOSE BLDC GLUCOMTR-MCNC: 120 MG/DL (ref 70–99)
GLUCOSE BLDC GLUCOMTR-MCNC: 144 MG/DL (ref 70–99)
NUM BPU REQUESTED: 2
SPECIMEN EXP DATE BLD: NORMAL

## 2017-05-14 PROCEDURE — 27210794 ZZH OR GENERAL SUPPLY STERILE: Performed by: OBSTETRICS & GYNECOLOGY

## 2017-05-14 PROCEDURE — 25800025 ZZH RX 258: Performed by: OBSTETRICS & GYNECOLOGY

## 2017-05-14 PROCEDURE — 88307 TISSUE EXAM BY PATHOLOGIST: CPT | Mod: 26 | Performed by: OBSTETRICS & GYNECOLOGY

## 2017-05-14 PROCEDURE — 25000125 ZZHC RX 250: Performed by: ANESTHESIOLOGY

## 2017-05-14 PROCEDURE — C1765 ADHESION BARRIER: HCPCS | Performed by: OBSTETRICS & GYNECOLOGY

## 2017-05-14 PROCEDURE — 40000170 ZZH STATISTIC PRE-PROCEDURE ASSESSMENT II: Performed by: OBSTETRICS & GYNECOLOGY

## 2017-05-14 PROCEDURE — 25000128 H RX IP 250 OP 636: Performed by: OBSTETRICS & GYNECOLOGY

## 2017-05-14 PROCEDURE — 59510 CESAREAN DELIVERY: CPT | Mod: GC | Performed by: OBSTETRICS & GYNECOLOGY

## 2017-05-14 PROCEDURE — 37000008 ZZH ANESTHESIA TECHNICAL FEE, 1ST 30 MIN: Performed by: OBSTETRICS & GYNECOLOGY

## 2017-05-14 PROCEDURE — 40000010 ZZH STATISTIC ANES STAT CODE-CRNA PER MINUTE: Performed by: OBSTETRICS & GYNECOLOGY

## 2017-05-14 PROCEDURE — 36000057 ZZH SURGERY LEVEL 3 1ST 30 MIN - UMMC: Performed by: OBSTETRICS & GYNECOLOGY

## 2017-05-14 PROCEDURE — 25000128 H RX IP 250 OP 636: Performed by: ANESTHESIOLOGY

## 2017-05-14 PROCEDURE — 37000009 ZZH ANESTHESIA TECHNICAL FEE, EACH ADDTL 15 MIN: Performed by: OBSTETRICS & GYNECOLOGY

## 2017-05-14 PROCEDURE — 27110028 ZZH OR GENERAL SUPPLY NON-STERILE: Performed by: OBSTETRICS & GYNECOLOGY

## 2017-05-14 PROCEDURE — 25000128 H RX IP 250 OP 636: Performed by: NURSE ANESTHETIST, CERTIFIED REGISTERED

## 2017-05-14 PROCEDURE — 25000125 ZZHC RX 250: Performed by: OBSTETRICS & GYNECOLOGY

## 2017-05-14 PROCEDURE — 36000059 ZZH SURGERY LEVEL 3 EA 15 ADDTL MIN UMMC: Performed by: OBSTETRICS & GYNECOLOGY

## 2017-05-14 PROCEDURE — 71000014 ZZH RECOVERY PHASE 1 LEVEL 2 FIRST HR: Performed by: OBSTETRICS & GYNECOLOGY

## 2017-05-14 PROCEDURE — C9290 INJ, BUPIVACAINE LIPOSOME: HCPCS | Performed by: ANESTHESIOLOGY

## 2017-05-14 PROCEDURE — 00000159 ZZHCL STATISTIC H-SEND OUTS PREP: Performed by: OBSTETRICS & GYNECOLOGY

## 2017-05-14 PROCEDURE — 25000132 ZZH RX MED GY IP 250 OP 250 PS 637

## 2017-05-14 PROCEDURE — 25000125 ZZHC RX 250: Performed by: NURSE ANESTHETIST, CERTIFIED REGISTERED

## 2017-05-14 PROCEDURE — 25000132 ZZH RX MED GY IP 250 OP 250 PS 637: Performed by: OBSTETRICS & GYNECOLOGY

## 2017-05-14 PROCEDURE — 59514 CESAREAN DELIVERY ONLY: CPT | Mod: 80 | Performed by: OBSTETRICS & GYNECOLOGY

## 2017-05-14 PROCEDURE — 12000030 ZZH R&B OB INTERMEDIATE UMMC

## 2017-05-14 PROCEDURE — 00000146 ZZHCL STATISTIC GLUCOSE BY METER IP

## 2017-05-14 PROCEDURE — 88307 TISSUE EXAM BY PATHOLOGIST: CPT | Performed by: OBSTETRICS & GYNECOLOGY

## 2017-05-14 RX ORDER — OXYCODONE HYDROCHLORIDE 5 MG/1
5-10 TABLET ORAL
Status: DISCONTINUED | OUTPATIENT
Start: 2017-05-14 | End: 2017-05-17 | Stop reason: HOSPADM

## 2017-05-14 RX ORDER — NALBUPHINE HYDROCHLORIDE 10 MG/ML
2.5-5 INJECTION, SOLUTION INTRAMUSCULAR; INTRAVENOUS; SUBCUTANEOUS EVERY 6 HOURS PRN
Status: DISCONTINUED | OUTPATIENT
Start: 2017-05-14 | End: 2017-05-14

## 2017-05-14 RX ORDER — ONDANSETRON 4 MG/1
4 TABLET, ORALLY DISINTEGRATING ORAL EVERY 30 MIN PRN
Status: CANCELLED | OUTPATIENT
Start: 2017-05-14

## 2017-05-14 RX ORDER — SODIUM CHLORIDE, SODIUM LACTATE, POTASSIUM CHLORIDE, CALCIUM CHLORIDE 600; 310; 30; 20 MG/100ML; MG/100ML; MG/100ML; MG/100ML
INJECTION, SOLUTION INTRAVENOUS CONTINUOUS
Status: CANCELLED | OUTPATIENT
Start: 2017-05-14

## 2017-05-14 RX ORDER — LANOLIN 100 %
OINTMENT (GRAM) TOPICAL
Status: DISCONTINUED | OUTPATIENT
Start: 2017-05-14 | End: 2017-05-17 | Stop reason: HOSPADM

## 2017-05-14 RX ORDER — LIDOCAINE HCL/EPINEPHRINE/PF 2%-1:200K
VIAL (ML) INJECTION PRN
Status: DISCONTINUED | OUTPATIENT
Start: 2017-05-14 | End: 2017-05-14

## 2017-05-14 RX ORDER — IBUPROFEN 400 MG/1
400-800 TABLET, FILM COATED ORAL EVERY 6 HOURS PRN
Status: DISCONTINUED | OUTPATIENT
Start: 2017-05-14 | End: 2017-05-17 | Stop reason: HOSPADM

## 2017-05-14 RX ORDER — HYDROCORTISONE 2.5 %
CREAM (GRAM) TOPICAL 3 TIMES DAILY PRN
Status: DISCONTINUED | OUTPATIENT
Start: 2017-05-14 | End: 2017-05-17 | Stop reason: HOSPADM

## 2017-05-14 RX ORDER — CITRIC ACID/SODIUM CITRATE 334-500MG
30 SOLUTION, ORAL ORAL
Status: COMPLETED | OUTPATIENT
Start: 2017-05-14 | End: 2017-05-14

## 2017-05-14 RX ORDER — MORPHINE SULFATE 1 MG/ML
INJECTION, SOLUTION EPIDURAL; INTRATHECAL; INTRAVENOUS PRN
Status: DISCONTINUED | OUTPATIENT
Start: 2017-05-14 | End: 2017-05-14

## 2017-05-14 RX ORDER — ALBUMIN, HUMAN INJ 5% 5 %
SOLUTION INTRAVENOUS CONTINUOUS PRN
Status: DISCONTINUED | OUTPATIENT
Start: 2017-05-13 | End: 2017-05-14

## 2017-05-14 RX ORDER — OXYTOCIN/0.9 % SODIUM CHLORIDE 30/500 ML
100 PLASTIC BAG, INJECTION (ML) INTRAVENOUS CONTINUOUS
Status: DISCONTINUED | OUTPATIENT
Start: 2017-05-14 | End: 2017-05-17 | Stop reason: HOSPADM

## 2017-05-14 RX ORDER — LIDOCAINE 40 MG/G
CREAM TOPICAL
Status: DISCONTINUED | OUTPATIENT
Start: 2017-05-14 | End: 2017-05-17 | Stop reason: HOSPADM

## 2017-05-14 RX ORDER — MEPERIDINE HYDROCHLORIDE 25 MG/ML
12.5 INJECTION INTRAMUSCULAR; INTRAVENOUS; SUBCUTANEOUS EVERY 5 MIN PRN
Status: CANCELLED | OUTPATIENT
Start: 2017-05-14

## 2017-05-14 RX ORDER — DIPHENHYDRAMINE HCL 25 MG
25 CAPSULE ORAL EVERY 6 HOURS PRN
Status: DISCONTINUED | OUTPATIENT
Start: 2017-05-14 | End: 2017-05-17 | Stop reason: HOSPADM

## 2017-05-14 RX ORDER — BISACODYL 10 MG
10 SUPPOSITORY, RECTAL RECTAL DAILY PRN
Status: DISCONTINUED | OUTPATIENT
Start: 2017-05-16 | End: 2017-05-17 | Stop reason: HOSPADM

## 2017-05-14 RX ORDER — ACETAMINOPHEN 325 MG/1
975 TABLET ORAL EVERY 8 HOURS
Status: DISCONTINUED | OUTPATIENT
Start: 2017-05-14 | End: 2017-05-17 | Stop reason: HOSPADM

## 2017-05-14 RX ORDER — LIDOCAINE HYDROCHLORIDE 20 MG/ML
INJECTION, SOLUTION EPIDURAL; INFILTRATION; INTRACAUDAL; PERINEURAL PRN
Status: DISCONTINUED | OUTPATIENT
Start: 2017-05-14 | End: 2017-05-14

## 2017-05-14 RX ORDER — KETOROLAC TROMETHAMINE 30 MG/ML
30 INJECTION, SOLUTION INTRAMUSCULAR; INTRAVENOUS EVERY 6 HOURS
Status: DISPENSED | OUTPATIENT
Start: 2017-05-14 | End: 2017-05-15

## 2017-05-14 RX ORDER — SIMETHICONE 80 MG
80 TABLET,CHEWABLE ORAL 4 TIMES DAILY PRN
Status: DISCONTINUED | OUTPATIENT
Start: 2017-05-14 | End: 2017-05-17 | Stop reason: HOSPADM

## 2017-05-14 RX ORDER — LIDOCAINE 40 MG/G
CREAM TOPICAL
Status: DISCONTINUED | OUTPATIENT
Start: 2017-05-14 | End: 2017-05-14

## 2017-05-14 RX ORDER — OXYCODONE HYDROCHLORIDE 5 MG/1
5 TABLET ORAL
Qty: 30 TABLET | Refills: 0 | Status: SHIPPED | OUTPATIENT
Start: 2017-05-14 | End: 2017-05-16

## 2017-05-14 RX ORDER — DIPHENHYDRAMINE HYDROCHLORIDE 50 MG/ML
25 INJECTION INTRAMUSCULAR; INTRAVENOUS EVERY 6 HOURS PRN
Status: DISCONTINUED | OUTPATIENT
Start: 2017-05-14 | End: 2017-05-17 | Stop reason: HOSPADM

## 2017-05-14 RX ORDER — SODIUM CHLORIDE, SODIUM LACTATE, POTASSIUM CHLORIDE, CALCIUM CHLORIDE 600; 310; 30; 20 MG/100ML; MG/100ML; MG/100ML; MG/100ML
INJECTION, SOLUTION INTRAVENOUS CONTINUOUS
Status: DISCONTINUED | OUTPATIENT
Start: 2017-05-14 | End: 2017-05-14 | Stop reason: HOSPADM

## 2017-05-14 RX ORDER — MORPHINE SULFATE 2 MG/ML
1-2 INJECTION, SOLUTION INTRAMUSCULAR; INTRAVENOUS EVERY 5 MIN PRN
Status: CANCELLED | OUTPATIENT
Start: 2017-05-14

## 2017-05-14 RX ORDER — AMOXICILLIN 250 MG
1-2 CAPSULE ORAL 2 TIMES DAILY
Status: DISCONTINUED | OUTPATIENT
Start: 2017-05-14 | End: 2017-05-17 | Stop reason: HOSPADM

## 2017-05-14 RX ORDER — FENTANYL CITRATE 50 UG/ML
INJECTION, SOLUTION INTRAMUSCULAR; INTRAVENOUS PRN
Status: DISCONTINUED | OUTPATIENT
Start: 2017-05-14 | End: 2017-05-14

## 2017-05-14 RX ORDER — ONDANSETRON 2 MG/ML
4 INJECTION INTRAMUSCULAR; INTRAVENOUS EVERY 30 MIN PRN
Status: CANCELLED | OUTPATIENT
Start: 2017-05-14

## 2017-05-14 RX ORDER — CITRIC ACID/SODIUM CITRATE 334-500MG
30 SOLUTION, ORAL ORAL
Status: CANCELLED | OUTPATIENT
Start: 2017-05-14

## 2017-05-14 RX ORDER — CEFAZOLIN SODIUM 2 G/100ML
2 INJECTION, SOLUTION INTRAVENOUS
Status: DISCONTINUED | OUTPATIENT
Start: 2017-05-14 | End: 2017-05-14 | Stop reason: HOSPADM

## 2017-05-14 RX ORDER — NALOXONE HYDROCHLORIDE 0.4 MG/ML
.1-.4 INJECTION, SOLUTION INTRAMUSCULAR; INTRAVENOUS; SUBCUTANEOUS
Status: DISCONTINUED | OUTPATIENT
Start: 2017-05-14 | End: 2017-05-17 | Stop reason: HOSPADM

## 2017-05-14 RX ORDER — IBUPROFEN 600 MG/1
600 TABLET, FILM COATED ORAL EVERY 6 HOURS PRN
Qty: 60 TABLET | Refills: 0 | Status: SHIPPED | OUTPATIENT
Start: 2017-05-14 | End: 2017-11-22

## 2017-05-14 RX ORDER — OXYTOCIN/0.9 % SODIUM CHLORIDE 30/500 ML
340 PLASTIC BAG, INJECTION (ML) INTRAVENOUS CONTINUOUS PRN
Status: DISCONTINUED | OUTPATIENT
Start: 2017-05-14 | End: 2017-05-17 | Stop reason: HOSPADM

## 2017-05-14 RX ORDER — EPHEDRINE SULFATE 50 MG/ML
5 INJECTION, SOLUTION INTRAMUSCULAR; INTRAVENOUS; SUBCUTANEOUS
Status: DISCONTINUED | OUTPATIENT
Start: 2017-05-14 | End: 2017-05-14

## 2017-05-14 RX ORDER — ACETAMINOPHEN 325 MG/1
650 TABLET ORAL EVERY 4 HOURS PRN
Qty: 60 TABLET | Refills: 0 | Status: SHIPPED | OUTPATIENT
Start: 2017-05-17 | End: 2017-11-22

## 2017-05-14 RX ORDER — CEFAZOLIN SODIUM 1 G/3ML
1 INJECTION, POWDER, FOR SOLUTION INTRAMUSCULAR; INTRAVENOUS
Status: CANCELLED | OUTPATIENT
Start: 2017-05-14

## 2017-05-14 RX ORDER — ONDANSETRON 2 MG/ML
4 INJECTION INTRAMUSCULAR; INTRAVENOUS EVERY 6 HOURS PRN
Status: DISCONTINUED | OUTPATIENT
Start: 2017-05-14 | End: 2017-05-17 | Stop reason: HOSPADM

## 2017-05-14 RX ORDER — NALOXONE HYDROCHLORIDE 0.4 MG/ML
.1-.4 INJECTION, SOLUTION INTRAMUSCULAR; INTRAVENOUS; SUBCUTANEOUS
Status: DISCONTINUED | OUTPATIENT
Start: 2017-05-14 | End: 2017-05-14

## 2017-05-14 RX ORDER — OXYTOCIN 10 [USP'U]/ML
10 INJECTION, SOLUTION INTRAMUSCULAR; INTRAVENOUS
Status: DISCONTINUED | OUTPATIENT
Start: 2017-05-14 | End: 2017-05-17 | Stop reason: HOSPADM

## 2017-05-14 RX ORDER — CEFAZOLIN SODIUM 1 G/3ML
1 INJECTION, POWDER, FOR SOLUTION INTRAMUSCULAR; INTRAVENOUS
Status: DISCONTINUED | OUTPATIENT
Start: 2017-05-14 | End: 2017-05-14 | Stop reason: HOSPADM

## 2017-05-14 RX ORDER — ACETAMINOPHEN 325 MG/1
650 TABLET ORAL EVERY 4 HOURS PRN
Status: DISCONTINUED | OUTPATIENT
Start: 2017-05-17 | End: 2017-05-17 | Stop reason: HOSPADM

## 2017-05-14 RX ORDER — MISOPROSTOL 200 UG/1
400 TABLET ORAL
Status: DISCONTINUED | OUTPATIENT
Start: 2017-05-14 | End: 2017-05-17 | Stop reason: HOSPADM

## 2017-05-14 RX ORDER — AMOXICILLIN 250 MG
1-2 CAPSULE ORAL 2 TIMES DAILY
Qty: 100 TABLET | Refills: 0 | Status: SHIPPED | OUTPATIENT
Start: 2017-05-14 | End: 2017-11-22

## 2017-05-14 RX ORDER — DEXTROSE, SODIUM CHLORIDE, SODIUM LACTATE, POTASSIUM CHLORIDE, AND CALCIUM CHLORIDE 5; .6; .31; .03; .02 G/100ML; G/100ML; G/100ML; G/100ML; G/100ML
INJECTION, SOLUTION INTRAVENOUS CONTINUOUS
Status: DISCONTINUED | OUTPATIENT
Start: 2017-05-14 | End: 2017-05-17 | Stop reason: HOSPADM

## 2017-05-14 RX ORDER — CEFAZOLIN SODIUM 2 G/100ML
2 INJECTION, SOLUTION INTRAVENOUS
Status: CANCELLED | OUTPATIENT
Start: 2017-05-14

## 2017-05-14 RX ORDER — CITRIC ACID/SODIUM CITRATE 334-500MG
SOLUTION, ORAL ORAL
Status: COMPLETED
Start: 2017-05-14 | End: 2017-05-14

## 2017-05-14 RX ADMIN — KETOROLAC TROMETHAMINE 30 MG: 30 INJECTION, SOLUTION INTRAMUSCULAR at 17:57

## 2017-05-14 RX ADMIN — ONDANSETRON 4 MG: 2 INJECTION INTRAMUSCULAR; INTRAVENOUS at 06:54

## 2017-05-14 RX ADMIN — Medication 5 ML: at 07:55

## 2017-05-14 RX ADMIN — PHENYLEPHRINE HYDROCHLORIDE 100 MCG: 10 INJECTION, SOLUTION INTRAMUSCULAR; INTRAVENOUS; SUBCUTANEOUS at 07:31

## 2017-05-14 RX ADMIN — CEFAZOLIN 1 G: 1 INJECTION, POWDER, FOR SOLUTION INTRAMUSCULAR; INTRAVENOUS at 08:35

## 2017-05-14 RX ADMIN — LIDOCAINE HYDROCHLORIDE,EPINEPHRINE BITARTRATE 3 ML: 20; .005 INJECTION, SOLUTION EPIDURAL; INFILTRATION; INTRACAUDAL; PERINEURAL at 06:38

## 2017-05-14 RX ADMIN — BUPIVACAINE 20 ML: 13.3 INJECTION, SUSPENSION, LIPOSOMAL INFILTRATION at 09:38

## 2017-05-14 RX ADMIN — LIDOCAINE HYDROCHLORIDE,EPINEPHRINE BITARTRATE 5 ML: 20; .005 INJECTION, SOLUTION EPIDURAL; INFILTRATION; INTRACAUDAL; PERINEURAL at 06:21

## 2017-05-14 RX ADMIN — PHENYLEPHRINE HYDROCHLORIDE 100 MCG: 10 INJECTION, SOLUTION INTRAMUSCULAR; INTRAVENOUS; SUBCUTANEOUS at 07:02

## 2017-05-14 RX ADMIN — SODIUM CITRATE AND CITRIC ACID MONOHYDRATE 30 ML: 500; 334 SOLUTION ORAL at 06:15

## 2017-05-14 RX ADMIN — FENTANYL CITRATE 100 MCG: 50 INJECTION, SOLUTION INTRAMUSCULAR; INTRAVENOUS at 06:38

## 2017-05-14 RX ADMIN — CEFAZOLIN 2 G: 1 INJECTION, POWDER, FOR SOLUTION INTRAMUSCULAR; INTRAVENOUS at 06:35

## 2017-05-14 RX ADMIN — OXYTOCIN-SODIUM CHLORIDE 0.9% IV SOLN 30 UNIT/500ML 300 ML/HR: 30-0.9/5 SOLUTION at 08:28

## 2017-05-14 RX ADMIN — SENNOSIDES AND DOCUSATE SODIUM 2 TABLET: 8.6; 5 TABLET ORAL at 20:03

## 2017-05-14 RX ADMIN — HUMAN INSULIN 1 UNITS/HR: 100 INJECTION, SOLUTION SUBCUTANEOUS at 03:09

## 2017-05-14 RX ADMIN — Medication: at 05:08

## 2017-05-14 RX ADMIN — LIDOCAINE HYDROCHLORIDE,EPINEPHRINE BITARTRATE 5 ML: 20; .005 INJECTION, SOLUTION EPIDURAL; INFILTRATION; INTRACAUDAL; PERINEURAL at 06:33

## 2017-05-14 RX ADMIN — Medication 30 ML: at 06:15

## 2017-05-14 RX ADMIN — Medication 3 MG: at 06:55

## 2017-05-14 RX ADMIN — ACETAMINOPHEN 975 MG: 325 TABLET, FILM COATED ORAL at 20:03

## 2017-05-14 RX ADMIN — SODIUM CHLORIDE, POTASSIUM CHLORIDE, SODIUM LACTATE AND CALCIUM CHLORIDE: 600; 310; 30; 20 INJECTION, SOLUTION INTRAVENOUS at 06:58

## 2017-05-14 RX ADMIN — LIDOCAINE HYDROCHLORIDE,EPINEPHRINE BITARTRATE 2 ML: 20; .005 INJECTION, SOLUTION EPIDURAL; INFILTRATION; INTRACAUDAL; PERINEURAL at 06:55

## 2017-05-14 RX ADMIN — KETOROLAC TROMETHAMINE 30 MG: 30 INJECTION, SOLUTION INTRAMUSCULAR at 12:10

## 2017-05-14 RX ADMIN — ACETAMINOPHEN 975 MG: 325 TABLET, FILM COATED ORAL at 12:19

## 2017-05-14 RX ADMIN — SODIUM CHLORIDE, POTASSIUM CHLORIDE, SODIUM LACTATE AND CALCIUM CHLORIDE: 600; 310; 30; 20 INJECTION, SOLUTION INTRAVENOUS at 00:36

## 2017-05-14 RX ADMIN — LIDOCAINE HYDROCHLORIDE,EPINEPHRINE BITARTRATE 5 ML: 20; .005 INJECTION, SOLUTION EPIDURAL; INFILTRATION; INTRACAUDAL; PERINEURAL at 06:29

## 2017-05-14 RX ADMIN — PHENYLEPHRINE HYDROCHLORIDE 100 MCG: 10 INJECTION, SOLUTION INTRAMUSCULAR; INTRAVENOUS; SUBCUTANEOUS at 07:15

## 2017-05-14 RX ADMIN — SODIUM CHLORIDE, SODIUM LACTATE, POTASSIUM CHLORIDE, CALCIUM CHLORIDE AND DEXTROSE MONOHYDRATE: 5; 600; 310; 30; 20 INJECTION, SOLUTION INTRAVENOUS at 04:26

## 2017-05-14 RX ADMIN — SODIUM CHLORIDE, POTASSIUM CHLORIDE, SODIUM LACTATE AND CALCIUM CHLORIDE 800 ML: 600; 310; 30; 20 INJECTION, SOLUTION INTRAVENOUS at 06:09

## 2017-05-14 RX ADMIN — SODIUM CHLORIDE, POTASSIUM CHLORIDE, SODIUM LACTATE AND CALCIUM CHLORIDE: 600; 310; 30; 20 INJECTION, SOLUTION INTRAVENOUS at 04:07

## 2017-05-14 RX ADMIN — OXYTOCIN-SODIUM CHLORIDE 0.9% IV SOLN 30 UNIT/500ML 300 ML/HR: 30-0.9/5 SOLUTION at 06:52

## 2017-05-14 RX ADMIN — LIDOCAINE HYDROCHLORIDE,EPINEPHRINE BITARTRATE 5 ML: 20; .005 INJECTION, SOLUTION EPIDURAL; INFILTRATION; INTRACAUDAL; PERINEURAL at 06:25

## 2017-05-14 NOTE — OR NURSING
OR to PACU Transfer Note  Data: Pt to OB PACU at 0841 via cart. PIV infusing NS with 30 mu/min. without complications, medley with clear urine to gravity, temp 97.7, vs WNL, pt does not complain of pain and/or nausea.   Interventions: IV to pump, monitors and alarms on, SCD on.  Response: stable.  Plan: Patient instructed to notify RN for pain or nausea, routine post op cares, initiate breastfeeding/pumping as soon as patient/infant able.

## 2017-05-14 NOTE — PROVIDER NOTIFICATION
05/13/17 1918   Provider Notification   Provider Name/Title Dr. Bowser    Method of Notification At Bedside   Orders to restart pitocin (2 mu/min) and insert medley cath per Dr. Bowser.

## 2017-05-14 NOTE — ANESTHESIA POSTPROCEDURE EVALUATION
Patient: Liam Ellis    Procedure(s):   - Wound Class: I-Clean    Diagnosis:* No pre-op diagnosis entered *  Diagnosis Additional Information: No value filed.    Anesthesia Type:  MAC, Epidural    Note:  Anesthesia Post Evaluation    Patient location: Obstetric PACU.  Patient participation: Able to fully participate in evaluation  Level of consciousness: awake and alert  Pain management: adequate  Airway patency: patent  Cardiovascular status: hemodynamically stable and acceptable  Respiratory status: acceptable  Hydration status: acceptable  PONV: none     Anesthetic complications: None          Last vitals:  Vitals:    05/14/17 0400 05/14/17 0430 05/14/17 0500   BP: 121/71 119/78 127/78   Pulse:      Resp:      Temp:   36.7  C (98.1  F)   SpO2: 99% 100% 100%         Electronically Signed By: Sawyer Calderon MD, MD  May 14, 2017  7:12 AM

## 2017-05-14 NOTE — PROGRESS NOTES
Actively pushing x 3+ hours, caput is visible at the introitus with a push, but vtx does not descend below +1.  FHTs 160s, moderate variability, variable decelerations.  She has been pushing well but is exhausted.  Discussed options, advise primary C/S, discussed risks/benefits.  They wish to talk among themselves and decide.     Addendum:  Patient agrees to proceed with C/S.

## 2017-05-14 NOTE — DISCHARGE SUMMARY
DELIVERY DISCHARGE SUMMARY    Admit date: 2017  Discharge date: 2017     Admit Dx:   - 37 year old  at 39w2d   - GDMA2    Discharge Dx:  - Same as above, delivered  - Arrest of Descent  - Maternal exhaustion  - Acute blood loss anemia secondary to surgery     Procedures:  - Low transverse  section with double layer closure via Pfannenstiel incision  - Epidural anesthesia  - MMR    Admit HPI and  section Indication: Liam Ellis is a 37 year old,  at 39w2d who was undergoing induction of labor for GDMA2.  She received cervical ripening followed by pitocin augmentation.  She progressed to complete and began pushing.  She made initial descent over the first two hours of pushing.  The third hour of pushing had no further progress with development of significant fetal caput.  At this time she also had significant exhaustion.  The risks/benefits/alternatives of a  section were discussed and the patient agreed to proceed.    Intraoperative Course: Single viable male infant at 0650 hours on 17. Apgars of 5 and 8 at one and five minutes.  Birth weight (GM): 3005 GM.  Fetal presentation: cephalic.  Amniotic fluid: thick meconium.  Arterial Cord pH 7.14 with base excess 10.7.  Placenta intact with 3 vessel cord.   Normal appearing uterus, fallopian tubes, ovaries.  Left extension of the hysterotomy extending 3cm inferiorly. Right mesosalpinx with spontaneous laceration into venous plexus with brisk bleeding, controlled with interrupted stitches after direct pressure failed. Sutures traversed right fallopian tube, compromising tubal patency--see operative note.  EBL 1100 mL.    Postoperative Course:   Her postoperative course was complicated by nothing.    On POD#3, she was meeting all of her postpartum goals and deemed stable for discharge. She was voiding without difficulty, tolerating a regular diet without nausea and vomiting, her pain was well controlled on  oral pain medicines and her lochia was appropriate. Her hemoglobin prior to delivery was 11.5 and after delivery was 8.4. Her Rh status was positive and Rhogam was not inidcated. At the time of discharge, she was breast feeding her infant and was undecided regarding birth control following counseling.     Discharge/Disposition:  Liam Ellis was discharged to home in stable condition with the following instructions/medications:  - Call for temperature > 100.4, foul smelling vaginal discharge, bleeding > 1 pad per hour x 2 hrs, pain not controlled by oral pain meds, severe constipation or severe nausea or vomiting.  - She was instructed to follow-up with her primary OB in 6 weeks for a routine postpartum visit and 2-hour GCT  - She was discharged home with the following medications:    Liam Ellis   Home Medication Instructions [place]:[id number]    Printed on:05/16/17 9444   Medication Information                      acetaminophen (TYLENOL) 325 MG tablet  Take 2 tablets (650 mg) by mouth every 4 hours as needed for other (surgical pain)             ferrous sulfate (IRON) 325 (65 FE) MG tablet  Take 1 tablet (325 mg) by mouth daily (with breakfast)             ibuprofen (ADVIL/MOTRIN) 600 MG tablet  Take 1 tablet (600 mg) by mouth every 6 hours as needed for other (cramping)             oxyCODONE (ROXICODONE) 5 MG IR tablet  Take 1 tablet (5 mg) by mouth every 3 hours as needed for moderate to severe pain             Prenatal Vit-Fe Fumarate-FA (PRENATAL MULTIVITAMIN  PLUS IRON) 27-0.8 MG TABS  Take 1 tablet by mouth daily             senna-docusate (SENOKOT-S;PERICOLACE) 8.6-50 MG per tablet  Take 1-2 tablets by mouth 2 times daily                  Penny Stiles MD   OB/Gyn Resident, PGY-3  May 17, 2017

## 2017-05-14 NOTE — OP NOTE
[facility]  Obstetrics Service Operative Report    Surgery Date:  May 14, 2017  Surgeon(s): Dr. Tracy Bowser  Assistants:    Ange Jimenez MD PGY3  Lauri Murillo, MS4  Shahida Raphael MD - OB Staff    Preoperative Diagnoses:  1.  Intrauterine pregnancy at 39w2d  2.  GDMA2  3.  Arrest of descent  4.  Maternal exhaustion    Postoperative diagnoses: Same    Procedure performed:    Primary low segment transverse  section via Pfannenstiel incision with double layer uterine closure    Anesthesia:  Epidural  Est Blood Loss (mL): 1100 mL    Specimens: Placenta, cord gases    Complications: Spontaneous laceration of right mesosalpinx venous plexus, repaired    Operative findings: Single viable male infant at 0650 hours on 17. Apgars of 5 and 8 at one and five minutes.  Birth weight (GM): 3005 GM.  Fetal presentation: cephalic.  Amniotic fluid: thick meconium.  Arterial Cord pH 7.14 with base excess 10.7.  Placenta intact with 3 vessel cord.   Normal appearing uterus, fallopian tubes, ovaries.  Left extension of the hysterotomy extending 3cm inferiorly. Right mesosalpinx with spontaneous laceration into venous plexus with brisk bleeding, controlled with interrupted stitches    Indication: Liam Ellis is a 37 year old,  at 39w2d who was undergoing induction of labor for GDMA2.  She received cervical ripening followed by pitocin augmentation.  She made slow progress in the first stage of labor.  She progressed to complete and began pushing.  She made initial descent over the first two hours of pushing.  The third hour of pushing had no further progress with development of significant fetal caput.  At this time she also had significant exhaustion.  Given prolonged labor and arrest of descent despite active pushing, she was recommended delivery by  section.  The risks/benefits/alternatives of a  section were discussed and the patient agreed to proceed.    Procedure details:   After obtaining informed consent, the patient was taken to the operating room. She received 2g Ancef prior to the skin incision. She was placed in the dorsal supine position with a leftward tilt and prepped and draped in the usual sterile fashion. Following test of adequate epidural anesthesia, the abdomen was entered through a transverse skin incision.  The skin incision was made sharply and carried through the subcutaneous tissue to the fascia.  Fascia was incised in the midline and extended laterally with the Krishnan scissors.  The superior margin of the fascial incision was grasped with Kocher clamps and dissected from the underlying muscle sharp and blunt dissecton, which was then repeated at the lower margin of the fascial incision.  The muscle was  in the midline.  The peritoneum was entered bluntly and the opening extended by digital dissection with care to avoid the bladder. A bladder blade was placed. The lower segment of the uterus was opened sharply in a transverse fashion and extended with digital pressure. The infant's head was elevated to the level of the hysterotomy and was delivered atraumatically. The cord was doubly clamped and cut and the infant was handed off to the waiting NICU staff. A segment of the cord was cut and set aside for cord gases. The placenta was manually extracted.  The uterus was exteriorized from the abdomen and cleared of all clots and debris.  The uterus was massaged and was noted to be boggy, with lax muscularity at the uterine fundus.  The uterus and adnexa appears edematous.  Oxytocin was given through the running IV.  With massage as well as administration of oxytocin, good uterine tone was achieved. The hysterotomy was repaired with 0-vicryl suture in a running locked fashion. A 2nd layer of 0-monocryl was used to imbricate the incision and good hemostasis was achieved.  The posterior cul-de-sac was irrigated.  The moist pack which had been employed to wrap and  protect the uterus while it was exterioized was then removed preparatory to returning the uterus to the abdominal cavity.  At this time, multiple large dilated infundibulopelvic varicosities were noted, and brisk bleeding was coming from a spontaneous 1 cm laceration of one of the distended veins in the right mesosalpinx.  The laceration was located just inferior to the right fallopian tube, and adjacent to the right uterine cornua.  Pressure was held for 20 minutes without improvement.  The vessel continued to bleed briskly.  Subsequently the area of spontaneous laceration was cross-clamped (a process which required cross-clamping of the right fallopian tube as well).  Dr Raphael was asked to scrub in, assess and advise.  She agreed that hemostasis could most safely be advised by suturing these clamped areas, as any attempt to suture ligate the infundibulopelvic vessel without including the fallopian tube would likely fail, leading to more bleeding and possible need for right salpingo-oophorectomy to control the bleeding.  Hemostasis was achieved with the use of two kerwin stitches over the fallopian tube and the underlying engorged vessels. The second, more inferior stitch, was anchored through a portion of myometrium.  The active bleeding was thus controlled.  The patient was awake during the procedure, and was informed of the situation, and advised that the placement of these sutures, while required to control the bleeding, would likely result in closure of the right fallopian tube.      The uterus was returned to the abdomen.  The bilateral pericolic gutters were irrigated.  The hysterotomy was again inspected and found to have no active sites of bleeding.  The abdominal wall was examined and found to have no active sites of bleeding.  The fascia was closed with a running suture of 0-vicryl.  Subcutaneous tissue was irrigated. Areas that were oozing were controlled with cautery. The skin was closed with 4-0  vicryl.     The patient tolerated the procedure well and was taken to the recovery room in stable condition. All sponge, needle and instrument counts were correct x2. Dr. Bowser was scrubbed and present for the entire procedure.     The assistance of Dr. Raphael was required due to the need for additional skilled surgical hands to retract and hold instruments due to the nature of the surgical procedure and risk of complications.     Ange Jimenez MD  OB/GYN Resident, PGY2  05/14/17    Tracy Bowser MD

## 2017-05-14 NOTE — PROGRESS NOTES
Labor Progress Note    Vitals:    17 2330 17 2332 17 2334 17 2340   BP: 104/57 105/57 106/59 108/56   Pulse:       Resp:       Temp:       TempSrc:       SpO2: 96%  96% 97%     Gen: resting comfortably  Cvx: 9/100/+2; cervix on left side non-reducible    FHT: Baseline 160, moderate, +accels, intermittent late decels  [place]: angella q2-3 min    A/P: 37 year old  at 39w2d now in active labor undergoing IOL for GDMA2.    -continue pitocin, titrate per protcol  -epidural in place  -GBS neg  -anticipate     Fetal Wellbeing  -Category II, reactive; continue to closely monitor    Recheck in 1 hour    Ange Jimenez MD  OB/GYN Resident, PGY3  17 12:31 AM

## 2017-05-14 NOTE — PROVIDER NOTIFICATION
05/13/17 2300   Provider Notification   Provider Name/Title Anesthesia   Method of Notification Phone   Request Evaluate in Person   Notification Reason Pain   Patient experiencing significant left lower hip pain with contractions. Anesthesia requested to eval in person and likely admin bolus.

## 2017-05-14 NOTE — ANESTHESIA CARE TRANSFER NOTE
Patient: Liam Ellis    Procedure(s):   - Wound Class: II-Clean Contaminated    Diagnosis: * No pre-op diagnosis entered *  Diagnosis Additional Information: No value filed.    Anesthesia Type:   MAC, Epidural     Note:  Airway :Room Air  Patient transferred to:Labor and Delivery        Vitals: (Last set prior to Anesthesia Care Transfer)    CRNA VITALS  5/14/2017 0810 - 5/14/2017 0848      5/14/2017             NIBP: 102/81    NIBP Mean: 88                Electronically Signed By: ELLEN Gutierrez CRNA  May 14, 2017  8:48 AM

## 2017-05-14 NOTE — PLAN OF CARE
Problem: Individualization  Goal: Patient Preferences  Data: Liam Ellis transferred to 7122 via wheelchair at 1450. Baby transferred via parent's arms.  Action: Receiving unit notified of transfer: Yes. Patient and family notified of room change. Report given to Twan Brown RN at 1515. Belongings sent to receiving unit. Accompanied by Registered Nurse. Oriented patient to surroundings. Call light within reach. ID bands double-checked with receiving RN.  Response: Patient tolerated transfer and is stable.

## 2017-05-14 NOTE — PLAN OF CARE
Problem: Goal Outcome Summary  Goal: Goal Outcome Summary  Outcome: Therapy, unable to show any progress toward functional goals  Patient resting comfortably without c/o pain. Utilizing PCA button for relief when needed. See flowsheet for uterine/fetal activity. Cat II tracing resolved earlier this shift with position changes, IV fluid bolus, O2 therapy, and discontinuation of pitocin. IUPC placed at that time to monitor for adequate contractions. Pitocin restarted per Dr. Bowser at 1918 at 2 mu/min. Titrated q 30 mins and now running at 6 mu/min. Romero cath placed at 1950 in the likely case of C/S. Next SVE at 2100. Insulin gtt off at 1805 and treated for hypoglycemia at 1902. BG >100 after administering x2 15 g of CHO. Will continue to update provider and titrate pitocin as appropriate.

## 2017-05-14 NOTE — PROGRESS NOTES
Delayed entry (I was called to emergent C/S)  I assessed patient earlier, Pitocin had been stopped x 1 hour, was discontinued when FHTs showed minimal variability, tachycardia 160-170s, occasional late deceleration.  Ctx spaced out, FHTs improved.  Now with moderate variability, baseline 160s.  Pitocin has been restarted.  Had discussed with patient and  indication for primary C/S if fetus does not tolerate resumption of labor.  Will continue to monitor closely.

## 2017-05-14 NOTE — PROGRESS NOTES
[facility]  DYSTOCIA ARREST HUDDLE       Choose one diagnosis below  Diagnosis of Second Stage Arrest:  Criteria not met, proceeded with  for the following reason: primiparous patient with three hours of pushing and minimal descent.  Maternal exhaustion.

## 2017-05-14 NOTE — ANESTHESIA PROCEDURE NOTES
Peripheral Nerve Block Procedure Note    Staff:     Anesthesiologist:  AN GASPAR  Location: OB and PACU  Procedure Start/Stop TImes:     patient identified, IV checked, site marked, risks and benefits discussed, informed consent, monitors and equipment checked, pre-op evaluation, at physician/surgeon's request and post-op pain management      Correct Patient: Yes      Correct Position: Yes      Correct Site: Yes      Correct Procedure: Yes      Correct Laterality:  Yes    Site Marked:  N/A  Procedure details:     Procedure:  TAP    ASA:  3    Laterality:  Bilateral    Position:  Supine    Sterile Prep: chloraprep, mask and sterile gloves      Insertion Site:  T10-11    Needle:  Short bevel and insulated    Needle gauge:  21    Needle length (inches):  4    Ultrasound: Yes      Ultrasound used to identify targeted nerve, plexus, or vascular structure and placed a needle adjacent to it      A permanent image is NOT entered into the patient's record.      Abnormal pain on injection: No      Blood Aspirated: No      Paresthesias:  No    Bleeding at site: No      Infusion Method:  Single Shot    Complications:  None  Assessment/Narrative:     Injection made incrementally with aspirations every (mL):  5     Good US visualization;  Printer out of paper; will print Monday when staff change paper

## 2017-05-14 NOTE — PROGRESS NOTES
Feeling comfortable    Vitals:    17 2130   BP:  121/77 119/74 116/77   Pulse:       Resp:       Temp: 98.1  F (36.7  C)      TempSrc: Oral      SpO2:  97% 97% 96%     Gen: resting comfortably  Cvx: 6-7/100/+1    FHT: Baseline 150s, moderate, isolated variable decels, +accels  [place]: angella q2-3 min    A/P: 37 year old  at 39w1d now in active labor undergoing IOL for GDMA2.    -continue pitocin, titrate per protcol  -epidural in place  -GBS neg  -anticipate     Fetal Wellbeing  -Category II, reactive; much improved from prior in afternoon      Recheck in 2-3 hours    Ange Jimenez MD  OB/GYN Resident, PGY3  17 10:02 PM

## 2017-05-15 LAB
ABO + RH BLD: NORMAL
ABO + RH BLD: NORMAL
BLD GP AB SCN SERPL QL: NORMAL
BLOOD BANK CMNT PATIENT-IMP: NORMAL
GLUCOSE BLDC GLUCOMTR-MCNC: 78 MG/DL (ref 70–99)
GLUCOSE SERPL-MCNC: 173 MG/DL (ref 70–99)
HGB BLD-MCNC: 8.4 G/DL (ref 11.7–15.7)
SPECIMEN EXP DATE BLD: NORMAL

## 2017-05-15 PROCEDURE — 85018 HEMOGLOBIN: CPT | Performed by: OBSTETRICS & GYNECOLOGY

## 2017-05-15 PROCEDURE — 25000132 ZZH RX MED GY IP 250 OP 250 PS 637: Performed by: OBSTETRICS & GYNECOLOGY

## 2017-05-15 PROCEDURE — 36415 COLL VENOUS BLD VENIPUNCTURE: CPT | Performed by: OBSTETRICS & GYNECOLOGY

## 2017-05-15 PROCEDURE — 86901 BLOOD TYPING SEROLOGIC RH(D): CPT | Performed by: OBSTETRICS & GYNECOLOGY

## 2017-05-15 PROCEDURE — 82947 ASSAY GLUCOSE BLOOD QUANT: CPT | Performed by: OBSTETRICS & GYNECOLOGY

## 2017-05-15 PROCEDURE — 00000146 ZZHCL STATISTIC GLUCOSE BY METER IP

## 2017-05-15 PROCEDURE — 86850 RBC ANTIBODY SCREEN: CPT | Performed by: OBSTETRICS & GYNECOLOGY

## 2017-05-15 PROCEDURE — 25000128 H RX IP 250 OP 636: Performed by: OBSTETRICS & GYNECOLOGY

## 2017-05-15 PROCEDURE — 86900 BLOOD TYPING SEROLOGIC ABO: CPT | Performed by: OBSTETRICS & GYNECOLOGY

## 2017-05-15 PROCEDURE — 12000028 ZZH R&B OB UMMC

## 2017-05-15 RX ADMIN — ACETAMINOPHEN 975 MG: 325 TABLET, FILM COATED ORAL at 20:24

## 2017-05-15 RX ADMIN — KETOROLAC TROMETHAMINE 30 MG: 30 INJECTION, SOLUTION INTRAMUSCULAR at 01:31

## 2017-05-15 RX ADMIN — SENNOSIDES AND DOCUSATE SODIUM 1 TABLET: 8.6; 5 TABLET ORAL at 20:24

## 2017-05-15 RX ADMIN — ACETAMINOPHEN 975 MG: 325 TABLET, FILM COATED ORAL at 12:35

## 2017-05-15 RX ADMIN — IBUPROFEN 800 MG: 400 TABLET ORAL at 07:56

## 2017-05-15 RX ADMIN — SENNOSIDES AND DOCUSATE SODIUM 2 TABLET: 8.6; 5 TABLET ORAL at 07:56

## 2017-05-15 RX ADMIN — IBUPROFEN 800 MG: 400 TABLET ORAL at 14:57

## 2017-05-15 RX ADMIN — IBUPROFEN 800 MG: 400 TABLET ORAL at 23:06

## 2017-05-15 RX ADMIN — ACETAMINOPHEN 975 MG: 325 TABLET, FILM COATED ORAL at 04:14

## 2017-05-15 NOTE — PLAN OF CARE
Problem: Goal Outcome Summary  Goal: Goal Outcome Summary  Outcome: Improving  Data: Vital signs within normal limits. Postpartum checks within normal limits - see flow record. Patient eating and drinking normally. Romero removed. Patient able to empty bladder independently, adequate output. No apparent signs of infection. C/S dressing clean, dry, and intact. Patient performing self cares and is able to care for infant.  Action: Pain has been adequately managed with Tylenol and Toradol. Patient education done about hand expression and latching baby.  Response: Positive attachment behaviors observed with infant. Support person, significant other, present.   Plan: Continue with the plan of care.

## 2017-05-15 NOTE — PLAN OF CARE
Problem: Goal Outcome Summary  Goal: Goal Outcome Summary  Outcome: Improving  Data: Vital signs within normal limits except slight tachycardia (pulse 105). Continue to monitor. Postpartum checks within normal limits - see flow record. Patient eating and drinking normally. Romero removed at 2145 and is pt up ambulating. No apparent signs of infection. C/S dressing clean, dry, and intact. Patient performing self cares with minimal assistance and is able to care for infant.  Action: Pain has been adequately controlled with Tylenol. Patient education done about hand expression and formula education.  Response: Positive attachment behaviors observed with infant. Support person, significant other, present.   Plan: Continue with the plan of care.
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
## 2017-05-15 NOTE — PROGRESS NOTES
Post Partum Progress Note  PPD#1    Subjective:  She is resting comfortably in bed this morning. Pain is improving and well controlled on current medication regimen. She is tolerating PO intake.  Lochia present and similar to peak menstrual flow.  She is voiding without difficulty.  Shis has passed flatus but no BM as of yet. She is ambulating without dizziness or difficulty.  She denies headache, changes in vision, nausea/vomiting, chest pain, shortness of breath, RUQ pain, or worsening edema.  Plans to breast feed.  Unsure of BC plans.     Objective:  Vitals:    17 1830 17 2000 05/15/17 0000 05/15/17 0412   BP:  107/75 103/68 94/65   Pulse:       Resp: 20 20 20 16   Temp:  97.8  F (36.6  C) 97.9  F (36.6  C) 97.7  F (36.5  C)   TempSrc:  Oral Oral Oral   SpO2: 97% 100% 95% 96%     General: NAD. A&Ox3.  Pulm: Normal respiratory effort.  Abd: Soft, non-tender, non-distended. Fundus is firm at the umbilicus.    Incision: Pressure dressing clean, dry, intact  Ext: Trace lower extremity edema bilaterally. No calf tenderness.    Assessment/Plan:  Liam Ellis is a 37 year old  female who is PPD#1 s/p PLTCS for arrest of descent following induction of labor for GDMA2.    - Encourage routine post-operative goals including ambulation and incentive spirometry  - PNC: Rh positive. Rubella nonimmune; MMR ordered.  - Pain: controlled on oral medications  - Heme: Hgb 11.5>EBL 1200>AM Hgb pending.  VSS and asymptomatic. If <10 will discharge home with iron.  - GI: continue anti-emetics and stool softeners as needed.  - : S/p medley, voiding spontaneously  - Infant: Stable in room with mom  - Feeding: Plans on bresatfeeding.  - BC: Unsure, progesterone only options reviewed in detail given desires for breastfeeding  - GDMA2: on glyburide in pregnancy. AM FBG pending.    Discharge POD#3-4; once meeting postpartum discharge goals: voiding & ambulating without difficulty, tolerating a regular diet without  nausea and vomiting, passing flatus, pain well controlled on oral pain medicines, lochia appropriate, afebrile with vital signs within normal limits.      Penny Stiles MD   OB/Gyn Resident, PGY-3  May 15, 2017, 6:28 AM       Doing well, eating regular diet, tolerating it.  No complaints.  hgb not back, will order.    Dede Butts MD

## 2017-05-16 LAB
BLD PROD TYP BPU: NORMAL
BLD PROD TYP BPU: NORMAL
BLD UNIT ID BPU: 0
BLD UNIT ID BPU: 0
BLOOD PRODUCT CODE: NORMAL
BLOOD PRODUCT CODE: NORMAL
BPU ID: NORMAL
BPU ID: NORMAL
GLUCOSE BLDC GLUCOMTR-MCNC: 118 MG/DL (ref 70–99)
GLUCOSE BLDC GLUCOMTR-MCNC: 65 MG/DL (ref 70–99)
GLUCOSE BLDC GLUCOMTR-MCNC: 79 MG/DL (ref 70–99)
TRANSFUSION STATUS PATIENT QL: NORMAL

## 2017-05-16 PROCEDURE — 25000125 ZZHC RX 250: Performed by: OBSTETRICS & GYNECOLOGY

## 2017-05-16 PROCEDURE — 90707 MMR VACCINE SC: CPT | Performed by: OBSTETRICS & GYNECOLOGY

## 2017-05-16 PROCEDURE — 12000028 ZZH R&B OB UMMC

## 2017-05-16 PROCEDURE — 00000146 ZZHCL STATISTIC GLUCOSE BY METER IP

## 2017-05-16 PROCEDURE — 25000132 ZZH RX MED GY IP 250 OP 250 PS 637: Performed by: OBSTETRICS & GYNECOLOGY

## 2017-05-16 RX ORDER — OXYCODONE HYDROCHLORIDE 5 MG/1
5 TABLET ORAL
Qty: 15 TABLET | Refills: 0 | Status: SHIPPED | OUTPATIENT
Start: 2017-05-16 | End: 2017-05-22

## 2017-05-16 RX ORDER — FERROUS SULFATE 325(65) MG
325 TABLET ORAL
Qty: 60 TABLET | Refills: 2 | Status: SHIPPED | OUTPATIENT
Start: 2017-05-16 | End: 2017-05-26

## 2017-05-16 RX ADMIN — SENNOSIDES AND DOCUSATE SODIUM 2 TABLET: 8.6; 5 TABLET ORAL at 19:45

## 2017-05-16 RX ADMIN — ACETAMINOPHEN 975 MG: 325 TABLET, FILM COATED ORAL at 14:02

## 2017-05-16 RX ADMIN — ACETAMINOPHEN 975 MG: 325 TABLET, FILM COATED ORAL at 04:48

## 2017-05-16 RX ADMIN — MEASLES, MUMPS, AND RUBELLA VIRUS VACCINE LIVE 0.5 ML: 1000; 12500; 1000 INJECTION, POWDER, LYOPHILIZED, FOR SUSPENSION SUBCUTANEOUS at 19:46

## 2017-05-16 RX ADMIN — IBUPROFEN 800 MG: 400 TABLET ORAL at 11:05

## 2017-05-16 RX ADMIN — IBUPROFEN 800 MG: 400 TABLET ORAL at 04:48

## 2017-05-16 RX ADMIN — ACETAMINOPHEN 975 MG: 325 TABLET, FILM COATED ORAL at 22:07

## 2017-05-16 RX ADMIN — OXYCODONE HYDROCHLORIDE 10 MG: 5 TABLET ORAL at 22:07

## 2017-05-16 RX ADMIN — IBUPROFEN 800 MG: 400 TABLET ORAL at 19:45

## 2017-05-16 RX ADMIN — SENNOSIDES AND DOCUSATE SODIUM 1 TABLET: 8.6; 5 TABLET ORAL at 11:06

## 2017-05-16 NOTE — PROGRESS NOTES
Post Partum Progress Note  POD#3    Subjective:  She is resting comfortably in bed this morning. Pain is well controlled on current medication regimen. She is tolerating PO intake without N/V.  Lochia continues to decresae.  She is voiding without difficulty.  Chapin has passed flatus and had a BM yesterday. She is ambulating without dizziness or difficulty.  Breast feeding is going OK, supplementing with bottle. Feels ready for DC home today.    Objective:  Vitals:    17 0021 17 0747 17 1533 17 2330   BP: 100/72 103/72 113/70 103/61   Pulse:   87 80   Resp: 16 18 16 16   Temp: 98.3  F (36.8  C) 98.7  F (37.1  C) 97.9  F (36.6  C) 98  F (36.7  C)   TempSrc: Oral Oral Oral Oral   SpO2:         General: NAD.   Pulm: Normal respiratory effort.  Abd: Soft, non-tender, non-distended. Fundus is firm at -1 the umbilicus.    Incision:  Pfannenstiel well approximated with overlying steri-strips. No erythema, warmth, induration or drainage.     Ext: 1+ pitting lower extremity edema bilaterally. No calf tenderness.    Assessment/Plan:  Liam Ellis is a 37 year old  female who is POD#3 s/p PLTCS for arrest of descent following induction of labor for GDMA2.    - Encourage routine post-operative goals including ambulation and incentive spirometry  - PNC: Rh positive. Rubella nonimmune; MMR given.  - Pain: controlled on oral medications  - Heme: Hgb 11.5>EBL 1200>8.4.  VSS and asymptomatic. Will discharge home with iron.  - GI: Continue anti-emetics and scheduled stool softeners.  - : S/p medley, voiding spontaneously  - Infant: Stable in room with mom  - Feeding: Bresatfeeding.  - BC: Unsure, progesterone only options reviewed in detail given desires for breastfeeding  - GDMA2: FBG of 78.  2-hour GCT at PP visit.     Discharge home today    Penny Stiles MD   OB/Gyn Resident, PGY-3  2017     Patient evaluated and examined by me.  Agree with resident note.      Tracy Bowser MD

## 2017-05-16 NOTE — PLAN OF CARE
Problem: Goal Outcome Summary  Goal: Goal Outcome Summary  Outcome: Improving  [id number]  Problem: Goal Outcome Summary  Goal: Goal Outcome Summary  Outcome: No Change  [id number]  Data: Vital signs within normal limits. Postpartum checks within normal limits - see flow record. Patient eating and drinking normally. Patient able to empty bladder independently and is up ambulating. No apparent signs of infection. incision C/D/I. Patient performing self cares and is able to care for infant.  Action: Patient was medicated during the shift for abdominal pain. See MAR. Patient reassessed within 1 hour after each medication and pain was improved - patient stated she was comfortable. Patient education done on self care and pain management.   Response: Positive attachment behaviors observed with infant. Support persons present.   Plan: Anticipate discharge on when medically stable.

## 2017-05-16 NOTE — PLAN OF CARE
Problem: Goal Outcome Summary  Goal: Goal Outcome Summary  Outcome: Improving  Data: Vital signs within normal limits. Postpartum checks within normal limits - see flow record. Patient eating and drinking normally. Patient able to empty bladder independently and is up ambulating. No apparent signs of infection. Incision healing well. Patient performing self cares and is able to care for infant.  Action: Patient medicated during the shift for pain. See MAR. Patient reassessed within 1 hour after each medication and pain was improved - patient stated she was comfortable. Patient education done about personal cares. See flow record.  Response: Positive attachment behaviors observed with infant. Support persons  present.   Plan: Anticipate discharge Wednesday May 17, 2017.

## 2017-05-16 NOTE — PROGRESS NOTES
Post Partum Progress Note  POD#2    Subjective:  She is resting comfortably in bed this morning. Pain is improving and well controlled on current medication regimen. She is tolerating PO intake without N/V.  Lochia is less than menses.  She is voiding without difficulty.  Shis has passed flatus but no BM as of yet. She is ambulating without dizziness or difficulty.  Breast feeding is going OK.     Objective:  Vitals:    05/15/17 0412 05/15/17 0828 05/15/17 1532 17 0021   BP: 94/65 98/69 (!) 89/57 100/72   Pulse:       Resp: 16 16 16 16   Temp: 97.7  F (36.5  C) 97.9  F (36.6  C) 97.9  F (36.6  C) 98.3  F (36.8  C)   TempSrc: Oral Oral Oral Oral   SpO2: 96%        General: NAD.   Pulm: Normal respiratory effort.  Abd: Soft, non-tender, non-distended. Fundus is firm at the umbilicus.    Incision:  Pfannenstiel well approximated with overlying steri-strips. No erythema, warmth, induration or drainage.     Ext: 1+ pitting lower extremity edema bilaterally. No calf tenderness.    Assessment/Plan:  Liam Ellis is a 37 year old  female who is PPD#1 s/p PLTCS for arrest of descent following induction of labor for GDMA2.    - Encourage routine post-operative goals including ambulation and incentive spirometry  - PNC: Rh positive. Rubella nonimmune; MMR ordered.  - Pain: controlled on oral medications  - Heme: Hgb 11.5>EBL 1200>8.4.  VSS and asymptomatic. Will discharge home with iron.  - GI: continue anti-emetics and stool softeners as needed.  - : S/p medley, voiding spontaneously  - Infant: Stable in room with mom  - Feeding: Plans on bresatfeeding.  - BC: Unsure, progesterone only options reviewed in detail given desires for breastfeeding  - GDMA2: FBG of 78.  2-hour GCT at PP visit.   Discharge POD#3; once meeting postpartum discharge goals: voiding & ambulating without difficulty, tolerating a regular diet without nausea and vomiting, passing flatus, pain well controlled on oral pain medicines, lochia  appropriate, afebrile with vital signs within normal limits.      Penny Stiles MD   OB/Gyn Resident, PGY-3  5/16/2017     Physician Attestation   I, Nan Chowdhury, personally examined and evaluated this patient.  I discussed the patient with the resident and care team, and agree with the assessment and plan of care as documented in the resident s note of 05/16/17  [date].      I personally reviewed vital signs, medications, labs and exam.    Key findings: Doing well. Not taking any narcotic pain medication. Does think she'll need a little for discharge. Ambulating.   Plan DC tomorrow.  Nan Chowdhury  Date of Service (when I saw the patient): 05/16/17

## 2017-05-16 NOTE — PLAN OF CARE
Problem: Postpartum ( Delivery) (Adult)  Goal: Signs and Symptoms of Listed Potential Problems Will be Absent or Manageable (Postpartum)  Signs and symptoms of listed potential problems will be absent or manageable by discharge/transition of care (reference Postpartum ( Delivery) (Adult) CPG).   Outcome: Improving  Data: POD2. Fundus firm, @ U/U. Scant lochia. Vitals stable. Ambulating well, voiding without difficulty.   Incision: open to air-steri strips intact  Tolerating diet, BM no, Flatus yes  Pain controlled with Ibuprofen and Tylenol  Breastfeeding well and independent. Pumping at times per pts request.  No emotional concerns.   SO helping out with infant cares and supportive to patient.

## 2017-05-16 NOTE — PLAN OF CARE
Problem: Goal Outcome Summary  Goal: Goal Outcome Summary  Outcome: Improving  Patient's vital signs, fundal check, and lochia WDL. Patient reports pain is well controlled with medication, has slight abdominal/insicional pain. Patient moving slowly and voiding without difficulty. Patient is breastfeeding infant well with minimal assistance. Ensured deep latch and encouraged breast massage and hand expression of breast milk. Bonding well with infant.Family at bedside and supportive. Continue with care plan.

## 2017-05-17 VITALS
RESPIRATION RATE: 16 BRPM | HEART RATE: 77 BPM | TEMPERATURE: 97.7 F | SYSTOLIC BLOOD PRESSURE: 113 MMHG | OXYGEN SATURATION: 96 % | DIASTOLIC BLOOD PRESSURE: 76 MMHG

## 2017-05-17 PROCEDURE — 25000132 ZZH RX MED GY IP 250 OP 250 PS 637: Performed by: OBSTETRICS & GYNECOLOGY

## 2017-05-17 RX ADMIN — SENNOSIDES AND DOCUSATE SODIUM 2 TABLET: 8.6; 5 TABLET ORAL at 07:36

## 2017-05-17 RX ADMIN — IBUPROFEN 800 MG: 400 TABLET ORAL at 02:54

## 2017-05-17 RX ADMIN — SIMETHICONE CHEW TAB 80 MG 80 MG: 80 TABLET ORAL at 07:36

## 2017-05-17 RX ADMIN — IBUPROFEN 800 MG: 400 TABLET ORAL at 07:36

## 2017-05-17 RX ADMIN — ACETAMINOPHEN 650 MG: 325 TABLET, FILM COATED ORAL at 07:36

## 2017-05-17 NOTE — PLAN OF CARE
Problem: Goal Outcome Summary  Goal: Goal Outcome Summary  Outcome: Improving  Data: Vital signs within normal limits. Postpartum checks within normal limits - see flow record. Patient eating and drinking normally. Patient able to empty bladder independently and is up ambulating. No apparent signs of infection. Incision healing well. Patient performing self cares and is able to care for infant.  Action: Patient medicated during the shift for pain. See MAR. Patient reassessed within 1 hour after each medication and pain was improved - patient stated she was comfortable. Patient education done about formula feeding. See flow record.  Response: Positive attachment behaviors observed with infant. Support persons  present.   Plan: Anticipate discharge on Wednesday May 17, 2017.

## 2017-05-17 NOTE — PLAN OF CARE
Problem: Goal Outcome Summary  Goal: Goal Outcome Summary  Outcome: Improving  . Data: Vital signs stable, assessments within normal limits. Discharge instruction given and instructed of signs/symptoms to look for and report per discharge instructions.   Discharge outcomes on care plan met. No apparent pain. Breast pump given.   Action: Review of care plan, teaching, and discharge instructions done with patient. verification with signature obtained.   Response: patient  states understanding and comfort with self cares. All questions about self care addressed. patient discharged with infant  at 1100

## 2017-05-17 NOTE — PLAN OF CARE
Problem: Goal Outcome Summary  Goal: Goal Outcome Summary  Outcome: Improving  Data: Vital signs within normal limits. Postpartum checks within normal limits - see flow record. Patient eating and drinking normally. Patient able to empty bladder independently and is up ambulating. No apparent signs of infection. Incision healing well. Patient performing self cares and is able to care for infant.  Action: Patient medicated during the shift for pain. See MAR. Patient reassessed within 1 hour after each medication and pain was improved - patient stated she was comfortable. Patient education done about discharge. See flow record.  Response: Positive attachment behaviors observed with infant. Support persons Bebeto present.   Plan: Anticipate discharge later today.

## 2017-05-22 ENCOUNTER — TELEPHONE (OUTPATIENT)
Dept: OBGYN | Facility: CLINIC | Age: 37
End: 2017-05-22

## 2017-05-22 DIAGNOSIS — Z98.891 S/P CESAREAN SECTION: ICD-10-CM

## 2017-05-22 RX ORDER — OXYCODONE HYDROCHLORIDE 5 MG/1
5 TABLET ORAL
Qty: 25 TABLET | Refills: 0 | Status: SHIPPED | OUTPATIENT
Start: 2017-05-22 | End: 2017-05-26

## 2017-05-22 NOTE — TELEPHONE ENCOUNTER
Pt is calling to request additional oxycodone.  She is taking ibuprofen 600mg q6h, tylenol 650mg q4h.  Was given 15 tablets of oxycodone at discharge after her c/s and she is out.  (Discharged 5/14/17)  Would you be willing to give her a refill of oxycodone?  Jessica Pimentel RN

## 2017-05-24 ENCOUNTER — TELEPHONE (OUTPATIENT)
Dept: FAMILY MEDICINE | Facility: CLINIC | Age: 37
End: 2017-05-24

## 2017-05-24 NOTE — TELEPHONE ENCOUNTER
..Reason for Call:    looking for lactation specialist    Detailed comments: please advise    Phone Number Patient can be reached at: Home number on file [phone] (home)    Best Time: anytime    Can we leave a detailed message on this number? YES    Call taken on 5/24/2017 at 1:13 PM by Gianna Mahan

## 2017-05-26 ENCOUNTER — TELEPHONE (OUTPATIENT)
Dept: OBGYN | Facility: CLINIC | Age: 37
End: 2017-05-26

## 2017-05-26 ENCOUNTER — HOSPITAL ENCOUNTER (EMERGENCY)
Facility: CLINIC | Age: 37
Discharge: HOME | End: 2017-05-26
Attending: EMERGENCY MEDICINE | Admitting: EMERGENCY MEDICINE
Payer: COMMERCIAL

## 2017-05-26 VITALS
HEART RATE: 98 BPM | RESPIRATION RATE: 16 BRPM | TEMPERATURE: 98.1 F | DIASTOLIC BLOOD PRESSURE: 73 MMHG | SYSTOLIC BLOOD PRESSURE: 114 MMHG | OXYGEN SATURATION: 98 %

## 2017-05-26 DIAGNOSIS — T81.31XA: ICD-10-CM

## 2017-05-26 DIAGNOSIS — Z98.891 S/P CESAREAN SECTION: ICD-10-CM

## 2017-05-26 LAB
BASOPHILS # BLD AUTO: 0 10E9/L (ref 0–0.2)
BASOPHILS NFR BLD AUTO: 0.1 %
DIFFERENTIAL METHOD BLD: ABNORMAL
EOSINOPHIL # BLD AUTO: 0.3 10E9/L (ref 0–0.7)
EOSINOPHIL NFR BLD AUTO: 3.5 %
ERYTHROCYTE [DISTWIDTH] IN BLOOD BY AUTOMATED COUNT: 13.9 % (ref 10–15)
HCT VFR BLD AUTO: 28.1 % (ref 35–47)
HGB BLD-MCNC: 9.2 G/DL (ref 11.7–15.7)
IMM GRANULOCYTES # BLD: 0.2 10E9/L (ref 0–0.4)
IMM GRANULOCYTES NFR BLD: 1.7 %
LYMPHOCYTES # BLD AUTO: 1.3 10E9/L (ref 0.8–5.3)
LYMPHOCYTES NFR BLD AUTO: 14.1 %
MCH RBC QN AUTO: 28.7 PG (ref 26.5–33)
MCHC RBC AUTO-ENTMCNC: 32.7 G/DL (ref 31.5–36.5)
MCV RBC AUTO: 88 FL (ref 78–100)
MONOCYTES # BLD AUTO: 0.8 10E9/L (ref 0–1.3)
MONOCYTES NFR BLD AUTO: 8.8 %
NEUTROPHILS # BLD AUTO: 6.4 10E9/L (ref 1.6–8.3)
NEUTROPHILS NFR BLD AUTO: 71.8 %
NRBC # BLD AUTO: 0 10*3/UL
NRBC BLD AUTO-RTO: 0 /100
PLATELET # BLD AUTO: 691 10E9/L (ref 150–450)
RBC # BLD AUTO: 3.21 10E12/L (ref 3.8–5.2)
WBC # BLD AUTO: 8.9 10E9/L (ref 4–11)

## 2017-05-26 PROCEDURE — 99284 EMERGENCY DEPT VISIT MOD MDM: CPT | Mod: Z6 | Performed by: EMERGENCY MEDICINE

## 2017-05-26 PROCEDURE — 36415 COLL VENOUS BLD VENIPUNCTURE: CPT | Performed by: EMERGENCY MEDICINE

## 2017-05-26 PROCEDURE — 99283 EMERGENCY DEPT VISIT LOW MDM: CPT | Performed by: EMERGENCY MEDICINE

## 2017-05-26 PROCEDURE — 25000132 ZZH RX MED GY IP 250 OP 250 PS 637: Performed by: EMERGENCY MEDICINE

## 2017-05-26 PROCEDURE — 85025 COMPLETE CBC W/AUTO DIFF WBC: CPT | Performed by: EMERGENCY MEDICINE

## 2017-05-26 RX ORDER — IBUPROFEN 600 MG/1
600 TABLET, FILM COATED ORAL ONCE
Status: COMPLETED | OUTPATIENT
Start: 2017-05-26 | End: 2017-05-26

## 2017-05-26 RX ORDER — CEPHALEXIN 500 MG/1
500 CAPSULE ORAL 4 TIMES DAILY
Qty: 28 CAPSULE | Refills: 0 | Status: SHIPPED | OUTPATIENT
Start: 2017-05-26 | End: 2017-06-02

## 2017-05-26 RX ORDER — OXYCODONE HYDROCHLORIDE 5 MG/1
5 TABLET ORAL
Qty: 25 TABLET | Refills: 0 | Status: SHIPPED | OUTPATIENT
Start: 2017-05-26 | End: 2017-11-22

## 2017-05-26 RX ORDER — OXYCODONE HYDROCHLORIDE 5 MG/1
5 TABLET ORAL ONCE
Status: COMPLETED | OUTPATIENT
Start: 2017-05-26 | End: 2017-05-26

## 2017-05-26 RX ADMIN — OXYCODONE HYDROCHLORIDE 5 MG: 5 TABLET ORAL at 18:04

## 2017-05-26 RX ADMIN — IBUPROFEN 600 MG: 600 TABLET ORAL at 17:43

## 2017-05-26 ASSESSMENT — ENCOUNTER SYMPTOMS
WOUND: 1
FEVER: 0
COLOR CHANGE: 0

## 2017-05-26 NOTE — TELEPHONE ENCOUNTER
"Pt called in and stated that her incision has \"opened up\" and has yellow drainage from it, as well as increased pain in the area. No openings in the clinic today and due to the long weekend and the possibility of infection, the pt was informed that she should go to the ED to be seen. Pt was agreeable to this plan. Carolee Hernandez RN    "

## 2017-05-26 NOTE — ED AVS SNAPSHOT
[facility], Emergency Department    [address]    [facility] [place] [facility] [place] [place] [address]    Phone:  [phone]    Fax:  [phone]                                       Liam Ellis   MRN: [id number]    Department:  [facility], Emergency Department   Date of Visit:  5/26/2017           After Visit Summary Signature Page     I have received my discharge instructions, and my questions have been answered. I have discussed any challenges I see with this plan with the nurse or doctor.    ..........................................................................................................................................  Patient/Patient Representative Signature      ..........................................................................................................................................  Patient Representative Print Name and Relationship to Patient    ..................................................               ................................................  Date                                            Time    ..........................................................................................................................................  Reviewed by Signature/Title    ...................................................              ..............................................  Date                                                            Time

## 2017-05-26 NOTE — ED AVS SNAPSHOT
[facility], Emergency Department    [address]    [place] [address]    Phone:  [phone]    Fax:  [phone]                                       Liam Ellis   MRN: [id number]    Department:  [facility], Emergency Department   Date of Visit:  2017           Patient Information     Date Of Birth          1980        Your diagnoses for this visit were:     External incisional dehiscence     S/P  section        You were seen by Trip Tay MD.        Discharge Instructions       Please make an appointment to follow up with your ObGyn provider on Tuesday.    Keflex as directed.    Ibuprofen and/or oxycodone for pain.    Return to the emergency Department for any problems.     24 Hour Appointment Hotline       To make an appointment at any [facility] clinic, call [phone] ([phone]). If you don't have a family doctor or clinic, we will help you find one. [facility] clinics are conveniently located to serve the needs of you and your family.             Review of your medicines      START taking        Dose / Directions Last dose taken    cephALEXin 500 MG capsule   Commonly known as:  KEFLEX   Dose:  500 mg   Quantity:  28 capsule        Take 1 capsule (500 mg) by mouth 4 times daily for 7 days   Refills:  0          Our records show that you are taking the medicines listed below. If these are incorrect, please call your family doctor or clinic.        Dose / Directions Last dose taken    acetaminophen 325 MG tablet   Commonly known as:  TYLENOL   Dose:  650 mg   Quantity:  60 tablet        Take 2 tablets (650 mg) by mouth every 4 hours as needed for other (surgical pain)   Refills:  0        ibuprofen 600 MG tablet   Commonly known as:  ADVIL/MOTRIN   Dose:  600 mg   Quantity:  60 tablet        Take 1 tablet (600 mg) by mouth every 6 hours as needed for other (cramping)   Refills:  0        oxyCODONE 5 MG IR tablet   Commonly known as:  ROXICODONE   Dose:   5 mg   Quantity:  25 tablet        Take 1 tablet (5 mg) by mouth every 3 hours as needed for moderate to severe pain   Refills:  0        prenatal multivitamin  plus iron 27-0.8 MG Tabs per tablet   Dose:  1 tablet        Take 1 tablet by mouth daily   Refills:  0        senna-docusate 8.6-50 MG per tablet   Commonly known as:  SENOKOT-S;PERICOLACE   Dose:  1-2 tablet   Quantity:  100 tablet        Take 1-2 tablets by mouth 2 times daily   Refills:  0                Prescriptions were sent or printed at these locations (2 Prescriptions)                   Other Prescriptions                Printed at Department/Unit printer (2 of 2)         cephALEXin (KEFLEX) 500 MG capsule               oxyCODONE (ROXICODONE) 5 MG IR tablet                Procedures and tests performed during your visit     CBC with platelets differential      Orders Needing Specimen Collection     None      Pending Results     No orders found from 5/24/2017 to 5/27/2017.            Pending Culture Results     No orders found from 5/24/2017 to 5/27/2017.            Pending Results Instructions     If you had any lab results that were not finalized at the time of your Discharge, you can call the ED Lab Result RN at [phone]. You will be contacted by this team for any positive Lab results or changes in treatment. The nurses are available 7 days a week from 10A to 6:30P.  You can leave a message 24 hours per day and they will return your call.        Thank you for choosing [facility]       Thank you for choosing [facility] for your care. Our goal is always to provide you with excellent care. Hearing back from our patients is one way we can continue to improve our services. Please take a few minutes to complete the written survey that you may receive in the mail after you visit with us. Thank you!        [employer] Information     [employer] gives you secure access to your electronic health record. If you see a primary care provider, you can also send  messages to your care team and make appointments. If you have questions, please call your primary care clinic.  If you do not have a primary care provider, please call [phone] and they will assist you.        Care EveryWhere ID     This is your Care EveryWhere ID. This could be used by other organizations to access your [facility] medical records  [id number]        After Visit Summary       This is your record. Keep this with you and show to your community pharmacist(s) and doctor(s) at your next visit.

## 2017-05-26 NOTE — DISCHARGE INSTRUCTIONS
Please make an appointment to follow up with your ObGyn provider on Tuesday.    Keflex as directed.    Ibuprofen and/or oxycodone for pain.    Return to the emergency Department for any problems.

## 2017-05-26 NOTE — ED PROVIDER NOTES
History     Chief Complaint   Patient presents with     open incision      on 2017, noticed bleeding at left abdominal incision site and open incision.     ALBIN Ellis is a 37 year old female who is status-post  delivery on 17 who presents to the emergency department today with complaints of wound dehiscence. Patient reports that her incision started to open up today and she noted some yellowish discharge from the incision. She denies any increased pain or erythema. No noted fevers. No other current complaints.     I have reviewed the Medications, Allergies, Past Medical and Surgical History, and Social History in the [facility] system.    Past Medical History:   Diagnosis Date     Diabetes (H)      TMJ (temporomandibular joint syndrome) 12/10/2013       Past Surgical History:   Procedure Laterality Date     APPENDECTOMY        SECTION N/A 2017    Procedure:  SECTION;;  Surgeon: Tracy Bowser MD;  Location:  L+D       Family History   Problem Relation Age of Onset     Hypertension Mother      DIABETES Mother        Social History   Substance Use Topics     Smoking status: Never Smoker     Smokeless tobacco: Never Used     Alcohol use No     No current facility-administered medications for this encounter.      Current Outpatient Prescriptions   Medication     cephALEXin (KEFLEX) 500 MG capsule     oxyCODONE (ROXICODONE) 5 MG IR tablet     acetaminophen (TYLENOL) 325 MG tablet     ibuprofen (ADVIL/MOTRIN) 600 MG tablet     senna-docusate (SENOKOT-S;PERICOLACE) 8.6-50 MG per tablet     Prenatal Vit-Fe Fumarate-FA (PRENATAL MULTIVITAMIN  PLUS IRON) 27-0.8 MG TABS      No Known Allergies    Review of Systems   Constitutional: Negative for fever.   Skin: Positive for wound (would dehiscence). Negative for color change.   All other systems reviewed and are negative.      Physical Exam   BP: 101/71  Pulse: 101  Temp: 98  F (36.7  C)  SpO2: 96 %  Physical Exam    Constitutional: She is oriented to person, place, and time. Vital signs are normal. She appears well-developed and well-nourished.  Non-toxic appearance. She does not appear ill. No distress.   HENT:   Head: Normocephalic and atraumatic.   Mouth/Throat: Oropharynx is clear and moist. No oropharyngeal exudate.   Eyes: Conjunctivae and EOM are normal. Pupils are equal, round, and reactive to light. No scleral icterus.   Neck: Normal range of motion. Neck supple. No JVD present. No tracheal deviation present. No thyromegaly present.   Cardiovascular: Normal rate, regular rhythm, normal heart sounds and intact distal pulses.  Exam reveals no gallop and no friction rub.    No murmur heard.  Pulmonary/Chest: Effort normal and breath sounds normal. No respiratory distress.   Abdominal: Soft. Bowel sounds are normal. She exhibits no distension and no mass. There is no tenderness. There is no rigidity, no rebound and no guarding.       Musculoskeletal: Normal range of motion. She exhibits no edema or tenderness.   Lymphadenopathy:     She has no cervical adenopathy.   Neurological: She is alert and oriented to person, place, and time. She has normal strength. No cranial nerve deficit or sensory deficit.   Skin: Skin is warm and dry. No rash noted. No erythema. No pallor.   Psychiatric: She has a normal mood and affect. Her behavior is normal.   Nursing note and vitals reviewed.      ED Course     ED Course     Procedures   5:09 PM  The patient was seen and examined by Dr. Tay in Room ED06.                Assessments & Plan (with Medical Decision Making)   This patient presented to the emergency department due to open draining incision from her .  She is afebrile and vital signs are within normal limits.  No significant erythema and the drainage appears serous.  Clinically, this is most consistent with a seroma with incisional dehiscence.  I did consult with Gynecology, and she was seen in the emergency  department where her wound was probed and fascia was intact.  She will be started on Keflex and will follow-up in the clinic.  They did request a CBC sent from the emergency department so they could follow up in the clinic on Tuesday with another CBC.  She was then discharged in good condition.    This part of the medical record was transcribed by Malcolm Arceo Medical Scribe, from a dictation done by Trip Tay MD.      I have reviewed the nursing notes.    I have reviewed the findings, diagnosis, plan and need for follow up with the patient.    New Prescriptions    CEPHALEXIN (KEFLEX) 500 MG CAPSULE    Take 1 capsule (500 mg) by mouth 4 times daily for 7 days       Final diagnoses:   External incisional dehiscence   I, Anna Byrnes, am serving as a trained medical scribe to document services personally performed by Trip Tay MD, based on the provider's statements to me.      ITrip MD, was physically present and have reviewed and verified the accuracy of this note documented by Anna Byrnes.       5/26/2017   [facility], [place], EMERGENCY DEPARTMENT     Trip Tay MD  05/26/17 4835

## 2017-05-26 NOTE — CONSULTS
Ob/Gyn Consult Note    Patient Summary:  Liam Ellis is a 37 year old female seen at the request of the emergency department, Dr. Tay    HPI: Ms. Ellis is a 36 yo  who is POD#12 s/p PLTCS presenting to the emergency department for leaking from her abdominal incision.  The patient reports that she has been taking oxycodone, one tablet q3h and ibuprofen 600mg q6h.  Today, the patient's incision began leaking clear yellow fluid.  She called the clinic, but was unable to schedule an appt and was told to present to the ER.  The patient has otherwise been doing well, she denies fevers, chills, shortness of breath, or chest pain, and has not been doing any heavy lifting.      ROS: As per HPI     PMH:   Past Medical History:   Diagnosis Date     Diabetes (H)      TMJ (temporomandibular joint syndrome) 12/10/2013       PSHx:   Past Surgical History:   Procedure Laterality Date     APPENDECTOMY        SECTION N/A 2017    Procedure:  SECTION;;  Surgeon: Tracy Bowser MD;  Location:  L+D       Medications:   Current Outpatient Prescriptions   Medication     oxyCODONE (ROXICODONE) 5 MG IR tablet     acetaminophen (TYLENOL) 325 MG tablet     ibuprofen (ADVIL/MOTRIN) 600 MG tablet     senna-docusate (SENOKOT-S;PERICOLACE) 8.6-50 MG per tablet     Prenatal Vit-Fe Fumarate-FA (PRENATAL MULTIVITAMIN  PLUS IRON) 27-0.8 MG TABS     Allergies:    No Known Allergies    Social History:   Social History     Social History     Marital status:      Spouse name: N/A     Number of children: N/A     Years of education: N/A     Occupational History     Not on file.     Social History Main Topics     Smoking status: Never Smoker     Smokeless tobacco: Never Used     Alcohol use No     Drug use: No     Sexual activity: Yes     Partners: Male     Other Topics Concern     Parent/Sibling W/ Cabg, Mi Or Angioplasty Before 65f 55m? No     Social History Narrative    Caffeine intake/servings daily - 0     Calcium intake/servings daily - 3    Exercise 5 times weekly - describe ; walks    Sunscreen used - Yes    Seatbelts used - Yes    Guns stored in the home - No    Self Breast Exam - Yes    Pap test up to date -  Yes    Eye exam up to date -  Yes    Dental exam up to date -  Yes    DEXA scan up to date -  No    Flex Sig/Colonoscopy up to date -  No    Mammography up to date -  No    Immunizations reviewed and up to date - No    Abuse: Current or Past (Physical, Sexual or Emotional) - No    Do you feel safe in your environment - Yes    Do you cope well with stress - Yes    Do you suffer from insomnia - No    Last updated by: Ana Rosa Hamilton  10/4/2016             Social History     Social History     Marital status:      Spouse name: N/A     Number of children: N/A     Years of education: N/A     Social History Main Topics     Smoking status: Never Smoker     Smokeless tobacco: Never Used     Alcohol use No     Drug use: No     Sexual activity: Yes     Partners: Male     Other Topics Concern     Parent/Sibling W/ Cabg, Mi Or Angioplasty Before 65f 55m? No     Social History Narrative    Caffeine intake/servings daily - 0    Calcium intake/servings daily - 3    Exercise 5 times weekly - describe ; walks    Sunscreen used - Yes    Seatbelts used - Yes    Guns stored in the home - No    Self Breast Exam - Yes    Pap test up to date -  Yes    Eye exam up to date -  Yes    Dental exam up to date -  Yes    DEXA scan up to date -  No    Flex Sig/Colonoscopy up to date -  No    Mammography up to date -  No    Immunizations reviewed and up to date - No    Abuse: Current or Past (Physical, Sexual or Emotional) - No    Do you feel safe in your environment - Yes    Do you cope well with stress - Yes    Do you suffer from insomnia - No    Last updated by: Ana Rosa Hamilton  10/4/2016               Physical Exam:   Vitals:    05/26/17 1707   BP: 101/71   Pulse: 101   Temp: 98  F (36.7  C)   SpO2: 96%       Gen: AxOx4, in NAD at rest, moderately distressed with probing of wound   CV: RRR, no murmurs/rubs/gallops  Pulm: CTAB, no increased work of breathing  Abd: tender to palpation over incision  Incision: Pfannenstiel skin incision with slight erythema surrounding incision, steri strips removed, induration around incision, R>L.  Left side of incision has an appx 1cm opening with serous fluid seeping out.  Probed with a q-tip approximately 1cm around, no defect of the fascia, incision otherwise in tact.    Extremities: non-tender, no erythema; trace edema    Labs:  No labs    A&P: Ms. Ellis is a 38 yo old POD#12 s/p PLTCS presenting to the ER with leakage of serous fluid from her incision, likely due to a seroma.      1. Seroma  - Afebrile, VSS  - Cephalexin  - Follow-up in 2 weeks  - Refill oxycodone   - No signs/symptoms of infection  - Fluid appears serosanguinous     Pt discussed with Dr. Trenton Stroud MD   Ob/Gyn, PGY-1  5/26/2017 5:46 PM     Physician Attestation   I, Nan Chowdhury, saw this patient with the resident and agree with the resident s findings and plan of care as documented in the resident s note.      I personally reviewed vital signs, medications, labs and exam.    Key findings: Mild erythema/induration about 1 cm surrounding incision. Patient had a lot of edema at time of delivery. Likely seroma. No signs of continued fluid entrapment, at least a large quantity, on exam. Will get CBC in case it needs trending. Recommend antibiotics for possible cellulitis and close FU in clinic, Tuesday when clinic re-opens. Discussed call-for precautions and reasons to return to ED (worsening instead of improving).     Nan Chowdhury  Date of Service (when I saw the patient): 05/26/17

## 2017-05-30 ENCOUNTER — OFFICE VISIT (OUTPATIENT)
Dept: OBGYN | Facility: CLINIC | Age: 37
End: 2017-05-30
Payer: COMMERCIAL

## 2017-05-30 ENCOUNTER — TELEPHONE (OUTPATIENT)
Dept: OBGYN | Facility: CLINIC | Age: 37
End: 2017-05-30

## 2017-05-30 VITALS
DIASTOLIC BLOOD PRESSURE: 60 MMHG | TEMPERATURE: 97.7 F | HEART RATE: 76 BPM | HEIGHT: 62 IN | SYSTOLIC BLOOD PRESSURE: 85 MMHG | BODY MASS INDEX: 24.2 KG/M2 | WEIGHT: 131.5 LBS

## 2017-05-30 DIAGNOSIS — T14.8XXA WOUND DRAINAGE: Primary | ICD-10-CM

## 2017-05-30 PROCEDURE — 99024 POSTOP FOLLOW-UP VISIT: CPT | Performed by: OBSTETRICS & GYNECOLOGY

## 2017-05-30 NOTE — TELEPHONE ENCOUNTER
Pt states that she was seen in ED over weekend, but was told to follow up today with Dr. Chowdhury today to look at her incision site.     Pt would like a call back    Pt can be reached @ [phone] allyssa

## 2017-05-30 NOTE — NURSING NOTE
"Chief Complaint   Patient presents with     Surgical Followup     follow up ER. feeling better since friday.       Initial BP (!) 85/60  Pulse 76  Temp 97.7  F (36.5  C) (Oral)  Ht 5' 2\" (1.575 m)  Wt 131 lb 8 oz (59.6 kg)  LMP 2016  Breastfeeding? Yes  BMI 24.05 kg/m2 Estimated body mass index is 24.05 kg/(m^2) as calculated from the following:    Height as of this encounter: 5' 2\" (1.575 m).    Weight as of this encounter: 131 lb 8 oz (59.6 kg).  BP completed using cuff size: regular        The following HM Due: NONE      The following patient reported/Care Every where data was sent to:  P ABSTRACT QUALITY INITIATIVES [[id number]]       patient has appointment for today    Marni Sharp CMA               "

## 2017-05-30 NOTE — MR AVS SNAPSHOT
After Visit Summary   5/30/2017    Liam Ellis    MRN: [id number]           Patient Information     Date Of Birth          1980        Visit Information        Provider Department      5/30/2017 1:15 PM Nan Chowdhury MD [facility]        Today's Diagnoses     Wound drainage    -  1       Follow-ups after your visit        Your next 10 appointments already scheduled     Jun 08, 2017  3:30 PM CDT   SHORT with Tracy Bowser MD   [facility] ([facility]    [address] [place] [id number] [phone]              Who to contact     If you have questions or need follow up information about today's clinic visit or your schedule please contact [facility] directly at [phone].  Normal or non-critical lab and imaging results will be communicated to you by MyChart, letter or phone within 4 business days after the clinic has received the results. If you do not hear from us within 7 days, please contact the clinic through MyChart or phone. If you have a critical or abnormal lab result, we will notify you by phone as soon as possible.  Submit refill requests through [employer] or call your pharmacy and they will forward the refill request to us. Please allow 3 business days for your refill to be completed.          Additional Information About Your Visit        MyChart Information     [employer] gives you secure access to your electronic health record. If you see a primary care provider, you can also send messages to your care team and make appointments. If you have questions, please call your primary care clinic.  If you do not have a primary care provider, please call [phone] and they will assist you.        Care EveryWhere ID     This is your Care EveryWhere ID. This could be used by other organizations to access your [place] medical records  [id number]        Your Vitals Were     Pulse  "Temperature Height Last Period Breastfeeding? BMI (Body Mass Index)    76 97.7  F (36.5  C) (Oral) 5' 2\" (1.575 m) 08/12/2016 Yes 24.05 kg/m2       Blood Pressure from Last 3 Encounters:   05/30/17 (!) 85/60   05/26/17 114/73   05/17/17 113/76    Weight from Last 3 Encounters:   05/30/17 131 lb 8 oz (59.6 kg)   05/05/17 147 lb 3.2 oz (66.8 kg)   05/02/17 146 lb 14.4 oz (66.6 kg)              Today, you had the following     No orders found for display       Primary Care Provider Office Phone # Fax #    ELLEN Stanford [phone]       [facility] [address]  [facility] [address]        Thank you!     Thank you for choosing [facility]  for your care. Our goal is always to provide you with excellent care. Hearing back from our patients is one way we can continue to improve our services. Please take a few minutes to complete the written survey that you may receive in the mail after your visit with us. Thank you!             Your Updated Medication List - Protect others around you: Learn how to safely use, store and throw away your medicines at www.disposemymeds.org.          This list is accurate as of: 5/30/17 11:59 PM.  Always use your most recent med list.                   Brand Name Dispense Instructions for use    acetaminophen 325 MG tablet    TYLENOL    60 tablet    Take 2 tablets (650 mg) by mouth every 4 hours as needed for other (surgical pain)       cephALEXin 500 MG capsule    KEFLEX    28 capsule    Take 1 capsule (500 mg) by mouth 4 times daily for 7 days       ibuprofen 600 MG tablet    ADVIL/MOTRIN    60 tablet    Take 1 tablet (600 mg) by mouth every 6 hours as needed for other (cramping)       oxyCODONE 5 MG IR tablet    ROXICODONE    25 tablet    Take 1 tablet (5 mg) by mouth every 3 hours as needed for moderate to severe pain       prenatal multivitamin  plus iron 27-0.8 MG Tabs per tablet      Take 1 tablet by mouth daily       senna-docusate " 8.6-50 MG per tablet    SENOKOT-S;PERICOLACE    100 tablet    Take 1-2 tablets by mouth 2 times daily

## 2017-06-06 NOTE — PROGRESS NOTES
"S:  Liam presents for f/u from ER on .   Had some wound drainage at that time and mild cellulitis surrounding incision, was started on keflex.   Delivery complicated by round ligament hematoma during , long labor.   Doing much better since ER visit.  No fevers. Pain control improved. Mobility improved.  No further drainage.     O:  Vitals:    17 1336   BP: (!) 85/60   Pulse: 76   Temp: 97.7  F (36.5  C)   TempSrc: Oral   Weight: 131 lb 8 oz (59.6 kg)   Height: 5' 2\" (1.575 m)     Gen: well appearing  Abd: soft, NT  Skin: Mild erythema, 1 cm surrounding incision. Not open. No fluctuance or palpable collections.    A/P:  37 year old  with cellulitis, likely seroma after  delivery  - continue kelfex until completed  - reviewed precautions  - rtc for wound check one week.  "

## 2017-06-08 ENCOUNTER — OFFICE VISIT (OUTPATIENT)
Dept: OBGYN | Facility: CLINIC | Age: 37
End: 2017-06-08
Payer: COMMERCIAL

## 2017-06-08 VITALS
WEIGHT: 126 LBS | HEART RATE: 86 BPM | DIASTOLIC BLOOD PRESSURE: 71 MMHG | SYSTOLIC BLOOD PRESSURE: 98 MMHG | BODY MASS INDEX: 23.05 KG/M2 | OXYGEN SATURATION: 99 % | TEMPERATURE: 97.4 F

## 2017-06-08 DIAGNOSIS — T14.8XXA LOCAL INFECTION OF WOUND: Primary | ICD-10-CM

## 2017-06-08 DIAGNOSIS — L08.9 LOCAL INFECTION OF WOUND: Primary | ICD-10-CM

## 2017-06-08 PROCEDURE — 99024 POSTOP FOLLOW-UP VISIT: CPT | Performed by: OBSTETRICS & GYNECOLOGY

## 2017-06-08 NOTE — NURSING NOTE
"Chief Complaint   Patient presents with     RECHECK     check wound       Initial BP 98/71  Pulse 86  Temp 97.4  F (36.3  C)  Wt 126 lb (57.2 kg)  LMP 2016  SpO2 99%  BMI 23.05 kg/m2 Estimated body mass index is 23.05 kg/(m^2) as calculated from the following:    Height as of 17: 5' 2\" (1.575 m).    Weight as of this encounter: 126 lb (57.2 kg).  BP completed using cuff size: regular        The following HM Due: NONE      The following patient reported/Care Every where data was sent to:  P ABSTRACT QUALITY INITIATIVES [[id number]]  none     n/a             "

## 2017-06-08 NOTE — PROGRESS NOTES
Liam Ellis is a 37 year old  who presents to assess incision.  She is s/p primary C/S 17, prolonged labor, second stage arrest, surgery complicated by laceration of infundibulopelvic vessel requiring suture ligation of right fallopian tube to repair.  See operative note.  She was treated with Keflex for wound infection.  Reports no fevers/chills/wound pain/drainage from wound.     OBJECTIVE: BP 98/71  Pulse 86  Temp 97.4  F (36.3  C)  Wt 126 lb (57.2 kg)  LMP 2016  SpO2 99%  BMI 23.05 kg/m2     Abdomen soft, non-tender.  Wound healing well, skin edges intact, no erythema/drainiage/tenderness.  Slight induration at the right margin.     ASSESSMENT: Wound well-healed.      PLAN:  Routine care, call prn.  RTC for 6 week exam.  Discussed surgery, would consider HSG at about 6 months post partum, she will call if she wishes to arrange.

## 2017-06-08 NOTE — MR AVS SNAPSHOT
After Visit Summary   6/8/2017    Liam Ellis    MRN: [id number]           Patient Information     Date Of Birth          1980        Visit Information        Provider Department      6/8/2017 3:30 PM Tracy Bowser MD [facility]        Today's Diagnoses     Local infection of wound    -  1       Follow-ups after your visit        Who to contact     If you have questions or need follow up information about today's clinic visit or your schedule please contact [facility] directly at [phone].  Normal or non-critical lab and imaging results will be communicated to you by MyChart, letter or phone within 4 business days after the clinic has received the results. If you do not hear from us within 7 days, please contact the clinic through [employer] or phone. If you have a critical or abnormal lab result, we will notify you by phone as soon as possible.  Submit refill requests through [employer] or call your pharmacy and they will forward the refill request to us. Please allow 3 business days for your refill to be completed.          Additional Information About Your Visit        MyChart Information     [employer] gives you secure access to your electronic health record. If you see a primary care provider, you can also send messages to your care team and make appointments. If you have questions, please call your primary care clinic.  If you do not have a primary care provider, please call [phone] and they will assist you.        Care EveryWhere ID     This is your Care EveryWhere ID. This could be used by other organizations to access your [place] medical records  [id number]        Your Vitals Were     Pulse Temperature Last Period Pulse Oximetry BMI (Body Mass Index)       86 97.4  F (36.3  C) 08/12/2016 99% 23.05 kg/m2        Blood Pressure from Last 3 Encounters:   06/08/17 98/71   05/30/17 (!) 85/60   05/26/17 114/73    Weight from Last 3 Encounters:   06/08/17  126 lb (57.2 kg)   05/30/17 131 lb 8 oz (59.6 kg)   05/05/17 147 lb 3.2 oz (66.8 kg)              Today, you had the following     No orders found for display       Primary Care Provider Office Phone # Fax #    ELLEN Stanford [phone]       [facility] [address]  [facility] [address]        Thank you!     Thank you for choosing [facility]  for your care. Our goal is always to provide you with excellent care. Hearing back from our patients is one way we can continue to improve our services. Please take a few minutes to complete the written survey that you may receive in the mail after your visit with us. Thank you!             Your Updated Medication List - Protect others around you: Learn how to safely use, store and throw away your medicines at www.disposemymeds.org.          This list is accurate as of: 6/8/17  4:05 PM.  Always use your most recent med list.                   Brand Name Dispense Instructions for use    acetaminophen 325 MG tablet    TYLENOL    60 tablet    Take 2 tablets (650 mg) by mouth every 4 hours as needed for other (surgical pain)       ibuprofen 600 MG tablet    ADVIL/MOTRIN    60 tablet    Take 1 tablet (600 mg) by mouth every 6 hours as needed for other (cramping)       oxyCODONE 5 MG IR tablet    ROXICODONE    25 tablet    Take 1 tablet (5 mg) by mouth every 3 hours as needed for moderate to severe pain       prenatal multivitamin  plus iron 27-0.8 MG Tabs per tablet      Take 1 tablet by mouth daily       senna-docusate 8.6-50 MG per tablet    SENOKOT-S;PERICOLACE    100 tablet    Take 1-2 tablets by mouth 2 times daily

## 2017-06-26 LAB — COPATH REPORT: NORMAL

## 2017-07-21 ENCOUNTER — PRENATAL OFFICE VISIT (OUTPATIENT)
Dept: OBGYN | Facility: CLINIC | Age: 37
End: 2017-07-21
Payer: COMMERCIAL

## 2017-07-21 VITALS
SYSTOLIC BLOOD PRESSURE: 88 MMHG | HEART RATE: 77 BPM | DIASTOLIC BLOOD PRESSURE: 63 MMHG | WEIGHT: 124 LBS | TEMPERATURE: 98.7 F | BODY MASS INDEX: 22.68 KG/M2 | OXYGEN SATURATION: 100 %

## 2017-07-21 PROBLEM — Z98.891 S/P CESAREAN SECTION: Status: RESOLVED | Noted: 2017-05-14 | Resolved: 2017-07-21

## 2017-07-21 PROBLEM — O24.410 DIET CONTROLLED GESTATIONAL DIABETES MELLITUS (GDM) IN THIRD TRIMESTER: Status: RESOLVED | Noted: 2017-03-28 | Resolved: 2017-07-21

## 2017-07-21 PROBLEM — Z34.90 PREGNANCY, OBSTETRICAL CARE: Status: RESOLVED | Noted: 2017-05-12 | Resolved: 2017-07-21

## 2017-07-21 PROBLEM — O24.415 GESTATIONAL DIABETES MELLITUS (GDM) IN THIRD TRIMESTER CONTROLLED ON ORAL HYPOGLYCEMIC DRUG: Status: RESOLVED | Noted: 2017-04-19 | Resolved: 2017-07-21

## 2017-07-21 PROCEDURE — 99207 ZZC POST PARTUM EXAM: CPT | Performed by: OBSTETRICS & GYNECOLOGY

## 2017-07-21 NOTE — NURSING NOTE
"Chief Complaint   Patient presents with     Post Partum Exam       Initial BP (!) 88/63  Pulse 77  Temp 98.7  F (37.1  C)  Wt 124 lb (56.2 kg)  LMP 2016  SpO2 100%  Breastfeeding? No  BMI 22.68 kg/m2 Estimated body mass index is 22.68 kg/(m^2) as calculated from the following:    Height as of 17: 5' 2\" (1.575 m).    Weight as of this encounter: 124 lb (56.2 kg).  BP completed using cuff size: regular        The following HM Due: NONE      The following patient reported/Care Every where data was sent to:  P ABSTRACT QUALITY INITIATIVES [[id number]]  none     n/a             "

## 2017-07-21 NOTE — PROGRESS NOTES
Liam is here for a 6-week postpartum checkup.    She had a c/s for FTP of a viable boy, weight 6 pounds 1 oz., with extension and right broad ligament bleeding requiring stitch around right fallopian tube to ensure hemostasis.  Since delivery, she has not been breast feeding.  She has no signs of infection, bleeding or other complications.  She is not pregnant.  We discussed contraceptions and she is leaning toward nexplanon vs mirena IUD.  She denies feeling sad, depressed or too overwhelmed.    PHQ-9 SCORE 4/28/2017   Total Score 1          EXAM:    HEENT: grossly normal.  NECK: no lymphadenopathy or thyroidomegaly.  LUNGS: CTA X 2, no rales or crackles.  BACK: No spinal or CVA tenderness.  HEART: RRR without murmurs clicks or gallops.  ABDOMEN: soft, non tender, good bowel sounds, without masses rebound, guarding or tenderness. Incision well healed.    PELVIC:    deferred    EXTREMITIES: Warm to touch, good pulses, no ankle edema or calf tenderness.  NEUROLOGIC: grossly normal.    ASSESSMENT:   Normal 8-week postpartum exam after c/s for FTP.    PLAN:  Pap smear utd and we discussed nexplanon and mirena IUD for contraception.  She is unsure, leaning toward mirena.  Will RTC for placement.    MELIDA ANDINO MD

## 2017-07-21 NOTE — MR AVS SNAPSHOT
After Visit Summary   7/21/2017    Liam Ellis    MRN: [id number]           Patient Information     Date Of Birth          1980        Visit Information        Provider Department      7/21/2017 11:15 AM Shahida Raphael MD [facility]        Today's Diagnoses     Routine postpartum follow-up    -  1       Follow-ups after your visit        Who to contact     If you have questions or need follow up information about today's clinic visit or your schedule please contact [facility] directly at [phone].  Normal or non-critical lab and imaging results will be communicated to you by PiniOnhart, letter or phone within 4 business days after the clinic has received the results. If you do not hear from us within 7 days, please contact the clinic through ISISt or phone. If you have a critical or abnormal lab result, we will notify you by phone as soon as possible.  Submit refill requests through [employer] or call your pharmacy and they will forward the refill request to us. Please allow 3 business days for your refill to be completed.          Additional Information About Your Visit        MyChart Information     [employer] gives you secure access to your electronic health record. If you see a primary care provider, you can also send messages to your care team and make appointments. If you have questions, please call your primary care clinic.  If you do not have a primary care provider, please call [phone] and they will assist you.        Care EveryWhere ID     This is your Care EveryWhere ID. This could be used by other organizations to access your [place] medical records  [id number]        Your Vitals Were     Pulse Temperature Last Period Pulse Oximetry Breastfeeding? BMI (Body Mass Index)    77 98.7  F (37.1  C) 08/12/2016 100% No 22.68 kg/m2       Blood Pressure from Last 3 Encounters:   07/21/17 (!) 88/63   06/08/17 98/71   05/30/17 (!) 85/60    Weight from  Last 3 Encounters:   17 124 lb (56.2 kg)   17 126 lb (57.2 kg)   17 131 lb 8 oz (59.6 kg)              Today, you had the following     No orders found for display       Primary Care Provider Office Phone # Fax #    ELLEN Stanford [phone]       [facility] [address]  [place] [facility] [address]        Equal Access to Services     MILADYS DAVIES : Hadii aad ku hadasho Soomaali, waaxda luqadaha, qaybta kaalmada adeegyada, waxay idiin hayaan adeeg kharash la'juan carlos . So [facility] [phone].    ATENCIÓN: Si habla español, tiene a varela disposición servicios gratuitos de asistencia lingüística. [facility] [phone].    We comply with applicable federal civil rights laws and [place] laws. We do not discriminate on the basis of race, color, national origin, age, disability sex, sexual orientation or gender identity.            Thank you!     Thank you for choosing [facility]  for your care. Our goal is always to provide you with excellent care. Hearing back from our patients is one way we can continue to improve our services. Please take a few minutes to complete the written survey that you may receive in the mail after your visit with us. Thank you!             Your Updated Medication List - Protect others around you: Learn how to safely use, store and throw away your medicines at www.disposemymeds.org.          This list is accurate as of: 17 12:19 PM.  Always use your most recent med list.                   Brand Name Dispense Instructions for use Diagnosis    acetaminophen 325 MG tablet    TYLENOL    60 tablet    Take 2 tablets (650 mg) by mouth every 4 hours as needed for other (surgical pain)    S/P  section       ibuprofen 600 MG tablet    ADVIL/MOTRIN    60 tablet    Take 1 tablet (600 mg) by mouth every 6 hours as needed for other (cramping)    S/P  section       oxyCODONE 5 MG IR tablet    ROXICODONE    25 tablet    Take 1 tablet  (5 mg) by mouth every 3 hours as needed for moderate to severe pain    S/P  section       prenatal multivitamin  plus iron 27-0.8 MG Tabs per tablet      Take 1 tablet by mouth daily        senna-docusate 8.6-50 MG per tablet    SENOKOT-S;PERICOLACE    100 tablet    Take 1-2 tablets by mouth 2 times daily    S/P  section

## 2017-10-10 ENCOUNTER — OFFICE VISIT (OUTPATIENT)
Dept: OBGYN | Facility: CLINIC | Age: 37
End: 2017-10-10
Payer: COMMERCIAL

## 2017-10-10 VITALS
TEMPERATURE: 97.6 F | HEART RATE: 80 BPM | DIASTOLIC BLOOD PRESSURE: 65 MMHG | WEIGHT: 118 LBS | BODY MASS INDEX: 21.58 KG/M2 | SYSTOLIC BLOOD PRESSURE: 96 MMHG

## 2017-10-10 DIAGNOSIS — Z23 NEED FOR PROPHYLACTIC VACCINATION AND INOCULATION AGAINST INFLUENZA: ICD-10-CM

## 2017-10-10 DIAGNOSIS — Z30.430 ENCOUNTER FOR INSERTION OF INTRAUTERINE CONTRACEPTIVE DEVICE (IUD): Primary | ICD-10-CM

## 2017-10-10 LAB — BETA HCG QUAL IFA URINE: NEGATIVE

## 2017-10-10 PROCEDURE — 84703 CHORIONIC GONADOTROPIN ASSAY: CPT | Performed by: OBSTETRICS & GYNECOLOGY

## 2017-10-10 PROCEDURE — 90686 IIV4 VACC NO PRSV 0.5 ML IM: CPT | Performed by: OBSTETRICS & GYNECOLOGY

## 2017-10-10 PROCEDURE — 58300 INSERT INTRAUTERINE DEVICE: CPT | Performed by: OBSTETRICS & GYNECOLOGY

## 2017-10-10 PROCEDURE — 90471 IMMUNIZATION ADMIN: CPT | Performed by: OBSTETRICS & GYNECOLOGY

## 2017-10-10 NOTE — NURSING NOTE
"Chief Complaint   Patient presents with     IUD     Mirena. NDC: [id number] LOT: [id number] EXP: 2020       Initial BP 96/65  Pulse 80  Temp 97.6  F (36.4  C) (Oral)  Wt 118 lb (53.5 kg)  LMP 2017  BMI 21.58 kg/m2 Estimated body mass index is 21.58 kg/(m^2) as calculated from the following:    Height as of 17: 5' 2\" (1.575 m).    Weight as of this encounter: 118 lb (53.5 kg).  BP completed using cuff size: regular        The following HM Due: NONE      The following patient reported/Care Every where data was sent to:  P ABSTRACT QUALITY INITIATIVES [52481]  LAURA Guy          "

## 2017-10-10 NOTE — PROGRESS NOTES
Injectable Influenza Immunization Documentation    1.  Is the person to be vaccinated sick today?   No    2. Does the person to be vaccinated have an allergy to a component   of the vaccine?   No    3. Has the person to be vaccinated ever had a serious reaction   to influenza vaccine in the past?   No    4. Has the person to be vaccinated ever had Guillain-Barré syndrome?   No    Form completed by Narendra MAURER

## 2017-10-10 NOTE — PROGRESS NOTES
Injectable Influenza Immunization Documentation    1.  Is the person to be vaccinated sick today?   No    2. Does the person to be vaccinated have an allergy to a component   of the vaccine?   No    3. Has the person to be vaccinated ever had a serious reaction   to influenza vaccine in the past?   No    4. Has the person to be vaccinated ever had Guillain-Barré syndrome?   No    Form completed by Franki Guy

## 2017-10-10 NOTE — PROGRESS NOTES
GYN clinic visit  10/10/2017    CC: IUD insertion    HPI:   Liam Ellis is a 37 year old  who presents to clinic today for an IUD insertion.  She denies a history of STI and most recently had negative testing for GC/chlamydia in .  She denies current abnormal vaginal discharge.  Patient's last menstrual period was 2017..  Last Pap smear was 2015.     Reviewed GYN hx, OB hx, past medical hx, surgical hx and family hx.   Reviewed medications and allergies. Changes made in EPIC.     OBJECTIVE:  Vitals:    10/10/17 1358   BP: 96/65   Pulse: 80   Temp: 97.6  F (36.4  C)   TempSrc: Oral   Weight: 118 lb (53.5 kg)     Body mass index is 21.58 kg/(m^2).    Gen: alert, oriented, no distress, cooperative and pleasant  Abd: soft, nontender, nondistended, normoactive bowel sounds, no masses, well healed  scar and approximately 3cm long horizontal scar over RLQ  : normal external genitalia without lesions or abnormalities. Normal Bartholin's, normal [place]'s, normal urethra. Normal vaginal mucosa, no abnormal discharge or lesions.  Bimanual exam reveals 7 week sized retroverted mobile uterus, no cervical motion tenderness, no uterine tenderness, no adnexal masses.  Unable to visualized entire cervix, so bimanual exam was repeated.  Able to palpate cervical os anterior to portion of cervix visible on exam, but anterior lip appears flush to vaginal wall.  Portion of cervical that was visible was without lesions or erythema.     (Of note, patient denies any history of procedures on her cervix)    PROCEDURE:  Patient has verbalized understanding of risks and benefits. She was counseled on risks of infection, bleeding, uterine perforation, cervical laceration, expulsion, overall risk of pregnancy 2 in 1000, if she does get pregnant that there is an increased risk of ectopic pregnancy. All questions answered. She has signed the consent form.    Urine pregnancy test was negative.      A regular  Melissa speculum was placed in the vagina with good visualization of the cervix.  The cervix was then swabbed with a betadine x3.  Tenaculum was placed at the 6 o'clock position on the cervix and the uterus sounded to 7cm.  The Mirena  IUD was then placed in the usual fashion under sterile technique without difficulty.  Strings were clipped about 2-3 cm from the cervical os.  Tenaculum was removed and cervix was hemostatic. There were no complications. The patient tolerated the procedure well.      ASSESSMENT:   Liam Ellis is a 37 year old  who had a Mirena IUD inserted today without complication.    PLAN:  Offered STI screening today, but pt declined.  Anterior cervix flush with vaginal wall.  Patient denies history of any procedures on her cervix, such as a LEEP procedure.  Denies having been told her cervix look abnormal before.  Discussed this with patient so she is aware and able to tell healthcare providers in the future.  Desires flu shot--given    The patient should feel for the IUD strings in 2 weeks.  If unable to locate them, she should return to clinic for a speculum examination for confirmation that the IUD is in place. Bleeding pattern of this particular IUD was discussed with the patient. She is aware that the IUD will need to be removed in 5 years or PRN.  She is to return to clinic for her next annual or PRN.    Sonia Gomez MD

## 2017-10-10 NOTE — MR AVS SNAPSHOT
After Visit Summary   10/10/2017    Liam Ellis    MRN: [id number]           Patient Information     Date Of Birth          1980        Visit Information        Provider Department      10/10/2017 2:00 PM Sonia Gomez MD [facility]        Today's Diagnoses     Encounter for insertion of intrauterine contraceptive device (IUD)    -  1    Need for prophylactic vaccination and inoculation against influenza           Follow-ups after your visit        Who to contact     If you have questions or need follow up information about today's clinic visit or your schedule please contact [facility] directly at [phone].  Normal or non-critical lab and imaging results will be communicated to you by [employer], letter or phone within 4 business days after the clinic has received the results. If you do not hear from us within 7 days, please contact the clinic through [employer] or phone. If you have a critical or abnormal lab result, we will notify you by phone as soon as possible.  Submit refill requests through Spon[employer] or call your pharmacy and they will forward the refill request to us. Please allow 3 business days for your refill to be completed.          Additional Information About Your Visit        MyChart Information     [employer] gives you secure access to your electronic health record. If you see a primary care provider, you can also send messages to your care team and make appointments. If you have questions, please call your primary care clinic.  If you do not have a primary care provider, please call [phone] and they will assist you.        Care EveryWhere ID     This is your Care EveryWhere ID. This could be used by other organizations to access your [place] medical records  [id number]        Your Vitals Were     Pulse Temperature Last Period BMI (Body Mass Index)          80 97.6  F (36.4  C) (Oral) 09/30/2017 21.58 kg/m2         Blood Pressure from  Last 3 Encounters:   10/10/17 96/65   07/21/17 (!) 88/63   06/08/17 98/71    Weight from Last 3 Encounters:   10/10/17 118 lb (53.5 kg)   07/21/17 124 lb (56.2 kg)   06/08/17 126 lb (57.2 kg)              We Performed the Following     ADMIN INFLUENZA (For MEDICARE Patients ONLY) []     Beta HCG qual IFA urine - [facility] and [place]     FLU VAC, SPLIT VIRUS IM > 3 YO (QUADRIVALENT) [16900]     Vaccine Administration, Initial [91492]     Vaccine Administration, Initial [45122]        Primary Care Provider Office Phone # Fax #    Ana Rosa ELLEN Clarke [phone]       [facility] [address]  [place] [facility] [address]        Equal Access to Services     MILADYS DAVIES : Hadii aad ku hadasho Soomaali, waaxda luqadaha, qaybta kaalmada adeegyada, waxay tashi hayjuan carlos belcher . So [facility] [phone].    ATENCIÓN: Si habla español, tiene a varela disposición servicios gratuitos de asistencia lingüística. Alexandra al [phone].    We comply with applicable federal civil rights laws and [place] laws. We do not discriminate on the basis of race, color, national origin, age, disability, sex, sexual orientation, or gender identity.            Thank you!     Thank you for choosing [facility]  for your care. Our goal is always to provide you with excellent care. Hearing back from our patients is one way we can continue to improve our services. Please take a few minutes to complete the written survey that you may receive in the mail after your visit with us. Thank you!             Your Updated Medication List - Protect others around you: Learn how to safely use, store and throw away your medicines at www.disposemymeds.org.          This list is accurate as of: 10/10/17 11:59 PM.  Always use your most recent med list.                   Brand Name Dispense Instructions for use Diagnosis    acetaminophen 325 MG tablet    TYLENOL    60 tablet    Take 2 tablets (650 mg) by mouth  every 4 hours as needed for other (surgical pain)    S/P  section       ibuprofen 600 MG tablet    ADVIL/MOTRIN    60 tablet    Take 1 tablet (600 mg) by mouth every 6 hours as needed for other (cramping)    S/P  section       oxyCODONE 5 MG IR tablet    ROXICODONE    25 tablet    Take 1 tablet (5 mg) by mouth every 3 hours as needed for moderate to severe pain    S/P  section       prenatal multivitamin plus iron 27-0.8 MG Tabs per tablet      Take 1 tablet by mouth daily        senna-docusate 8.6-50 MG per tablet    SENOKOT-S;PERICOLACE    100 tablet    Take 1-2 tablets by mouth 2 times daily    S/P  section

## 2017-11-01 ENCOUNTER — NURSE TRIAGE (OUTPATIENT)
Dept: NURSING | Facility: CLINIC | Age: 37
End: 2017-11-01

## 2017-11-01 ENCOUNTER — OFFICE VISIT (OUTPATIENT)
Dept: FAMILY MEDICINE | Facility: CLINIC | Age: 37
End: 2017-11-01
Payer: COMMERCIAL

## 2017-11-01 VITALS
BODY MASS INDEX: 21.4 KG/M2 | OXYGEN SATURATION: 98 % | SYSTOLIC BLOOD PRESSURE: 108 MMHG | HEART RATE: 77 BPM | DIASTOLIC BLOOD PRESSURE: 74 MMHG | WEIGHT: 117 LBS

## 2017-11-01 DIAGNOSIS — R82.90 ABNORMAL URINALYSIS: ICD-10-CM

## 2017-11-01 DIAGNOSIS — B96.89 BACTERIAL VAGINAL INFECTION: ICD-10-CM

## 2017-11-01 DIAGNOSIS — N76.0 BACTERIAL VAGINAL INFECTION: ICD-10-CM

## 2017-11-01 DIAGNOSIS — R35.0 URINARY FREQUENCY: Primary | ICD-10-CM

## 2017-11-01 DIAGNOSIS — R10.2 PELVIC PAIN IN FEMALE: ICD-10-CM

## 2017-11-01 LAB
ALBUMIN UR-MCNC: NEGATIVE MG/DL
APPEARANCE UR: ABNORMAL
BACTERIA #/AREA URNS HPF: ABNORMAL /HPF
BETA HCG QUAL IFA URINE: NEGATIVE
BILIRUB UR QL STRIP: NEGATIVE
COLOR UR AUTO: YELLOW
GLUCOSE UR STRIP-MCNC: NEGATIVE MG/DL
HGB UR QL STRIP: ABNORMAL
KETONES UR STRIP-MCNC: NEGATIVE MG/DL
LEUKOCYTE ESTERASE UR QL STRIP: ABNORMAL
NITRATE UR QL: NEGATIVE
NON-SQ EPI CELLS #/AREA URNS LPF: ABNORMAL /LPF
PH UR STRIP: 7 PH (ref 5–7)
RBC #/AREA URNS AUTO: ABNORMAL /HPF
SOURCE: ABNORMAL
SP GR UR STRIP: 1.01 (ref 1–1.03)
SPECIMEN SOURCE: ABNORMAL
UROBILINOGEN UR STRIP-ACNC: 0.2 EU/DL (ref 0.2–1)
WBC #/AREA URNS AUTO: ABNORMAL /HPF
WET PREP SPEC: ABNORMAL

## 2017-11-01 PROCEDURE — 87210 SMEAR WET MOUNT SALINE/INK: CPT | Performed by: PHYSICIAN ASSISTANT

## 2017-11-01 PROCEDURE — 84703 CHORIONIC GONADOTROPIN ASSAY: CPT | Performed by: PHYSICIAN ASSISTANT

## 2017-11-01 PROCEDURE — 87591 N.GONORRHOEAE DNA AMP PROB: CPT | Performed by: PHYSICIAN ASSISTANT

## 2017-11-01 PROCEDURE — 87491 CHLMYD TRACH DNA AMP PROBE: CPT | Performed by: PHYSICIAN ASSISTANT

## 2017-11-01 PROCEDURE — 99214 OFFICE O/P EST MOD 30 MIN: CPT | Performed by: PHYSICIAN ASSISTANT

## 2017-11-01 PROCEDURE — 81001 URINALYSIS AUTO W/SCOPE: CPT | Performed by: PHYSICIAN ASSISTANT

## 2017-11-01 PROCEDURE — 87086 URINE CULTURE/COLONY COUNT: CPT | Performed by: PHYSICIAN ASSISTANT

## 2017-11-01 RX ORDER — METRONIDAZOLE 500 MG/1
500 TABLET ORAL 2 TIMES DAILY
Qty: 14 TABLET | Refills: 0 | Status: SHIPPED | OUTPATIENT
Start: 2017-11-01 | End: 2017-11-22

## 2017-11-01 NOTE — NURSING NOTE
"Chief Complaint   Patient presents with     IUD     Pt recently had a baby on 09/30/2017. SHe had IUD in on 09/26/17- She check 2 weeks after 10/10/17 and it is still there. Period started 10/13/17 and it was light. 5 days after that is when ot started having heavy perio unitl now. Pt states that she check it to see if she still have the IUD in and she can not feel like.        Initial /74 (BP Location: Left arm, Patient Position: Chair, Cuff Size: Adult Regular)  Pulse 77  Wt 117 lb (53.1 kg)  LMP 09/30/2017  SpO2 98%  Breastfeeding? No  BMI 21.4 kg/m2 Estimated body mass index is 21.4 kg/(m^2) as calculated from the following:    Height as of 5/30/17: 5' 2\" (1.575 m).    Weight as of this encounter: 117 lb (53.1 kg).  Medication Reconciliation: complete   Adam Hernandez MA        "

## 2017-11-01 NOTE — MR AVS SNAPSHOT
"              After Visit Summary   11/1/2017    Liam Ellis    MRN: [id number]           Patient Information     Date Of Birth          1980        Visit Information        Provider Department      11/1/2017 3:20 PM Alistair Vega PA [facility]        Today's Diagnoses     Urinary frequency    -  1    Pelvic pain in female        Bacterial vaginal infection        Abnormal urinalysis          Care Instructions    .cj  Bacterial Vaginosis    You have a bacterial infection of the vagina called bacterial vaginosis (BV). It may also be called gardnerella or non-specific vaginitis. BV occurs when the \"bad\" bacteria outnumber the \"good\" bacteria that are normally present in the vagina. Symptoms include foul-smelling vaginal discharge (most noticeable after vaginal intercourse). There may also be burning with urination. The burning is caused as the urine passes over the inflamed outer vaginal area.  The cause of bacterial vaginosis is not certain. However, your risk is higher if you recently began a new sexual relationship, or have had many sex partners in the past. Your risk is also higher if you douche often.  While bacterial vaginosis most often occurs only in sexually active women, this is not a true sexually transmitted disease. You did not get this from your partner. You cannot give it to your partner. The infection may be related to temporary changes in the pH of vaginal fluids after being exposed to semen.  Home Care:    Keep the genital area clean and free of discharge. Do this by wearing an absorbent sanitary pad and changing it often. Shower daily. When you shower, clean the outer vaginal area with plain soap and water.    Do not douche during treatment unless advised to do so by your doctor. Routine douching after treatment is no longer recommended to clean the vagina. It raises your risk of vaginal infection and pelvic inflammatory disease.    Avoid having sex until " you have finished all antibiotic medicine and all symptoms have gone away.    Wear cotton underwear or cotton-lined panty hose. Don t wear pants that are too tight.    Limiting the number of sex partners you have lowers your risk of this and other vaginal infections, STDs, and HIV.    Take all medicine as directed until it is gone, even if you are feeling better. If you don t do this, symptoms might return.  Follow Up  with your doctor if symptoms don t go away after the medicine is finished.  Get Prompt Medical Attention  if any of the following occur:    Fever of 100.4 F (38 C) or higher, or as directed by your healthcare provider    Lower abdominal pain    Rash or joint pain    Painful sores around the outer vaginal area or on your partner s penis    0448-5450 [facility], [address] [place] [address], [place] [address]. All rights reserved. This information is not intended as a substitute for professional medical care. Always follow your healthcare professional's instructions.                  Follow-ups after your visit        Follow-up notes from your care team     Return if symptoms worsen or fail to improve, for  , Physical Exam w/GYN prn.      Who to contact     If you have questions or need follow up information about today's clinic visit or your schedule please contact [facility] directly at [phone].  Normal or non-critical lab and imaging results will be communicated to you by MyChart, letter or phone within 4 business days after the clinic has received the results. If you do not hear from us within 7 days, please contact the clinic through MyChart or phone. If you have a critical or abnormal lab result, we will notify you by phone as soon as possible.  Submit refill requests through [employer] or call your pharmacy and they will forward the refill request to us. Please allow 3 business days for your refill to be completed.          Additional Information About Your Visit         [employer] Information     [employer] gives you secure access to your electronic health record. If you see a primary care provider, you can also send messages to your care team and make appointments. If you have questions, please call your primary care clinic.  If you do not have a primary care provider, please call [phone] and they will assist you.        Care EveryWhere ID     This is your Care EveryWhere ID. This could be used by other organizations to access your [place] medical records  [id number]        Your Vitals Were     Pulse Last Period Pulse Oximetry Breastfeeding? BMI (Body Mass Index)       77 09/30/2017 98% No 21.4 kg/m2        Blood Pressure from Last 3 Encounters:   11/01/17 108/74   10/10/17 96/65   07/21/17 (!) 88/63    Weight from Last 3 Encounters:   11/01/17 117 lb (53.1 kg)   10/10/17 118 lb (53.5 kg)   07/21/17 124 lb (56.2 kg)              We Performed the Following     Beta HCG qual IFA urine     Chlamydia trachomatis PCR     Neisseria gonorrhoeae PCR     UA reflex to Microscopic and Culture     Urine Culture Aerobic Bacterial     Urine Microscopic     Wet prep          Today's Medication Changes          These changes are accurate as of: 11/1/17  4:58 PM.  If you have any questions, ask your nurse or doctor.               Start taking these medicines.        Dose/Directions    metroNIDAZOLE 500 MG tablet   Commonly known as:  FLAGYL   Used for:  Bacterial vaginal infection   Started by:  Alistair Vega PA        Dose:  500 mg   Take 1 tablet (500 mg) by mouth 2 times daily   Quantity:  14 tablet   Refills:  0            Where to get your medicines      These medications were sent to [id number] - [place] - [facility] AT [place] [address] [address], [place] [address]     Phone:  [phone]     metroNIDAZOLE 500 MG tablet                Primary Care Provider Office Phone # Fax #    ELLEN Stanford CNP  [phone]       [facility] [address]  [facility] [address]        Equal Access to Services     MILADYS DAVIES : Kin hedrick Sourvashi, warupalda luqadaha, qaybta kaalmada adexinda, michelle colettein hayaamarah youngbloodsameer holder simi fontanez. So [facility] [phone].    ATENCIÓN: Si habla español, tiene a varela disposición servicios gratuitos de asistencia lingüística. Llame al [phone].    We comply with applicable federal civil rights laws and [place] laws. We do not discriminate on the basis of race, color, national origin, age, disability, sex, sexual orientation, or gender identity.            Thank you!     Thank you for choosing [facility]  for your care. Our goal is always to provide you with excellent care. Hearing back from our patients is one way we can continue to improve our services. Please take a few minutes to complete the written survey that you may receive in the mail after your visit with us. Thank you!             Your Updated Medication List - Protect others around you: Learn how to safely use, store and throw away your medicines at www.disposemymeds.org.          This list is accurate as of: 17  4:58 PM.  Always use your most recent med list.                   Brand Name Dispense Instructions for use Diagnosis    acetaminophen 325 MG tablet    TYLENOL    60 tablet    Take 2 tablets (650 mg) by mouth every 4 hours as needed for other (surgical pain)    S/P  section       ibuprofen 600 MG tablet    ADVIL/MOTRIN    60 tablet    Take 1 tablet (600 mg) by mouth every 6 hours as needed for other (cramping)    S/P  section       metroNIDAZOLE 500 MG tablet    FLAGYL    14 tablet    Take 1 tablet (500 mg) by mouth 2 times daily    Bacterial vaginal infection       oxyCODONE 5 MG IR tablet    ROXICODONE    25 tablet    Take 1 tablet (5 mg) by mouth every 3 hours as needed for moderate to severe pain    S/P  section       prenatal  multivitamin plus iron 27-0.8 MG Tabs per tablet      Take 1 tablet by mouth daily        senna-docusate 8.6-50 MG per tablet    SENOKOT-S;PERICOLACE    100 tablet    Take 1-2 tablets by mouth 2 times daily    S/P  section

## 2017-11-01 NOTE — PATIENT INSTRUCTIONS
".cj  Bacterial Vaginosis    You have a bacterial infection of the vagina called bacterial vaginosis (BV). It may also be called gardnerella or non-specific vaginitis. BV occurs when the \"bad\" bacteria outnumber the \"good\" bacteria that are normally present in the vagina. Symptoms include foul-smelling vaginal discharge (most noticeable after vaginal intercourse). There may also be burning with urination. The burning is caused as the urine passes over the inflamed outer vaginal area.  The cause of bacterial vaginosis is not certain. However, your risk is higher if you recently began a new sexual relationship, or have had many sex partners in the past. Your risk is also higher if you douche often.  While bacterial vaginosis most often occurs only in sexually active women, this is not a true sexually transmitted disease. You did not get this from your partner. You cannot give it to your partner. The infection may be related to temporary changes in the pH of vaginal fluids after being exposed to semen.  Home Care:    Keep the genital area clean and free of discharge. Do this by wearing an absorbent sanitary pad and changing it often. Shower daily. When you shower, clean the outer vaginal area with plain soap and water.    Do not douche during treatment unless advised to do so by your doctor. Routine douching after treatment is no longer recommended to clean the vagina. It raises your risk of vaginal infection and pelvic inflammatory disease.    Avoid having sex until you have finished all antibiotic medicine and all symptoms have gone away.    Wear cotton underwear or cotton-lined panty hose. Don t wear pants that are too tight.    Limiting the number of sex partners you have lowers your risk of this and other vaginal infections, STDs, and HIV.    Take all medicine as directed until it is gone, even if you are feeling better. If you don t do this, symptoms might return.  Follow Up  with your doctor if symptoms don t go " away after the medicine is finished.  Get Prompt Medical Attention  if any of the following occur:    Fever of 100.4 F (38 C) or higher, or as directed by your healthcare provider    Lower abdominal pain    Rash or joint pain    Painful sores around the outer vaginal area or on your partner s penis    [id number] Ale Schmidt, [address] [place] [address], [place] [address]. All rights reserved. This information is not intended as a substitute for professional medical care. Always follow your healthcare professional's instructions.

## 2017-11-01 NOTE — TELEPHONE ENCOUNTER
Reason for Disposition    Caller has NON-URGENT question and triager unable to answer question    Additional Information    Negative: Shock suspected (e.g., cold/pale/clammy skin, too weak to stand, low BP, rapid pulse)    Negative: Sounds like a life-threatening emergency to the triager    Negative: [1] Abdominal pain AND [2] pregnant < 20 weeks    Negative: [1] Vaginal bleeding AND [2] pregnant < 20 weeks    Negative: Vaginal discharge is main symptom    Negative: [1] SEVERE abdominal pain (e.g., excruciating) AND [2] present > 1 hour    Negative: SEVERE vaginal bleeding (i.e., soaking 2 pads or tampons per hour and present 2 or more hours)    Negative: Patient sounds very sick or weak to the triager    Negative: MODERATE vaginal bleeding (i.e., soaking 1 pad or tampon per hour and present > 6 hours)    Negative: [1] Constant abdominal pain AND [2] present > 2 hours    Negative: Caller has URGENT question and triager unable to answer question    Negative: [1] MILD abdominal pain (e.g., does not interfere with normal activities) AND [2] pain comes and goes (cramps) AND [3] present > 48 hours    Protocols used: CONTRACEPTION - IUD SYMPTOMS AND QUESTIONS-ADULT-VEL Wheeler feels that her IUD may be out.  Liam denies abdominal pain and severe vaginal bleeding.  Liam is requesting to be seen in clinic.

## 2017-11-01 NOTE — PROGRESS NOTES
SUBJECTIVE:   Liam Ellis is a 37 year old female who presents to clinic today for the following health issues:      IUD concern    Patient had an IUD placed 10/10/17.  She came in today because she is concerned about bleeding after insertion of the device and because she is unable to locate the IUD strings. She had 5 days of light bleeding, followed by 5 heavier days, only very faint bleeding lately.    Over the past two days she has experienced mild R lower quadrant and suprapubic pain and increased urinary frequency.  She denies fever or lethargy.      No Known Allergies    Past Medical History:   Diagnosis Date     Diabetes (H)      TMJ (temporomandibular joint syndrome) 12/10/2013         Current Outpatient Prescriptions on File Prior to Visit:  oxyCODONE (ROXICODONE) 5 MG IR tablet Take 1 tablet (5 mg) by mouth every 3 hours as needed for moderate to severe pain (Patient not taking: Reported on 6/8/2017)   acetaminophen (TYLENOL) 325 MG tablet Take 2 tablets (650 mg) by mouth every 4 hours as needed for other (surgical pain) (Patient not taking: Reported on 10/10/2017)   ibuprofen (ADVIL/MOTRIN) 600 MG tablet Take 1 tablet (600 mg) by mouth every 6 hours as needed for other (cramping) (Patient not taking: Reported on 10/10/2017)   senna-docusate (SENOKOT-S;PERICOLACE) 8.6-50 MG per tablet Take 1-2 tablets by mouth 2 times daily (Patient not taking: Reported on 6/8/2017)   Prenatal Vit-Fe Fumarate-FA (PRENATAL MULTIVITAMIN  PLUS IRON) 27-0.8 MG TABS Take 1 tablet by mouth daily     No current facility-administered medications on file prior to visit.     Social History   Substance Use Topics     Smoking status: Never Smoker     Smokeless tobacco: Never Used     Alcohol use No       ROS:  General: negative for fever  ABD: as above, normal BMs  : as above  GYN- as above, recent c/s    OBJECTIVE:  /74 (BP Location: Left arm, Patient Position: Chair, Cuff Size: Adult Regular)  Pulse 77  Wt 117 lb  (53.1 kg)  LMP 09/30/2017  SpO2 98%  Breastfeeding? No  BMI 21.4 kg/m2   General:   awake, alert, and cooperative.  NAD.   Head: Normocephalic, atraumatic.  Eyes: Conjunctiva clear, non icteric.   RESP; normal breathing rate  ABD: soft, mild tenderness to palpation of suprapubic and RLL areas , no rigidity, guarding or rebound . No CVAT  Neuro: Alert and oriented - normal speech.     Upreg negative  UA blood and leuks, micro blood, wbcs and epithelial  WET PREP + clue cells      ASSESSMENT:  Well appearing,. Benign exam.   . Did offer pelvic exam , but as I can't remove the IUD or properly place the string,  No concern for acute intrabodominal perf, will need to see GYN anyway  To ensure proper IUD/string placement. Patient amenable to deferring pelvic exam today.      ICD-10-CM    1. Urinary frequency R35.0 UA reflex to Microscopic and Culture     Beta HCG qual IFA urine     Wet prep     Chlamydia trachomatis PCR     Neisseria gonorrhoeae PCR     Urine Microscopic   2. Pelvic pain in female R10.2    3. Bacterial vaginal infection N76.0 metroNIDAZOLE (FLAGYL) 500 MG tablet    B96.89    4. Abnormal urinalysis R82.90 Urine Culture Aerobic Bacterial           PLAN:   As per ordered above.  Drink plenty of fluids.    Follow up with primary care physician if not improving.  Advised about symptoms which might herald more serious problems.

## 2017-11-01 NOTE — LETTER
November 3, 2017      Liam Ellis  [address] [facility]  [place] [address]        Dear ,    We are writing to inform you of your test results.    All of your cultures were normal.     Please contact the clinic if you have additional questions or if symptoms persist.  Thank you.     Resulted Orders   UA reflex to Microscopic and Culture   Result Value Ref Range    Color Urine Yellow     Appearance Urine Slightly Cloudy     Glucose Urine Negative NEG^Negative mg/dL    Bilirubin Urine Negative NEG^Negative    Ketones Urine Negative NEG^Negative mg/dL    Specific Gravity Urine 1.015 1.003 - 1.035    Blood Urine Moderate (A) NEG^Negative    pH Urine 7.0 5.0 - 7.0 pH    Protein Albumin Urine Negative NEG^Negative mg/dL    Urobilinogen Urine 0.2 0.2 - 1.0 EU/dL    Nitrite Urine Negative NEG^Negative    Leukocyte Esterase Urine Moderate (A) NEG^Negative    Source Midstream Urine    Beta HCG qual IFA urine   Result Value Ref Range    Beta HCG Qual IFA Urine Negative NEG^Negative      Wet prep   Result Value Ref Range    Specimen Description Vagina     Wet Prep No Trichomonas seen     Wet Prep Clue cells seen (A)     Wet Prep No yeast seen    Chlamydia trachomatis PCR   Result Value Ref Range    Specimen Description Vagina     Chlamydia Trachomatis PCR Negative NEG^Negative      Comment:      Negative for C. trachomatis rRNA by transcription mediated amplification.  A negative result by transcription mediated amplification does not preclude   the presence of C. trachomatis infection because results are dependent on   proper and adequate collection, absence of inhibitors, and sufficient rRNA to   be detected.     Neisseria gonorrhoeae PCR   Result Value Ref Range    Specimen Descrip Vagina     N Gonorrhea PCR Negative NEG^Negative      Comment:      Negative for N. gonorrhoeae rRNA by transcription mediated amplification.  A negative result by transcription mediated amplification does not preclude   the  presence of N. gonorrhoeae infection because results are dependent on   proper and adequate collection, absence of inhibitors, and sufficient rRNA to   be detected.     Urine Microscopic   Result Value Ref Range    WBC Urine 10-25 (A) OTO2^O - 2 /HPF    RBC Urine 5-10 (A) OTO2^O - 2 /HPF    Squamous Epithelial /LPF Urine Many (A) FEW^Few /LPF    Bacteria Urine Moderate (A) NEG^Negative /HPF   Urine Culture Aerobic Bacterial   Result Value Ref Range    Specimen Description Midstream Urine     Culture Micro No growth        If you have any questions or concerns, please call the clinic at the number listed above.       Sincerely,        JENNY Samano

## 2017-11-02 LAB
BACTERIA SPEC CULT: NO GROWTH
C TRACH DNA SPEC QL NAA+PROBE: NEGATIVE
N GONORRHOEA DNA SPEC QL NAA+PROBE: NEGATIVE
SPECIMEN SOURCE: NORMAL

## 2017-11-03 NOTE — PROGRESS NOTES
Ms. Ellis,    All of your cultures were normal.    Please contact the clinic if you have additional questions or if symptoms persist.  Thank you.    Sincerely,    Alistair Vega

## 2017-11-22 ENCOUNTER — OFFICE VISIT (OUTPATIENT)
Dept: OBGYN | Facility: CLINIC | Age: 37
End: 2017-11-22
Payer: COMMERCIAL

## 2017-11-22 VITALS
HEART RATE: 78 BPM | BODY MASS INDEX: 22.18 KG/M2 | TEMPERATURE: 97.1 F | WEIGHT: 120.5 LBS | HEIGHT: 62 IN | SYSTOLIC BLOOD PRESSURE: 92 MMHG | DIASTOLIC BLOOD PRESSURE: 65 MMHG

## 2017-11-22 DIAGNOSIS — Z30.431 IUD CHECK UP: ICD-10-CM

## 2017-11-22 DIAGNOSIS — Z97.5 IUD (INTRAUTERINE DEVICE) IN PLACE: Primary | ICD-10-CM

## 2017-11-22 PROCEDURE — 99213 OFFICE O/P EST LOW 20 MIN: CPT | Performed by: OBSTETRICS & GYNECOLOGY

## 2017-11-22 NOTE — MR AVS SNAPSHOT
"              After Visit Summary   11/22/2017    Liam Ellis    MRN: [id number]           Patient Information     Date Of Birth          1980        Visit Information        Provider Department      11/22/2017 9:30 AM Shahida Raphael MD [facility]        Today's Diagnoses     IUD (intrauterine device) in place    -  1    IUD check up           Follow-ups after your visit        Who to contact     If you have questions or need follow up information about today's clinic visit or your schedule please contact [facility] directly at [phone].  Normal or non-critical lab and imaging results will be communicated to you by [employer], letter or phone within 4 business days after the clinic has received the results. If you do not hear from us within 7 days, please contact the clinic through [employer] or phone. If you have a critical or abnormal lab result, we will notify you by phone as soon as possible.  Submit refill requests through [employer] or call your pharmacy and they will forward the refill request to us. Please allow 3 business days for your refill to be completed.          Additional Information About Your Visit        MyChart Information     [employer] gives you secure access to your electronic health record. If you see a primary care provider, you can also send messages to your care team and make appointments. If you have questions, please call your primary care clinic.  If you do not have a primary care provider, please call [phone] and they will assist you.        Care EveryWhere ID     This is your Care EveryWhere ID. This could be used by other organizations to access your [place] medical records  [id number]        Your Vitals Were     Pulse Temperature Height Last Period Breastfeeding? BMI (Body Mass Index)    78 97.1  F (36.2  C) (Oral) 5' 2\" (1.575 m) 11/11/2017 (Approximate) No 22.04 kg/m2       Blood Pressure from Last 3 Encounters:   11/22/17 92/65 "   11/01/17 108/74   10/10/17 96/65    Weight from Last 3 Encounters:   11/22/17 120 lb 8 oz (54.7 kg)   11/01/17 117 lb (53.1 kg)   10/10/17 118 lb (53.5 kg)              Today, you had the following     No orders found for display       Primary Care Provider Office Phone # Fax #    Ana Rosa Harvey, ELLEN [phone]       [facility] [address]  [facility] [address]        Equal Access to Services     [facility]: Hadii aad ku hadasho Soomaali, waaxda luqadaha, qaybta kaalmada adeegyada, waxay tashi hayjuan carlos belcher . So [facility] [phone].    ATENCIÓN: Si habla español, tiene a varela disposición servicios gratuitos de asistencia lingüística. Alexandra al [phone].    We comply with applicable federal civil rights laws and [place] laws. We do not discriminate on the basis of race, color, national origin, age, disability, sex, sexual orientation, or gender identity.            Thank you!     Thank you for choosing [facility]  for your care. Our goal is always to provide you with excellent care. Hearing back from our patients is one way we can continue to improve our services. Please take a few minutes to complete the written survey that you may receive in the mail after your visit with us. Thank you!             Your Updated Medication List - Protect others around you: Learn how to safely use, store and throw away your medicines at www.disposemymeds.org.      Notice  As of 11/22/2017  9:54 AM    You have not been prescribed any medications.

## 2017-11-22 NOTE — PROGRESS NOTES
"S; Liam Ellis is a 37 year old  who had a mirena IUD placed about 6 weeks ago.  She initially was able to feel the strings (about 2 weeks after placement), then had a period that lasted about 7 days and seemed heavy, so she checked the strings after that and couldn't feel them anymore.  She started bleeding again a few days after that period stopped so she is concerned her IUD has fallen out.  She never saw the IUD, just thought she wasn't supposed to have bleeding and wondering why it has continued. She denies significant pain or cramping.    O: BP 92/65  Pulse 78  Temp 97.1  F (36.2  C) (Oral)  Ht 5' 2\" (1.575 m)  Wt 120 lb 8 oz (54.7 kg)  LMP 2017 (Approximate)  Breastfeeding? No  BMI 22.04 kg/m2    Gen: NAD  Abd; SNT  Pelvic: normal vulva and vagina.  Scant dark/brown blood in vagina.  Cervix with lesions or CMT.  IUD strings visible.  Ex; no c/c/e    A/P; IUD in place   I explained that irregular bleeding is common and expected with a new Mirena IUD.  I explained it can be normal up to 6 months after placement, typically improves within the first few months.  Monitor for now, but if heavy or persistent, she can let me know and we can to us to check IUD position.    MELIDA ANDINO MD    "

## 2017-11-22 NOTE — NURSING NOTE
"Chief Complaint   Patient presents with     IUD     iud check , pt. thinks\" it may have fallen out, couldnt find it\"       Initial BP 92/65  Pulse 78  Temp 97.1  F (36.2  C) (Oral)  Ht 5' 2\" (1.575 m)  Wt 120 lb 8 oz (54.7 kg)  LMP 2017 (Approximate)  Breastfeeding? No  BMI 22.04 kg/m2 Estimated body mass index is 22.04 kg/(m^2) as calculated from the following:    Height as of this encounter: 5' 2\" (1.575 m).    Weight as of this encounter: 120 lb 8 oz (54.7 kg).  BP completed using cuff size: regular        The following HM Due: NONE      The following patient reported/Care Every where data was sent to:  P ABSTRACT QUALITY INITIATIVES [[id number]]  Rose Manuel        patient has appointment for today  Rose Manuel                "

## 2018-02-15 ENCOUNTER — OFFICE VISIT (OUTPATIENT)
Dept: FAMILY MEDICINE | Facility: CLINIC | Age: 38
End: 2018-02-15
Payer: COMMERCIAL

## 2018-02-15 VITALS
TEMPERATURE: 98.5 F | WEIGHT: 122.4 LBS | OXYGEN SATURATION: 98 % | DIASTOLIC BLOOD PRESSURE: 67 MMHG | HEART RATE: 91 BPM | SYSTOLIC BLOOD PRESSURE: 96 MMHG | BODY MASS INDEX: 22.39 KG/M2

## 2018-02-15 DIAGNOSIS — J06.9 UPPER RESPIRATORY TRACT INFECTION, UNSPECIFIED TYPE: Primary | ICD-10-CM

## 2018-02-15 DIAGNOSIS — R07.0 THROAT PAIN: ICD-10-CM

## 2018-02-15 LAB
DEPRECATED S PYO AG THROAT QL EIA: NORMAL
SPECIMEN SOURCE: NORMAL

## 2018-02-15 PROCEDURE — 87081 CULTURE SCREEN ONLY: CPT | Performed by: PHYSICIAN ASSISTANT

## 2018-02-15 PROCEDURE — 87880 STREP A ASSAY W/OPTIC: CPT | Performed by: PHYSICIAN ASSISTANT

## 2018-02-15 PROCEDURE — 99213 OFFICE O/P EST LOW 20 MIN: CPT | Performed by: PHYSICIAN ASSISTANT

## 2018-02-15 RX ORDER — BENZONATATE 200 MG/1
200 CAPSULE ORAL 3 TIMES DAILY PRN
Qty: 20 CAPSULE | Refills: 1 | Status: SHIPPED | OUTPATIENT
Start: 2018-02-15 | End: 2018-12-14

## 2018-02-15 RX ORDER — DIPHENHYDRAMINE HYDROCHLORIDE AND LIDOCAINE HYDROCHLORIDE AND ALUMINUM HYDROXIDE AND MAGNESIUM HYDRO
5-10 KIT EVERY 6 HOURS PRN
Qty: 120 ML | Refills: 1 | Status: SHIPPED | OUTPATIENT
Start: 2018-02-15 | End: 2018-12-14

## 2018-02-15 NOTE — LETTER
February 16, 2018      Liam Ellis  [address] [facility]  [place] [address]        Ms. Ellis,    Your strep culture was negative    Please contact the clinic if you have additional questions or if symptoms persist.  Thank you.      Resulted Orders   Rapid strep screen   Result Value Ref Range    Specimen Description Throat     Rapid Strep A Screen       NEGATIVE: No Group A streptococcal antigen detected by immunoassay, await culture report.   Beta strep group A culture   Result Value Ref Range    Specimen Description Throat     Culture Micro No beta hemolytic Streptococcus Group A isolated        If you have any questions or concerns, please call the clinic at the number listed above.       Sincerely,        JENNY Samano/YESI

## 2018-02-15 NOTE — MR AVS SNAPSHOT
After Visit Summary   2/15/2018    Liam Ellis    MRN: [id number]           Patient Information     Date Of Birth          1980        Visit Information        Provider Department      2/15/2018 10:20 AM Alistair Vega PA [facility]        Today's Diagnoses     Upper respiratory tract infection, unspecified type    -  1    Throat pain          Care Instructions    Trial over the counter symptomatic relief, rest, and drink plenty of fluids. Salt water gargles and nasal lavage may also be helpful.  Return to clinic or Emergency Department for breathing/swallowing problems, fever >105, altered mental status, or worsening general condition.      Viral Respiratory Illness:  You have an Upper Respiratory Illness (URI) caused by a virus. This illness is contagious during the first few days. It is spread through the air by coughing and sneezing or by direct contact (touching the sick person and then touching your own eyes, nose or mouth). Most viral illnesses go away within 7-10 days with rest and simple home remedies. Sometimes, the illness may last for several weeks. Antibiotics will not kill a virus and are generally not prescribed for this condition.  Home Care:  1) If symptoms are severe, rest at home for the first 2-3 days. When you resume activity, don't let yourself get too tired.  2) Avoid being exposed to cigarette smoke (yours or others ).  3) Tylenol (acetaminophen) or ibuprofen (Advil, Motrin) will help fever, muscle aching and headache. (Persons under 18 with fever should not take aspirin since this may cause liver damage.)  4) Your appetite may be poor, so a light diet is fine. Avoid dehydration by drinking 6-8 glasses of fluids per day (water, soft, drinks, juices, tea, soup, etc.). Extra fluids will help loosen secretions in the nose and lungs.  5) Over-the-counter cold medicines will not shorten the length of time you re sick, but they may be helpful  for the following symptoms: cough (Robitussin DM); sore throat (Chloraseptic lozenges or spray); nasal and sinus congestion (Actifed, Sudafed, Chlortrimeton).  Follow Up  with your doctor or as advised if you don t improve over the next week.  Get Prompt Medical Attention  if any of the following occur:  -- Cough with lots of colored sputum (mucus) or blood in your sputum  -- Chest pain, shortness of breath, wheezing or have trouble breathing  -- Severe headache; face, neck or ear pain  -- Fever over 100.4  F (38.0  C) for more than three days  -- You can t swallow due to throat pain    3811-5390 [facility], [address] [place] [address], [place] [address]. All rights reserved. This information is not intended as a substitute for professional medical care. Always follow your healthcare professional's instructions.                Follow-ups after your visit        Follow-up notes from your care team     Return if symptoms worsen or fail to improve.      Who to contact     If you have questions or need follow up information about today's clinic visit or your schedule please contact [facility] directly at [phone].  Normal or non-critical lab and imaging results will be communicated to you by CareXtendhart, letter or phone within 4 business days after the clinic has received the results. If you do not hear from us within 7 days, please contact the clinic through CareXtendhart or phone. If you have a critical or abnormal lab result, we will notify you by phone as soon as possible.  Submit refill requests through [employer] or call your pharmacy and they will forward the refill request to us. Please allow 3 business days for your refill to be completed.          Additional Information About Your Visit        CareXtendhart Information     [employer] gives you secure access to your electronic health record. If you see a primary care provider, you can also send messages to your care team and make appointments. If you have  questions, please call your primary care clinic.  If you do not have a primary care provider, please call [phone] and they will assist you.        Care EveryWhere ID     This is your Care EveryWhere ID. This could be used by other organizations to access your [place] medical records  [id number]        Your Vitals Were     Pulse Temperature Pulse Oximetry BMI (Body Mass Index)          91 98.5  F (36.9  C) (Tympanic) 98% 22.39 kg/m2         Blood Pressure from Last 3 Encounters:   02/15/18 96/67   11/22/17 92/65   11/01/17 108/74    Weight from Last 3 Encounters:   02/15/18 122 lb 6.4 oz (55.5 kg)   11/22/17 120 lb 8 oz (54.7 kg)   11/01/17 117 lb (53.1 kg)              We Performed the Following     Beta strep group A culture     Rapid strep screen          Today's Medication Changes          These changes are accurate as of 2/15/18 11:45 AM.  If you have any questions, ask your nurse or doctor.               Start taking these medicines.        Dose/Directions    benzonatate 200 MG capsule   Commonly known as:  TESSALON   Used for:  Upper respiratory tract infection, unspecified type   Started by:  Alistair Vega PA        Dose:  200 mg   Take 1 capsule (200 mg) by mouth 3 times daily as needed for cough   Quantity:  20 capsule   Refills:  1       magic mouthwash suspension (diphenhydrAMINE, lidocaine, aluminum-magnesium & simethicone) Susp compounding kit   Used for:  Upper respiratory tract infection, unspecified type   Started by:  Alistair Vega PA        Dose:  5-10 mL   Swish and spit 5-10 mLs in mouth every 6 hours as needed for mild pain 1:1:1 diphen 12/5/5ml:lidocaine visc 2%:maalox   Quantity:  120 mL   Refills:  1            Where to get your medicines      These medications were sent to [facility] - [place] - [facility] [address] AT [address] [place], [place] [address]     Phone:  [phone]      benzonatate 200 MG capsule         Some of these will need a paper prescription and others can be bought over the counter.  Ask your nurse if you have questions.     Bring a paper prescription for each of these medications     magic mouthwash suspension (diphenhydrAMINE, lidocaine, aluminum-magnesium & simethicone) Susp compounding kit                Primary Care Provider Office Phone # Fax #    ELLEN Stanford [phone]       [facility] [address]  [facility] [address]        Equal Access to Services     MILADYS DAVIES : Hadii aad ku hadasho Soomaali, waaxda luqadaha, qaybta kaalmada adeegyada, waxay idiin hayaan adeeg kharash simi . So [facility] [phone].    ATENCIÓN: Si habla español, tiene a varela disposición servicios gratuitos de asistencia lingüística. Alexandra al [phone].    We comply with applicable federal civil rights laws and [place] laws. We do not discriminate on the basis of race, color, national origin, age, disability, sex, sexual orientation, or gender identity.            Thank you!     Thank you for choosing [facility]  for your care. Our goal is always to provide you with excellent care. Hearing back from our patients is one way we can continue to improve our services. Please take a few minutes to complete the written survey that you may receive in the mail after your visit with us. Thank you!             Your Updated Medication List - Protect others around you: Learn how to safely use, store and throw away your medicines at www.disposemymeds.org.          This list is accurate as of 2/15/18 11:45 AM.  Always use your most recent med list.                   Brand Name Dispense Instructions for use Diagnosis    benzonatate 200 MG capsule    TESSALON    20 capsule    Take 1 capsule (200 mg) by mouth 3 times daily as needed for cough    Upper respiratory tract infection, unspecified type       magic mouthwash suspension (diphenhydrAMINE,  lidocaine, aluminum-magnesium & simethicone) Susp compounding kit     120 mL    Swish and spit 5-10 mLs in mouth every 6 hours as needed for mild pain 1:1:1 diphen 12/5/5ml:lidocaine visc 2%:maalox    Upper respiratory tract infection, unspecified type

## 2018-02-15 NOTE — PATIENT INSTRUCTIONS
Trial over the counter symptomatic relief, rest, and drink plenty of fluids. Salt water gargles and nasal lavage may also be helpful.  Return to clinic or Emergency Department for breathing/swallowing problems, fever >105, altered mental status, or worsening general condition.      Viral Respiratory Illness:  You have an Upper Respiratory Illness (URI) caused by a virus. This illness is contagious during the first few days. It is spread through the air by coughing and sneezing or by direct contact (touching the sick person and then touching your own eyes, nose or mouth). Most viral illnesses go away within 7-10 days with rest and simple home remedies. Sometimes, the illness may last for several weeks. Antibiotics will not kill a virus and are generally not prescribed for this condition.  Home Care:  1) If symptoms are severe, rest at home for the first 2-3 days. When you resume activity, don't let yourself get too tired.  2) Avoid being exposed to cigarette smoke (yours or others ).  3) Tylenol (acetaminophen) or ibuprofen (Advil, Motrin) will help fever, muscle aching and headache. (Persons under 18 with fever should not take aspirin since this may cause liver damage.)  4) Your appetite may be poor, so a light diet is fine. Avoid dehydration by drinking 6-8 glasses of fluids per day (water, soft, drinks, juices, tea, soup, etc.). Extra fluids will help loosen secretions in the nose and lungs.  5) Over-the-counter cold medicines will not shorten the length of time you re sick, but they may be helpful for the following symptoms: cough (Robitussin DM); sore throat (Chloraseptic lozenges or spray); nasal and sinus congestion (Actifed, Sudafed, Chlortrimeton).  Follow Up  with your doctor or as advised if you don t improve over the next week.  Get Prompt Medical Attention  if any of the following occur:  -- Cough with lots of colored sputum (mucus) or blood in your sputum  -- Chest pain, shortness of breath, wheezing or  have trouble breathing  -- Severe headache; face, neck or ear pain  -- Fever over 100.4  F (38.0  C) for more than three days  -- You can t swallow due to throat pain    6997-0109 [employer] [facility], [address] [place] [address], [place] [address]. All rights reserved. This information is not intended as a substitute for professional medical care. Always follow your healthcare professional's instructions.

## 2018-02-15 NOTE — PROGRESS NOTES
SUBJECTIVE:   Liam Ellis is a 38 year old female who presents to clinic today for the following health issues:  ENT Symptoms             Symptoms: cc Present Absent Comment   Fever/Chills   X    Fatigue  X     Muscle Aches  X     Eye Irritation   X    Sneezing  X     Nasal Neri/Drg  X  Nasal congestion   Sinus Pressure/Pain       Loss of smell   X    Dental pain   X    Sore Throat  X     Swollen Glands  X     Ear Pain/Fullness  X  Left ear itchy   Cough  X  A little, sometimes uncontrollable cough   Wheeze   X    Chest Pain   X    Shortness of breath   X    Rash   X    Other   X      Symptom duration:  2/11/18   Symptom severity:  severe   Treatments tried:  Ibuprofen- helps a little- 2 am last dose   Contacts:  baby was coughing before                No Known Allergies    Patient Active Problem List   Diagnosis     CARDIOVASCULAR SCREENING; LDL GOAL LESS THAN 130     TMJ (temporomandibular joint syndrome)     Appendicitis     Closed displaced fracture of base of metacarpal bone     Need for Tdap vaccination     Encounter for insertion of intrauterine contraceptive device (IUD)       Past Medical History:   Diagnosis Date     Diabetes (H)      TMJ (temporomandibular joint syndrome) 12/10/2013         No current outpatient prescriptions on file prior to visit.  No current facility-administered medications on file prior to visit.     Social History   Substance Use Topics     Smoking status: Never Smoker     Smokeless tobacco: Never Used     Alcohol use No       Family History   Problem Relation Age of Onset     Hypertension Mother      DIABETES Mother        ROS:  Consitutional: As above  ENT: As above  Respiratory: As above    OBJECTIVE:  BP 96/67 (BP Location: Left arm, Patient Position: Chair, Cuff Size: Adult Regular)  Pulse 91  Temp 98.5  F (36.9  C) (Tympanic)  Wt 122 lb 6.4 oz (55.5 kg)  SpO2 98%  BMI 22.39 kg/m2  GENERAL APPEARANCE: healthy, alert and no distress  EYES: conjunctiva clear  HENT:     TMs w/o erythema, effusion or bulging.  Nose and mouth without ulcers, erythema or lesions.  NO tonsillar enlargement erythema or exudates.   NECK: supple, nontender, no lymphadenopathy  RESP: lungs clear to auscultation - no rales, rhonchi or wheezes  CV: regular rates and rhythm, normal S1 S2, no murmur noted  NEURO: awake, alert      RST NEG    ASSESSMENT: Well appearing.    ICD-10-CM    1. Upper respiratory tract infection, unspecified type J06.9 benzonatate (TESSALON) 200 MG capsule     magic mouthwash (FIRST-MOUTHWASH BLM) suspension   2. Throat pain R07.0 Rapid strep screen     Beta strep group A culture         PLAN:  Lots of rest and fluids.  RTC if any worsening symptoms or if not improving.    Joce Vega PA-C

## 2018-02-16 LAB
BACTERIA SPEC CULT: NORMAL
SPECIMEN SOURCE: NORMAL

## 2018-02-16 NOTE — PROGRESS NOTES
Ms. Ellis,    Your strep culture was negative    Please contact the clinic if you have additional questions or if symptoms persist.  Thank you.    Sincerely,    Alistair Vega

## 2018-05-30 ENCOUNTER — OFFICE VISIT (OUTPATIENT)
Dept: OPTOMETRY | Facility: CLINIC | Age: 38
End: 2018-05-30
Payer: COMMERCIAL

## 2018-05-30 DIAGNOSIS — G44.219 EPISODIC TENSION-TYPE HEADACHE, NOT INTRACTABLE: ICD-10-CM

## 2018-05-30 DIAGNOSIS — H54.7 PROBLEM FOCUSING EYES: Primary | ICD-10-CM

## 2018-05-30 PROCEDURE — 92015 DETERMINE REFRACTIVE STATE: CPT | Performed by: OPTOMETRIST

## 2018-05-30 PROCEDURE — 92014 COMPRE OPH EXAM EST PT 1/>: CPT | Performed by: OPTOMETRIST

## 2018-05-30 ASSESSMENT — VISUAL ACUITY
OD_SC: 20/20
OS_SC: 20/20
OD_SC: 20/20-1
OS_SC: 20/20-1
METHOD: SNELLEN - LINEAR
OD_SC+: -1

## 2018-05-30 ASSESSMENT — CONF VISUAL FIELD
OD_NORMAL: 1
OS_NORMAL: 1
METHOD: COUNTING FINGERS

## 2018-05-30 ASSESSMENT — CUP TO DISC RATIO
OD_RATIO: 0.25
OS_RATIO: 0.2

## 2018-05-30 ASSESSMENT — REFRACTION_MANIFEST
OS_SPHERE: -0.25
OD_CYLINDER: +0.50
METHOD_AUTOREFRACTION: 1
OS_SPHERE: PLANO
OD_ADD: +1.00
OD_AXIS: 094
OD_SPHERE: +0.00
OS_CYLINDER: +0.25
OS_AXIS: 115
OS_AXIS: 086
OD_SPHERE: PLANO
OS_ADD: +1.00
OS_CYLINDER: +0.25

## 2018-05-30 ASSESSMENT — TONOMETRY
IOP_METHOD: APPLANATION
OD_IOP_MMHG: 13
OS_IOP_MMHG: 16

## 2018-05-30 ASSESSMENT — SLIT LAMP EXAM - LIDS
COMMENTS: NORMAL
COMMENTS: NORMAL

## 2018-05-30 ASSESSMENT — EXTERNAL EXAM - RIGHT EYE: OD_EXAM: NORMAL

## 2018-05-30 ASSESSMENT — EXTERNAL EXAM - LEFT EYE: OS_EXAM: NORMAL

## 2018-05-30 NOTE — MR AVS SNAPSHOT
After Visit Summary   5/30/2018    Liam Ellis    MRN: [id number]           Patient Information     Date Of Birth          1980        Visit Information        Provider Department      5/30/2018 11:00 AM Corina Oquendo OD [facility]        Today's Diagnoses     Problem focusing eyes    -  1    Episodic tension-type headache, not intractable          Care Instructions    Use +1.00 over the counter readers as needed for near work.    See your primary care provider about your headaches.    Your eyes may be blurry at near and sensitive to light for several hours from the dilating drops.    Yearly eye exams recommended.    Thank you!          Follow-ups after your visit        Who to contact     If you have questions or need follow up information about today's clinic visit or your schedule please contact [facility] directly at [phone].  Normal or non-critical lab and imaging results will be communicated to you by [employer]hart, letter or phone within 4 business days after the clinic has received the results. If you do not hear from us within 7 days, please contact the clinic through [employer] or phone. If you have a critical or abnormal lab result, we will notify you by phone as soon as possible.  Submit refill requests through [email or web address] or call your pharmacy and they will forward the refill request to us. Please allow 3 business days for your refill to be completed.          Additional Information About Your Visit        MyChart Information     [email or web address] gives you secure access to your electronic health record. If you see a primary care provider, you can also send messages to your care team and make appointments. If you have questions, please call your primary care clinic.  If you do not have a primary care provider, please call [phone] and they will assist you.        Care EveryWhere ID     This is your Care EveryWhere ID. This could be used by other  organizations to access your [place] medical records  [id number]         Blood Pressure from Last 3 Encounters:   02/15/18 96/67   11/22/17 92/65   11/01/17 108/74    Weight from Last 3 Encounters:   02/15/18 55.5 kg (122 lb 6.4 oz)   11/22/17 54.7 kg (120 lb 8 oz)   11/01/17 53.1 kg (117 lb)              We Performed the Following     EYE EXAM (SIMPLE-NONBILLABLE)     REFRACTION        Primary Care Provider Office Phone # Fax #    Ana Rosa Harvey, ELLEN [phone]       [address] [place] [address]        Equal Access to Services     MILADYS DAVIES : Hadii deborah rhodes hadasho Soomaali, waaxda luqadaha, qaybta kaalmada adeegyada, michelle belcher . So [facility] [phone].    ATENCIÓN: Si habla español, tiene a varela disposición servicios gratuitos de asistencia lingüística. Llame al [phone].    We comply with applicable federal civil rights laws and [place] laws. We do not discriminate on the basis of race, color, national origin, age, disability, sex, sexual orientation, or gender identity.            Thank you!     Thank you for choosing [facility]  for your care. Our goal is always to provide you with excellent care. Hearing back from our patients is one way we can continue to improve our services. Please take a few minutes to complete the written survey that you may receive in the mail after your visit with us. Thank you!             Your Updated Medication List - Protect others around you: Learn how to safely use, store and throw away your medicines at www.disposemymeds.org.          This list is accurate as of 5/30/18 11:37 AM.  Always use your most recent med list.                   Brand Name Dispense Instructions for use Diagnosis    benzonatate 200 MG capsule    TESSALON    20 capsule    Take 1 capsule (200 mg) by mouth 3 times daily as needed for cough    Upper respiratory tract infection, unspecified type       magic  mouthwash suspension (diphenhydrAMINE, lidocaine, aluminum-magnesium & simethicone) Susp compounding kit     120 mL    Swish and spit 5-10 mLs in mouth every 6 hours as needed for mild pain 1:1:1 diphen 12/5/5ml:lidocaine visc 2%:maalox    Upper respiratory tract infection, unspecified type

## 2018-05-30 NOTE — PATIENT INSTRUCTIONS
Use +1.00 over the counter readers as needed for near work.    See your primary care provider about your headaches.    Your eyes may be blurry at near and sensitive to light for several hours from the dilating drops.    Yearly eye exams recommended.    Thank you!

## 2018-05-30 NOTE — PROGRESS NOTES
Chief Complaint   Patient presents with     COMPREHENSIVE EYE EXAM         Last Eye Exam: 5+ years ago  Dilated Previously: Yes    What are you currently using to see?  does not use glasses or contacts       Distance Vision Acuity: Satisfied with vision    Near Vision Acuity: Not satisfied - hard time focusing - once she focuses still blurry    Eye Comfort: good  Do you use eye drops? : No  Occupation or Hobbies: nursing assistant    Violeta Diaz  [employer] Tech            Medical, surgical and family histories reviewed and updated 5/30/2018.       OBJECTIVE: See Ophthalmology exam    ASSESSMENT:    ICD-10-CM    1. Problem focusing eyes H54.7 EYE EXAM (SIMPLE-NONBILLABLE)     REFRACTION   2. Episodic tension-type headache, not intractable G44.219 EYE EXAM (SIMPLE-NONBILLABLE)      PLAN:     Patient Instructions   Use +1.00 over the counter readers as needed for near work.    See your primary care provider about your headaches.    Your eyes may be blurry at near and sensitive to light for several hours from the dilating drops.    Yearly eye exams recommended.    Thank you!

## 2018-05-30 NOTE — LETTER
5/30/2018         RE: Liam Ellis  [address] [facility] [address]        Dear Colleague,    Thank you for referring your patient, Liam Ellis, to the [facility]. Please see a copy of my visit note below.    Chief Complaint   Patient presents with     COMPREHENSIVE EYE EXAM         Last Eye Exam: 5+ years ago  Dilated Previously: Yes    What are you currently using to see?  does not use glasses or contacts       Distance Vision Acuity: Satisfied with vision    Near Vision Acuity: Not satisfied - hard time focusing - once she focuses still blurry    Eye Comfort: good  Do you use eye drops? : No  Occupation or Hobbies: nursing assistant    Violeta Diaz  [employer] Tech            Medical, surgical and family histories reviewed and updated 5/30/2018.       OBJECTIVE: See Ophthalmology exam    ASSESSMENT:    ICD-10-CM    1. Problem focusing eyes H54.7 EYE EXAM (SIMPLE-NONBILLABLE)     REFRACTION   2. Episodic tension-type headache, not intractable G44.219 EYE EXAM (SIMPLE-NONBILLABLE)      PLAN:     Patient Instructions   Use +1.00 over the counter readers as needed for near work.    See your primary care provider about your headaches.    Your eyes may be blurry at near and sensitive to light for several hours from the dilating drops.    Yearly eye exams recommended.    Thank you!         Again, thank you for allowing me to participate in the care of your patient.        Sincerely,        Corina Oquendo OD

## 2018-06-08 ENCOUNTER — OFFICE VISIT (OUTPATIENT)
Dept: FAMILY MEDICINE | Facility: CLINIC | Age: 38
End: 2018-06-08
Payer: COMMERCIAL

## 2018-06-08 ENCOUNTER — RESULT FOLLOW UP (OUTPATIENT)
Dept: FAMILY MEDICINE | Facility: CLINIC | Age: 38
End: 2018-06-08

## 2018-06-08 VITALS
WEIGHT: 119 LBS | HEIGHT: 62 IN | HEART RATE: 72 BPM | SYSTOLIC BLOOD PRESSURE: 94 MMHG | OXYGEN SATURATION: 99 % | BODY MASS INDEX: 21.9 KG/M2 | TEMPERATURE: 98.3 F | DIASTOLIC BLOOD PRESSURE: 64 MMHG

## 2018-06-08 DIAGNOSIS — R87.810 CERVICAL HIGH RISK HPV (HUMAN PAPILLOMAVIRUS) TEST POSITIVE: ICD-10-CM

## 2018-06-08 DIAGNOSIS — Z13.6 CARDIOVASCULAR SCREENING; LDL GOAL LESS THAN 130: ICD-10-CM

## 2018-06-08 DIAGNOSIS — Z12.4 SCREENING FOR MALIGNANT NEOPLASM OF CERVIX: ICD-10-CM

## 2018-06-08 DIAGNOSIS — Z00.00 ROUTINE GENERAL MEDICAL EXAMINATION AT A HEALTH CARE FACILITY: Primary | ICD-10-CM

## 2018-06-08 DIAGNOSIS — Z30.431 IUD CHECK UP: ICD-10-CM

## 2018-06-08 DIAGNOSIS — Z11.3 SCREENING EXAMINATION FOR VENEREAL DISEASE: ICD-10-CM

## 2018-06-08 LAB
ALBUMIN UR-MCNC: NEGATIVE MG/DL
APPEARANCE UR: CLEAR
BILIRUB UR QL STRIP: NEGATIVE
CHOLEST SERPL-MCNC: 112 MG/DL
COLOR UR AUTO: YELLOW
GLUCOSE SERPL-MCNC: 83 MG/DL (ref 70–99)
GLUCOSE UR STRIP-MCNC: NEGATIVE MG/DL
HDLC SERPL-MCNC: 46 MG/DL
HGB UR QL STRIP: NEGATIVE
KETONES UR STRIP-MCNC: NEGATIVE MG/DL
LDLC SERPL CALC-MCNC: 57 MG/DL
LEUKOCYTE ESTERASE UR QL STRIP: NEGATIVE
NITRATE UR QL: NEGATIVE
NONHDLC SERPL-MCNC: 66 MG/DL
PH UR STRIP: 6 PH (ref 5–7)
SOURCE: NORMAL
SP GR UR STRIP: 1.01 (ref 1–1.03)
SPECIMEN SOURCE: ABNORMAL
TRIGL SERPL-MCNC: 44 MG/DL
UROBILINOGEN UR STRIP-ACNC: 0.2 EU/DL (ref 0.2–1)
WET PREP SPEC: ABNORMAL

## 2018-06-08 PROCEDURE — 81003 URINALYSIS AUTO W/O SCOPE: CPT | Performed by: NURSE PRACTITIONER

## 2018-06-08 PROCEDURE — 87591 N.GONORRHOEAE DNA AMP PROB: CPT | Performed by: NURSE PRACTITIONER

## 2018-06-08 PROCEDURE — 82947 ASSAY GLUCOSE BLOOD QUANT: CPT | Performed by: NURSE PRACTITIONER

## 2018-06-08 PROCEDURE — G0145 SCR C/V CYTO,THINLAYER,RESCR: HCPCS | Performed by: NURSE PRACTITIONER

## 2018-06-08 PROCEDURE — 87491 CHLMYD TRACH DNA AMP PROBE: CPT | Performed by: NURSE PRACTITIONER

## 2018-06-08 PROCEDURE — 99395 PREV VISIT EST AGE 18-39: CPT | Performed by: NURSE PRACTITIONER

## 2018-06-08 PROCEDURE — 80061 LIPID PANEL: CPT | Performed by: NURSE PRACTITIONER

## 2018-06-08 PROCEDURE — 87624 HPV HI-RISK TYP POOLED RSLT: CPT | Performed by: NURSE PRACTITIONER

## 2018-06-08 PROCEDURE — 87210 SMEAR WET MOUNT SALINE/INK: CPT | Performed by: NURSE PRACTITIONER

## 2018-06-08 PROCEDURE — 36415 COLL VENOUS BLD VENIPUNCTURE: CPT | Performed by: NURSE PRACTITIONER

## 2018-06-08 ASSESSMENT — PAIN SCALES - GENERAL: PAINLEVEL: NO PAIN (0)

## 2018-06-08 NOTE — PATIENT INSTRUCTIONS
Preventive Health Recommendations  Female Ages 26 - 39  Yearly exam:   See your health care provider every year in order to    Review health changes.     Discuss preventive care.      Review your medicines if you your doctor has prescribed any.    Until age 30: Get a Pap test every three years (more often if you have had an abnormal result).    After age 30: Talk to your doctor about whether you should have a Pap test every 3 years or have a Pap test with HPV screening every 5 years.   You do not need a Pap test if your uterus was removed (hysterectomy) and you have not had cancer.  You should be tested each year for STDs (sexually transmitted diseases), if you're at risk.   Talk to your provider about how often to have your cholesterol checked.  If you are at risk for diabetes, you should have a diabetes test (fasting glucose).  Shots: Get a flu shot each year. Get a tetanus shot every 10 years.   Nutrition:     Eat at least 5 servings of fruits and vegetables each day.    Eat whole-grain bread, whole-wheat pasta and brown rice instead of white grains and rice.    Talk to your provider about Calcium and Vitamin D.     Lifestyle    Exercise at least 150 minutes a week (30 minutes a day, 5 days of the week). This will help you control your weight and prevent disease.    Limit alcohol to one drink per day.    No smoking.     Wear sunscreen to prevent skin cancer.    See your dentist every six months for an exam and cleaning.    At [facility], we strive to deliver an exceptional experience to you, every time we see you.  If you receive a survey in the mail, please send us back your thoughts. We really do value your feedback.    Based on your medical history, these are the current health maintenance/preventive care services that you are due for (some may have been done at this visit.)  Health Maintenance Due   Topic Date Due     PAP Q3 YR  09/25/2018       Suggested websites for health  information:  Www.[facility].org : Up to date and easily searchable information on multiple topics.  Www.medlineplus.gov : medication info, interactive tutorials, watch real surgeries online  Www.familydoctor.org : good info from the Academy of Family Physicians  Www.cdc.gov : public health info, travel advisories, epidemics (H1N1)  Www.aap.org : children's health info, normal development, vaccinations  Www.health.[facility].us : [place] dept of health, public health issues in [place], N1N1    Your care team:                            Family Medicine Internal Medicine   MD Philipp Arce MD Shantel Branch-Fleming, MD Katya Georgiev PA-C Megan Hill, APRN CNP    Kody Hernandez MD Pediatrics   Joce Vega, PAMANASA Whipple, MD Seble Lin APRN CNP   MD Alycia Carpenter MD Deborah Mielke, MD Kim Thein, APRN [facility]      Clinic hours: Monday - Thursday 7 am-7 pm; Fridays 7 am-5 pm.   Urgent care: Monday - Friday 11 am-9 pm; Saturday and Sunday 9 am-5 pm.  Pharmacy : Monday -Thursday 8 am-8 pm; Friday 8 am-6 pm; Saturday and Sunday 9 am-5 pm.     Clinic: [phone]   Pharmacy: [phone]

## 2018-06-08 NOTE — MR AVS SNAPSHOT
After Visit Summary   6/8/2018    Liam Ellis    MRN: [id number]           Patient Information     Date Of Birth          1980        Visit Information        Provider Department      6/8/2018 11:40 AM Ana Rosa Harvey APRN [facility]        Today's Diagnoses     Routine general medical examination at a health care facility    -  1    Screening for malignant neoplasm of cervix        CARDIOVASCULAR SCREENING; LDL GOAL LESS THAN 130        IUD check up        Screening examination for venereal disease          Care Instructions      Preventive Health Recommendations  Female Ages 26 - 39  Yearly exam:   See your health care provider every year in order to    Review health changes.     Discuss preventive care.      Review your medicines if you your doctor has prescribed any.    Until age 30: Get a Pap test every three years (more often if you have had an abnormal result).    After age 30: Talk to your doctor about whether you should have a Pap test every 3 years or have a Pap test with HPV screening every 5 years.   You do not need a Pap test if your uterus was removed (hysterectomy) and you have not had cancer.  You should be tested each year for STDs (sexually transmitted diseases), if you're at risk.   Talk to your provider about how often to have your cholesterol checked.  If you are at risk for diabetes, you should have a diabetes test (fasting glucose).  Shots: Get a flu shot each year. Get a tetanus shot every 10 years.   Nutrition:     Eat at least 5 servings of fruits and vegetables each day.    Eat whole-grain bread, whole-wheat pasta and brown rice instead of white grains and rice.    Talk to your provider about Calcium and Vitamin D.     Lifestyle    Exercise at least 150 minutes a week (30 minutes a day, 5 days of the week). This will help you control your weight and prevent disease.    Limit alcohol to one drink per day.    No smoking.     Wear sunscreen to  prevent skin cancer.    See your dentist every six months for an exam and cleaning.    At [facility], we strive to deliver an exceptional experience to you, every time we see you.  If you receive a survey in the mail, please send us back your thoughts. We really do value your feedback.    Based on your medical history, these are the current health maintenance/preventive care services that you are due for (some may have been done at this visit.)  Health Maintenance Due   Topic Date Due     PAP Q3 YR  09/25/2018       Suggested websites for health information:  Www.Xceliant.org : Up to date and easily searchable information on multiple topics.  Www.WeBRAND.gov : medication info, interactive tutorials, watch real surgeries online  Www.familydoctor.org : good info from the Academy of Family Physicians  Www.cdc.gov : public health info, travel advisories, epidemics (H1N1)  Www.aap.org : children's health info, normal development, vaccinations  Www.health.[facility].[place].us : [place] dept of health, public health issues in [place], N1N1    Your care team:                            Family Medicine Internal Medicine   MD Philipp Arce MD Shantel Branch-Fleming, MD Katya Georgiev PA-C Megan Hill, APRN CNP    Kody Hernandez MD Pediatrics   Joce Vega, PAMANASA Whipple, CNP MD Seble Roldan APRN CNP   MD Alycia Carpenter MD Deborah Mielke, MD Kim Thein, APRN [facility]      Clinic hours: Monday - Thursday 7 am-7 pm; Fridays 7 am-5 pm.   Urgent care: Monday - Friday 11 am-9 pm; Saturday and Sunday 9 am-5 pm.  Pharmacy : Monday -Thursday 8 am-8 pm; Friday 8 am-6 pm; Saturday and Sunday 9 am-5 pm.     Clinic: [phone]   Pharmacy: [phone]              Follow-ups after your visit        Who to contact     If you have questions or need follow up information about today's clinic visit or your schedule please contact [facility] directly at  "[phone].  Normal or non-critical lab and imaging results will be communicated to you by MyChart, letter or phone within 4 business days after the clinic has received the results. If you do not hear from us within 7 days, please contact the clinic through [employer] or phone. If you have a critical or abnormal lab result, we will notify you by phone as soon as possible.  Submit refill requests through [employer] or call your pharmacy and they will forward the refill request to us. Please allow 3 business days for your refill to be completed.          Additional Information About Your Visit        [employer] Information     [employer] gives you secure access to your electronic health record. If you see a primary care provider, you can also send messages to your care team and make appointments. If you have questions, please call your primary care clinic.  If you do not have a primary care provider, please call [phone] and they will assist you.        Care EveryWhere ID     This is your Care EveryWhere ID. This could be used by other organizations to access your [place] medical records  [id number]        Your Vitals Were     Pulse Temperature Height Pulse Oximetry BMI (Body Mass Index)       72 98.3  F (36.8  C) (Oral) 5' 1.75\" (1.568 m) 99% 21.94 kg/m2        Blood Pressure from Last 3 Encounters:   06/08/18 94/64   02/15/18 96/67   11/22/17 92/65    Weight from Last 3 Encounters:   06/08/18 119 lb (54 kg)   02/15/18 122 lb 6.4 oz (55.5 kg)   11/22/17 120 lb 8 oz (54.7 kg)              We Performed the Following     CHLAMYDIA TRACHOMATIS PCR     Glucose     HPV High Risk Types DNA Cervical     Lipid Profile (Chol, Trig, HDL, LDL calc)     NEISSERIA GONORRHOEA PCR     Pap imaged thin layer screen with HPV - recommended age 30 - 65 years (select HPV order below)     UA reflex to Microscopic and Culture     Wet prep        Primary Care Provider Office Phone # Fax #    ELLEN Stanford [phone] "       [address] [facility] [address]        Equal Access to Services     MILADYS DAVIES : Hadii aad ku hadariadnaministerio Sourvashi, waaxda luqadaha, qaybta kaalmada jerryjuanolvin, michelle ocasiolaisendy fontanez. So [facility] [phone].    ATENCIÓN: Si habla español, tiene a varela disposición servicios gratuitos de asistencia lingüística. [facility] [phone].    We comply with applicable federal civil rights laws and [place] laws. We do not discriminate on the basis of race, color, national origin, age, disability, sex, sexual orientation, or gender identity.            Thank you!     Thank you for choosing [facility]  for your care. Our goal is always to provide you with excellent care. Hearing back from our patients is one way we can continue to improve our services. Please take a few minutes to complete the written survey that you may receive in the mail after your visit with us. Thank you!             Your Updated Medication List - Protect others around you: Learn how to safely use, store and throw away your medicines at www.disposemymeds.org.          This list is accurate as of 6/8/18 12:06 PM.  Always use your most recent med list.                   Brand Name Dispense Instructions for use Diagnosis    benzonatate 200 MG capsule    TESSALON    20 capsule    Take 1 capsule (200 mg) by mouth 3 times daily as needed for cough    Upper respiratory tract infection, unspecified type       magic mouthwash suspension (diphenhydrAMINE, lidocaine, aluminum-magnesium & simethicone) Susp compounding kit     120 mL    Swish and spit 5-10 mLs in mouth every 6 hours as needed for mild pain 1:1:1 diphen 12/5/5ml:lidocaine visc 2%:maalox    Upper respiratory tract infection, unspecified type

## 2018-06-08 NOTE — LETTER
August 26, 2019      Liam Ellis  [address] [facility]  [place] [address]    Dear ,      We are contacting you by certified letter in regards to your recent pap and HPV test.  Your PAP test result is normal, but your HPV is positive.  Due to ongoing positive HPV, the provider is recommending that you complete the colposcopy procedure. Colposcopy is a procedure done in the clinic and is similar to how we collected your pap sample.  Colposcopy allows us to take a closer look at the cells of the cervix. During this test, a biopsy of the cervix may be taken for further lab testing. The exam and test will help determine if any other treatment needs to be done at this time.  You may take an over the counter pain reliever 1 hour before your scheduled colposcopy. Schedule this for a time when you will not be due to have your period (if you are having regular periods). You should have nothing vaginally for 24 hours before your colposcopy, this includes sex, tampons, douches, and vaginal lubricants. We ask that you arrive 15 minutes early for your colposcopy visit, so that you can complete a urine pregnancy test.   Please call [phone] to schedule a Colposcopy.     If you have additional questions regarding this result, please call our registered nurse, Tori at [phone].    Sincerely,      ELLEN Noguera CNP/[facility]

## 2018-06-08 NOTE — LETTER
June 13, 2019      Liam Ellis  [address] [facility]  [place] [address]    Dear ,      At [place], your health and wellness is our primary concern. That is why we are following up on a positive high risk HPV test from 6/8/18. Your provider had recommended that you have a Pap smear and HPV test completed by 6/8/19. Our records do not show that this has been scheduled.    It is important to complete the follow up that your provider has suggested for you to ensure that there are no worsening changes which may, over time, develop into cancer.      Please contact our office at  [phone] to schedule an appointment for a Pap smear and HPV test at your earliest convenience. If you have questions or concerns, please call the clinic and we will be happy to assist you.    If you have completed the tests outside of [place], please have the results forwarded to our office. We will update the chart for your primary Physician to review before your next annual physical.     Thank you for choosing [place]!    Sincerely,      Your [place] Care Team/ninoska

## 2018-06-08 NOTE — PROGRESS NOTES
SUBJECTIVE:   CC: Lima Ellis is an 38 year old woman who presents for preventive health visit.     Healthy Habits:    Do you get at least three servings of calcium containing foods daily (dairy, green leafy vegetables, etc.)? yes    Amount of exercise or daily activities, outside of work: 45 minutes(s) per day    Problems taking medications regularly not applicable    Medication side effects: No    Have you had an eye exam in the past two years? yes    Do you see a dentist twice per year? 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    Today's PHQ-2 Score:   PHQ-2 ( 1999 Pfizer) 2/24/2017 9/30/2016   Q1: Little interest or pleasure in doing things 3 0   Q2: Feeling down, depressed or hopeless 0 0   PHQ-2 Score 3 0       Abuse: Current or Past(Physical, Sexual or Emotional)- No  Do you feel safe in your environment - Yes    Social History   Substance Use Topics     Smoking status: Never Smoker     Smokeless tobacco: Never Used     Alcohol use No     If you drink alcohol do you typically have >3 drinks per day or >7 drinks per week? Not Applicable                     Reviewed orders with patient.  Reviewed health maintenance and updated orders accordingly - Yes  Labs reviewed in EPIC  BP Readings from Last 3 Encounters:   06/08/18 94/64   02/15/18 96/67   11/22/17 92/65    Wt Readings from Last 3 Encounters:   06/08/18 119 lb (54 kg)   02/15/18 122 lb 6.4 oz (55.5 kg)   11/22/17 120 lb 8 oz (54.7 kg)                  Patient Active Problem List   Diagnosis     CARDIOVASCULAR SCREENING; LDL GOAL LESS THAN 130     TMJ (temporomandibular joint syndrome)     Appendicitis     Closed displaced fracture of base of metacarpal bone     Need for Tdap vaccination     Encounter for insertion of intrauterine contraceptive device (IUD)     Past Surgical History:   Procedure  Laterality Date     APPENDECTOMY        SECTION N/A 2017    Procedure:  SECTION;;  Surgeon: Tracy Bowser MD;  Location: [facility]       Social History   Substance Use Topics     Smoking status: Never Smoker     Smokeless tobacco: Never Used     Alcohol use No     Family History   Problem Relation Age of Onset     Hypertension Mother      DIABETES Mother      Glaucoma No family hx of      Macular Degeneration No family hx of            Mammogram not appropriate for this patient based on age.    Pertinent mammograms are reviewed under the imaging tab.  History of abnormal Pap smear: NO - age 30-65 PAP every 5 years with negative HPV co-testing recommended    Reviewed and updated as needed this visit by clinical staff  Tobacco  Allergies  Meds         Reviewed and updated as needed this visit by Provider  Allergies        Past Medical History:   Diagnosis Date     Diabetes (H)      TMJ (temporomandibular joint syndrome) 12/10/2013      Past Surgical History:   Procedure Laterality Date     APPENDECTOMY        SECTION N/A 2017    Procedure:  SECTION;;  Surgeon: Tracy Bowser MD;  Location: [facility]       ROS:  CONSTITUTIONAL: NEGATIVE for fever, chills, change in weight  INTEGUMENTARU/SKIN: NEGATIVE for worrisome rashes, moles or lesions  EYES: NEGATIVE for vision changes or irritation  ENT: NEGATIVE for ear, mouth and throat problems  RESP: NEGATIVE for significant cough or SOB  BREAST: NEGATIVE for masses, tenderness or discharge  CV: NEGATIVE for chest pain, palpitations or peripheral edema  GI: NEGATIVE for nausea, abdominal pain, heartburn, or change in bowel habits  : NEGATIVE for unusual urinary or vaginal symptoms. Periods are regular.  MUSCULOSKELETAL: NEGATIVE for significant arthralgias or myalgia  NEURO: NEGATIVE for weakness, dizziness or paresthesias  PSYCHIATRIC: NEGATIVE for changes in mood or affect    OBJECTIVE:   BP 94/64 (BP Location: Left arm, Patient  "Position: Chair, Cuff Size: Adult Regular)  Pulse 72  Temp 98.3  F (36.8  C) (Oral)  Ht 5' 1.75\" (1.568 m)  Wt 119 lb (54 kg)  SpO2 99%  BMI 21.94 kg/m2  EXAM:  GENERAL: healthy, alert and no distress  EYES: Eyes grossly normal to inspection, PERRL and conjunctivae and sclerae normal  HENT: ear canals and TM's normal, nose and mouth without ulcers or lesions  NECK: no adenopathy, no asymmetry, masses, or scars and thyroid normal to palpation  RESP: lungs clear to auscultation - no rales, rhonchi or wheezes  BREAST: normal without masses, tenderness or nipple discharge and no palpable axillary masses or adenopathy  CV: regular rate and rhythm, normal S1 S2, no S3 or S4, no murmur, click or rub, no peripheral edema and peripheral pulses strong  ABDOMEN: soft, nontender, no hepatosplenomegaly, no masses and bowel sounds normal   (female): normal female external genitalia, normal urethral meatus, vaginal mucosa pink, moist, well rugated, and normal cervix/adnexa/uterus without masses or discharge, iud string visible in os, pap, wet prep, GC obtained  MS: no gross musculoskeletal defects noted, no edema  SKIN: no suspicious lesions or rashes  NEURO: Normal strength and tone, mentation intact and speech normal  PSYCH: mentation appears normal, affect normal/bright  LYMPH: no cervical, supraclavicular, axillary, or inguinal adenopathy    ASSESSMENT/PLAN:   1. Routine general medical examination at a health care facility  Form completed for school  - Glucose    2. Screening for malignant neoplasm of cervix    - Pap imaged thin layer screen with HPV - recommended age 30 - 65 years (select HPV order below)  - HPV High Risk Types DNA Cervical    3. CARDIOVASCULAR SCREENING; LDL GOAL LESS THAN 130  Low chol diet encouraged as well as regular exercise.  - Lipid Profile (Chol, Trig, HDL, LDL calc)    4. IUD check up  IUD string visible in os.    5. Screening examination for venereal disease  Safer sex practices reviewed- " "always use condoms.  - Wet prep  - UA reflex to Microscopic and Culture  - NEISSERIA GONORRHOEA PCR  - CHLAMYDIA TRACHOMATIS PCR    Patient has history of + PPD, had normal chest film 10/16/15.  She is not wanting repeat chest film unless her school won't accept the chwst film from 2015.  She'll give them her result and will jose if she  Needs repeat chest x ray.    COUNSELING:   Reviewed preventive health counseling, as reflected in patient instructions       Regular exercise       Healthy diet/nutrition       Contraception       Safe sex practices/STD prevention         reports that she has never smoked. She has never used smokeless tobacco.    Estimated body mass index is 21.94 kg/(m^2) as calculated from the following:    Height as of this encounter: 5' 1.75\" (1.568 m).    Weight as of this encounter: 119 lb (54 kg).       Counseling Resources:  ATP IV Guidelines  Pooled Cohorts Equation Calculator  Breast Cancer Risk Calculator  FRAX Risk Assessment  ICSI Preventive Guidelines  Dietary Guidelines for Americans, 2010  USDA's MyPlate  ASA Prophylaxis  Lung CA Screening    ELLEN Villanueva CNP  [facility]  "

## 2018-06-10 LAB
C TRACH DNA SPEC QL NAA+PROBE: NEGATIVE
N GONORRHOEA DNA SPEC QL NAA+PROBE: NEGATIVE
SPECIMEN SOURCE: NORMAL
SPECIMEN SOURCE: NORMAL

## 2018-06-12 LAB
COPATH REPORT: NORMAL
PAP: NORMAL

## 2018-06-14 LAB
FINAL DIAGNOSIS: ABNORMAL
HPV HR 12 DNA CVX QL NAA+PROBE: POSITIVE
HPV16 DNA SPEC QL NAA+PROBE: NEGATIVE
HPV18 DNA SPEC QL NAA+PROBE: NEGATIVE
SPECIMEN DESCRIPTION: ABNORMAL
SPECIMEN SOURCE CVX/VAG CYTO: ABNORMAL

## 2018-06-15 NOTE — PROGRESS NOTES
6/8/18 NIL pap, + HR HPV (not 16 or 18). Plan: cotest in 1 yr.  6/13/19 My Chart cotest reminder message sent ([facility])  6/21/19 NIL, +HR HPV, not 16/18. Plan colp by 9/21/19. (LN)  06/27/19:Msg left to call back. (sk)  7/5/19 left msg and sent My Chart result note.  7/22/19 Result letter sent per RN request ([facility])  08/26/19 Result letter sent certified: [phone] ([facility])  09/16/19 Per USPS tracking, certified letter delivered 09/12/19. Verification sent to [facility] for scanning into pts chart. ([facility])  10/4/19 [place] bx: Chronic cervicitis with reactive squamous metaplasia, negative for dysplasia.   Pap: NIL. Plan: cotest in 1 yr.

## 2018-11-20 ENCOUNTER — TELEPHONE (OUTPATIENT)
Dept: OBGYN | Facility: CLINIC | Age: 38
End: 2018-11-20

## 2018-11-20 NOTE — TELEPHONE ENCOUNTER
"Pt calling stating she would like to set up procedure to have her IUD removed as she has gained 7 pounds. Also she would like to check to see if her \"tubes have opened up\" Please call pt at [phone] to advise, OK to MICHELA Ferreira, [facility]  Diabetes and Nutrition Scheduling      "

## 2018-11-20 NOTE — TELEPHONE ENCOUNTER
"TC to patient. LMTC. Pt can discuss seeing if her \"tubes have opened up\" at appt to have IUD removed. Please schedule IUD removal.   Chata Perez RN-BSN    "

## 2018-12-14 ENCOUNTER — OFFICE VISIT (OUTPATIENT)
Dept: OBGYN | Facility: CLINIC | Age: 38
End: 2018-12-14
Payer: COMMERCIAL

## 2018-12-14 VITALS
DIASTOLIC BLOOD PRESSURE: 66 MMHG | WEIGHT: 126.8 LBS | BODY MASS INDEX: 23.38 KG/M2 | HEART RATE: 77 BPM | SYSTOLIC BLOOD PRESSURE: 93 MMHG

## 2018-12-14 DIAGNOSIS — Z98.51 H/O TUBAL LIGATION: ICD-10-CM

## 2018-12-14 DIAGNOSIS — Z31.9 PATIENT DESIRES PREGNANCY: Primary | ICD-10-CM

## 2018-12-14 DIAGNOSIS — Z30.432 ENCOUNTER FOR IUD REMOVAL: ICD-10-CM

## 2018-12-14 PROCEDURE — 99213 OFFICE O/P EST LOW 20 MIN: CPT | Mod: 25 | Performed by: OBSTETRICS & GYNECOLOGY

## 2018-12-14 PROCEDURE — 58301 REMOVE INTRAUTERINE DEVICE: CPT | Performed by: OBSTETRICS & GYNECOLOGY

## 2018-12-14 NOTE — PROGRESS NOTES
"Chief Complaint   Patient presents with     IUD     removal     Consult     \"talk about if tubes have opened up\"       Initial BP 93/66   Pulse 77   Wt 57.5 kg (126 lb 12.8 oz)   BMI 23.38 kg/m   Estimated body mass index is 23.38 kg/m  as calculated from the following:    Height as of 18: 1.568 m (5' 1.75\").    Weight as of this encounter: 57.5 kg (126 lb 12.8 oz).  BP completed using cuff size: regular    Questioned patient about current smoking habits.  Pt. has never smoked.          The following HM Due: NONE      The following patient reported/Care Every where data was sent to:  P ABSTRACT QUALITY INITIATIVES [22255]        patient has appointment for today  Rose Phippsshamar is a 38 year old  who is about 19 months postpartum.  She would like her IUD out today as they are planning to try for a second child.  She had a c/s with her first and extension into the right broad ligament which required stitching around the fallopian tube.  She is wondering if she should have imaging to see if her tube is open or not.  She has not started a prenatal vitamin.    O: BP 93/66   Pulse 77   Wt 57.5 kg (126 lb 12.8 oz)   BMI 23.38 kg/m      Gen: NAD  Abd: SNT  Pelvic: normal vulva and vagina.  Cervix without lesions.  IUD strrings visible  Ex; no calf tend    Procedure:  After informed consent was obtained, the IUD strings were grasped with a ring forcep, and the IUD was easily removed.  No bleeding was seen after removal.  The speculum was removed. She tolerated the procedure well.    A/P: desires pregnancy, h/o right tubal ligation   IUD removed easily.  Recommended starting a prenatal vitamin.  I explained that based on the stitch that was placed, it is likely the right tube is occluded.  But, I reassured her this should not negatively impact her fertility.  We discussed HSG as a way to see if tube is open, she is wondering if covered by insurance. For now, she will " contact her insurance and let me know if she desires HSG, but she may just wait and go forward with it if she doesn't conceive after trying for 6 months.  We discussed the increased risk of ectopic pregnancy in her situation, so I instructed her to call with positive UPT so we can do HCG and early us to determine pregnancy location.  Questions answered.    MELIDA ANDINO MD      In addition to IUD removal, 15 min was spent in face to face discussion about tubal patency, fertility and risk of ectopic pregnancy.

## 2019-06-21 ENCOUNTER — OFFICE VISIT (OUTPATIENT)
Dept: FAMILY MEDICINE | Facility: CLINIC | Age: 39
End: 2019-06-21
Payer: COMMERCIAL

## 2019-06-21 VITALS
SYSTOLIC BLOOD PRESSURE: 105 MMHG | WEIGHT: 124 LBS | TEMPERATURE: 97.8 F | HEART RATE: 69 BPM | HEIGHT: 61 IN | RESPIRATION RATE: 18 BRPM | BODY MASS INDEX: 23.41 KG/M2 | DIASTOLIC BLOOD PRESSURE: 69 MMHG | OXYGEN SATURATION: 99 %

## 2019-06-21 DIAGNOSIS — Z00.00 ROUTINE GENERAL MEDICAL EXAMINATION AT A HEALTH CARE FACILITY: Primary | ICD-10-CM

## 2019-06-21 DIAGNOSIS — R53.83 FATIGUE, UNSPECIFIED TYPE: ICD-10-CM

## 2019-06-21 DIAGNOSIS — B97.7 HIGH RISK HPV INFECTION: ICD-10-CM

## 2019-06-21 DIAGNOSIS — Z13.6 CARDIOVASCULAR SCREENING; LDL GOAL LESS THAN 130: ICD-10-CM

## 2019-06-21 DIAGNOSIS — N63.20 LEFT BREAST LUMP: ICD-10-CM

## 2019-06-21 PROBLEM — Z30.430 ENCOUNTER FOR INSERTION OF INTRAUTERINE CONTRACEPTIVE DEVICE (IUD): Status: RESOLVED | Noted: 2017-10-10 | Resolved: 2019-06-21

## 2019-06-21 LAB
ALBUMIN SERPL-MCNC: 4 G/DL (ref 3.4–5)
ALP SERPL-CCNC: 63 U/L (ref 40–150)
ALT SERPL W P-5'-P-CCNC: 19 U/L (ref 0–50)
ANION GAP SERPL CALCULATED.3IONS-SCNC: 6 MMOL/L (ref 3–14)
AST SERPL W P-5'-P-CCNC: 15 U/L (ref 0–45)
BASOPHILS # BLD AUTO: 0 10E9/L (ref 0–0.2)
BASOPHILS NFR BLD AUTO: 0.2 %
BILIRUB SERPL-MCNC: 0.4 MG/DL (ref 0.2–1.3)
BUN SERPL-MCNC: 10 MG/DL (ref 7–30)
CALCIUM SERPL-MCNC: 8.6 MG/DL (ref 8.5–10.1)
CHLORIDE SERPL-SCNC: 107 MMOL/L (ref 94–109)
CHOLEST SERPL-MCNC: 111 MG/DL
CO2 SERPL-SCNC: 26 MMOL/L (ref 20–32)
CREAT SERPL-MCNC: 0.58 MG/DL (ref 0.52–1.04)
DIFFERENTIAL METHOD BLD: NORMAL
EOSINOPHIL # BLD AUTO: 0.2 10E9/L (ref 0–0.7)
EOSINOPHIL NFR BLD AUTO: 2.9 %
ERYTHROCYTE [DISTWIDTH] IN BLOOD BY AUTOMATED COUNT: 12.8 % (ref 10–15)
FERRITIN SERPL-MCNC: 46 NG/ML (ref 12–150)
GFR SERPL CREATININE-BSD FRML MDRD: >90 ML/MIN/{1.73_M2}
GLUCOSE SERPL-MCNC: 75 MG/DL (ref 70–99)
HCT VFR BLD AUTO: 36.5 % (ref 35–47)
HDLC SERPL-MCNC: 49 MG/DL
HGB BLD-MCNC: 12.1 G/DL (ref 11.7–15.7)
IRON SATN MFR SERPL: 28 % (ref 15–46)
IRON SERPL-MCNC: 69 UG/DL (ref 35–180)
LDLC SERPL CALC-MCNC: 54 MG/DL
LYMPHOCYTES # BLD AUTO: 1.6 10E9/L (ref 0.8–5.3)
LYMPHOCYTES NFR BLD AUTO: 31 %
MCH RBC QN AUTO: 30.2 PG (ref 26.5–33)
MCHC RBC AUTO-ENTMCNC: 33.2 G/DL (ref 31.5–36.5)
MCV RBC AUTO: 91 FL (ref 78–100)
MONOCYTES # BLD AUTO: 0.5 10E9/L (ref 0–1.3)
MONOCYTES NFR BLD AUTO: 9.4 %
NEUTROPHILS # BLD AUTO: 2.9 10E9/L (ref 1.6–8.3)
NEUTROPHILS NFR BLD AUTO: 56.5 %
NONHDLC SERPL-MCNC: 62 MG/DL
PLATELET # BLD AUTO: 285 10E9/L (ref 150–450)
POTASSIUM SERPL-SCNC: 3.8 MMOL/L (ref 3.4–5.3)
PROT SERPL-MCNC: 8.2 G/DL (ref 6.8–8.8)
RBC # BLD AUTO: 4.01 10E12/L (ref 3.8–5.2)
SODIUM SERPL-SCNC: 139 MMOL/L (ref 133–144)
T4 FREE SERPL-MCNC: 0.74 NG/DL (ref 0.76–1.46)
TIBC SERPL-MCNC: 247 UG/DL (ref 240–430)
TRIGL SERPL-MCNC: 42 MG/DL
TSH SERPL DL<=0.005 MIU/L-ACNC: 9.72 MU/L (ref 0.4–4)
WBC # BLD AUTO: 5.2 10E9/L (ref 4–11)

## 2019-06-21 PROCEDURE — 82728 ASSAY OF FERRITIN: CPT | Performed by: NURSE PRACTITIONER

## 2019-06-21 PROCEDURE — G0145 SCR C/V CYTO,THINLAYER,RESCR: HCPCS | Performed by: NURSE PRACTITIONER

## 2019-06-21 PROCEDURE — 80053 COMPREHEN METABOLIC PANEL: CPT | Performed by: NURSE PRACTITIONER

## 2019-06-21 PROCEDURE — 80061 LIPID PANEL: CPT | Performed by: NURSE PRACTITIONER

## 2019-06-21 PROCEDURE — 87624 HPV HI-RISK TYP POOLED RSLT: CPT | Performed by: NURSE PRACTITIONER

## 2019-06-21 PROCEDURE — 83550 IRON BINDING TEST: CPT | Performed by: NURSE PRACTITIONER

## 2019-06-21 PROCEDURE — 83540 ASSAY OF IRON: CPT | Performed by: NURSE PRACTITIONER

## 2019-06-21 PROCEDURE — 36415 COLL VENOUS BLD VENIPUNCTURE: CPT | Performed by: NURSE PRACTITIONER

## 2019-06-21 PROCEDURE — 85025 COMPLETE CBC W/AUTO DIFF WBC: CPT | Performed by: NURSE PRACTITIONER

## 2019-06-21 PROCEDURE — 99395 PREV VISIT EST AGE 18-39: CPT | Performed by: NURSE PRACTITIONER

## 2019-06-21 PROCEDURE — 84439 ASSAY OF FREE THYROXINE: CPT | Performed by: NURSE PRACTITIONER

## 2019-06-21 PROCEDURE — 84443 ASSAY THYROID STIM HORMONE: CPT | Performed by: NURSE PRACTITIONER

## 2019-06-21 ASSESSMENT — MIFFLIN-ST. JEOR: SCORE: 1166.9

## 2019-06-21 ASSESSMENT — PAIN SCALES - GENERAL: PAINLEVEL: NO PAIN (0)

## 2019-06-21 NOTE — LETTER
July 22, 2019      Liam Ellis  [address] [facility]  [place] [address]    Dear ,      We are contacting you in writing because we have been unable to reach you by phone on 6/27/19 and 7/5/19    This letter in in regards to your recent pap and HPV test. Your PAP test result is normal, but your HPV is positive.  Due to ongoing positive HPV, the provider is recommending that you complete the colposcopy procedure. Colposcopy is a procedure done in the clinic and is similar to how we collected your pap sample.  Colposcopy allows us to take a closer look at the cells of the cervix. During this test, a biopsy of the cervix may be taken for further lab testing. The exam and test will help determine if any other treatment needs to be done at this time.  You may take an over the counter pain reliever 1 hour before your scheduled colposcopy. Schedule this for a time when you will not be due to have your period (if you are having regular periods). You should have nothing vaginally for 24 hours before your colposcopy, this includes sex, tampons, douches, and vaginal lubricants. We ask that you arrive 15 minutes early for your colposcopy visit, so that you can complete a urine pregnancy test.   Please call [phone] to schedule a Colposcopy.     If you have additional questions regarding this result, please call our registered nurse, Tori at [phone].    Sincerely,      ELLEN Noguera CNP/[facility]

## 2019-06-21 NOTE — PROGRESS NOTES
SUBJECTIVE:   CC: Liam Ellis is an 39 year old woman who presents for preventive health visit.     Healthy Habits:    Do you get at least three servings of calcium containing foods daily (dairy, green leafy vegetables, etc.)? no    Amount of exercise or daily activities, outside of work: tries to go for walks with kids in [facility] 2-3 times per week, sometimes less    Problems taking medications regularly not applicable    Medication side effects: No    Have you had an eye exam in the past two years? Yes was told eyes tired or vision may be related to screen time or wear reading glasses as needed    Do you see a dentist twice per year? yes    Do you have sleep apnea, excessive snoring or daytime drowsiness?no    Fatigue for last few months. Sister with thyroid issues and wants to be rechecked.    Found sore lump in left breast around 9 o'clock. No nipple discharge.    Today's PHQ-2 Score:   PHQ-2 ( 1999 Pfizer) 6/21/2019 2/24/2017   Q1: Little interest or pleasure in doing things 0 3   Q2: Feeling down, depressed or hopeless 0 0   PHQ-2 Score 0 3       Abuse: Current or Past(Physical, Sexual or Emotional)- No  Do you feel safe in your environment? Yes    Social History     Tobacco Use     Smoking status: Never Smoker     Smokeless tobacco: Never Used   Substance Use Topics     Alcohol use: No     Alcohol/week: 0.0 oz     If you drink alcohol do you typically have >3 drinks per day or >7 drinks per week? No                     Reviewed orders with patient.  Reviewed health maintenance and updated orders accordingly - Yes  Labs reviewed in EPIC  BP Readings from Last 3 Encounters:   06/21/19 105/69   12/14/18 93/66   06/08/18 94/64    Wt Readings from Last 3 Encounters:   06/21/19 56.2 kg (124 lb)   12/14/18 57.5 kg (126 lb 12.8 oz)   06/08/18 54 kg (119 lb)                  Patient Active Problem List   Diagnosis     CARDIOVASCULAR SCREENING; LDL GOAL LESS THAN 130     TMJ (temporomandibular joint syndrome)      Appendicitis     Closed displaced fracture of base of metacarpal bone     Need for Tdap vaccination     Past Surgical History:   Procedure Laterality Date     APPENDECTOMY        SECTION N/A 2017    Procedure:  SECTION;;  Surgeon: Tracy Bowser MD;  Location:  L+D       Social History     Tobacco Use     Smoking status: Never Smoker     Smokeless tobacco: Never Used   Substance Use Topics     Alcohol use: No     Alcohol/week: 0.0 oz     Family History   Problem Relation Age of Onset     Hypertension Mother      Diabetes Mother      Glaucoma No family hx of      Macular Degeneration No family hx of          No current outpatient medications on file.     No Known Allergies    Mammogram not appropriate for this patient based on age.    Pertinent mammograms are reviewed under the imaging tab.  History of abnormal Pap smear:   YES - updated in Problem List and Health Maintenance accordingly  Last 3 Pap Results:   PAP (no units)   Date Value   2018 NIL   2015 NIL   2011 NIL     Last 3 Pap and HPV Results:   PAP / HPV Latest Ref Rng & Units 2018   PAP - NIL NIL NIL   HPV 16 DNA NEG:Negative Negative - -   HPV 18 DNA NEG:Negative Negative - -   OTHER HR HPV NEG:Negative Positive(A) - -     PAP / HPV Latest Ref Rng & Units 2018   PAP - NIL NIL NIL   HPV 16 DNA NEG:Negative Negative - -   HPV 18 DNA NEG:Negative Negative - -   OTHER HR HPV NEG:Negative Positive(A) - -     Reviewed and updated as needed this visit by clinical staff  Tobacco  Allergies  Meds  Med Hx  Surg Hx  Fam Hx  Soc Hx        Reviewed and updated as needed this visit by Provider        Past Medical History:   Diagnosis Date     Diabetes (H)      TMJ (temporomandibular joint syndrome) 12/10/2013      Past Surgical History:   Procedure Laterality Date     APPENDECTOMY        SECTION N/A 2017    Procedure:  SECTION;;  Surgeon: Tracy Bowser,  "MD;  Location:  L+D     OB History    Para Term  AB Living   1 1 1 0 0 1   SAB TAB Ectopic Multiple Live Births   0 0 0 0 1      # Outcome Date GA Lbr David/2nd Weight Sex Delivery Anes PTL Lv   1 Term 17 39w2d 20:10 / 04:00 3.005 kg (6 lb 10 oz) M CS-LTranv EPI N TACOS      Birth Comments: Term, AGA male born to 36 yo  mother.   C/S due to failure to progress, second stage.     Hearing screen passed bilaterally.   CCHD screen passed.   Hep B 5/15/17  Bili LIR 6.3 at 28 hours.       Complications: Failure to Progress in Second Stage      Name: HANNAH CALVO      Apgar1: 5  Apgar5: 8       ROS:  CONSTITUTIONAL: NEGATIVE for fever, chills, change in weight  INTEGUMENTARU/SKIN: NEGATIVE for worrisome rashes, moles or lesions  EYES: NEGATIVE for vision changes or irritation  ENT: NEGATIVE for ear, mouth and throat problems  RESP: NEGATIVE for significant cough or SOB  BREAST: NEGATIVE for masses, tenderness or discharge  CV: NEGATIVE for chest pain, palpitations or peripheral edema  GI: NEGATIVE for nausea, abdominal pain, heartburn, or change in bowel habits  : NEGATIVE for unusual urinary or vaginal symptoms. Periods are regular.  MUSCULOSKELETAL: NEGATIVE for significant arthralgias or myalgia  NEURO: NEGATIVE for weakness, dizziness or paresthesias  PSYCHIATRIC: NEGATIVE for changes in mood or affect    OBJECTIVE:   /69 (BP Location: Right arm, Patient Position: Chair, Cuff Size: Adult Regular)   Pulse 69   Temp 97.8  F (36.6  C) (Oral)   Resp 18   Ht 1.537 m (5' 0.5\")   Wt 56.2 kg (124 lb)   LMP 2019 (Approximate)   SpO2 99%   BMI 23.82 kg/m    EXAM:  GENERAL: healthy, alert and no distress  EYES: Eyes grossly normal to inspection, PERRL and conjunctivae and sclerae normal  HENT: ear canals and TM's normal, nose and mouth without ulcers or lesions  NECK: no adenopathy, no asymmetry, masses, or scars and thyroid normal to palpation  RESP: lungs clear to auscultation " - no rales, rhonchi or wheezes  BREAST: mass left breast at approx 9 o'clock  CV: regular rate and rhythm, normal S1 S2, no S3 or S4, no murmur, click or rub, no peripheral edema and peripheral pulses strong  ABDOMEN: soft, nontender, no hepatosplenomegaly, no masses and bowel sounds normal   (female): normal female external genitalia, normal urethral meatus, vaginal mucosa pink, moist, well rugated, and normal cervix/adnexa/uterus without masses or discharge  MS: no gross musculoskeletal defects noted, no edema  SKIN: no suspicious lesions or rashes  NEURO: Normal strength and tone, mentation intact and speech normal  PSYCH: mentation appears normal, affect normal/bright    Diagnostic Test Results:  Results for orders placed or performed in visit on 06/21/19 (from the past 24 hour(s))   Comprehensive metabolic panel (BMP + Alb, Alk Phos, ALT, AST, Total. Bili, TP)   Result Value Ref Range    Sodium 139 133 - 144 mmol/L    Potassium 3.8 3.4 - 5.3 mmol/L    Chloride 107 94 - 109 mmol/L    Carbon Dioxide 26 20 - 32 mmol/L    Anion Gap 6 3 - 14 mmol/L    Glucose 75 70 - 99 mg/dL    Urea Nitrogen 10 7 - 30 mg/dL    Creatinine 0.58 0.52 - 1.04 mg/dL    GFR Estimate >90 >60 mL/min/[1.73_m2]    GFR Estimate If Black >90 >60 mL/min/[1.73_m2]    Calcium 8.6 8.5 - 10.1 mg/dL    Bilirubin Total 0.4 0.2 - 1.3 mg/dL    Albumin 4.0 3.4 - 5.0 g/dL    Protein Total 8.2 6.8 - 8.8 g/dL    Alkaline Phosphatase 63 40 - 150 U/L    ALT 19 0 - 50 U/L    AST 15 0 - 45 U/L   CBC with platelets differential   Result Value Ref Range    WBC 5.2 4.0 - 11.0 10e9/L    RBC Count 4.01 3.8 - 5.2 10e12/L    Hemoglobin 12.1 11.7 - 15.7 g/dL    Hematocrit 36.5 35.0 - 47.0 %    MCV 91 78 - 100 fl    MCH 30.2 26.5 - 33.0 pg    MCHC 33.2 31.5 - 36.5 g/dL    RDW 12.8 10.0 - 15.0 %    Platelet Count 285 150 - 450 10e9/L    % Neutrophils 56.5 %    % Lymphocytes 31.0 %    % Monocytes 9.4 %    % Eosinophils 2.9 %    % Basophils 0.2 %    Absolute Neutrophil  "2.9 1.6 - 8.3 10e9/L    Absolute Lymphocytes 1.6 0.8 - 5.3 10e9/L    Absolute Monocytes 0.5 0.0 - 1.3 10e9/L    Absolute Eosinophils 0.2 0.0 - 0.7 10e9/L    Absolute Basophils 0.0 0.0 - 0.2 10e9/L    Diff Method Automated Method    TSH with free T4 reflex   Result Value Ref Range    TSH 9.72 (H) 0.40 - 4.00 mU/L   Iron and iron binding capacity   Result Value Ref Range    Iron 69 35 - 180 ug/dL    Iron Binding Cap 247 240 - 430 ug/dL    Iron Saturation Index 28 15 - 46 %   Ferritin   Result Value Ref Range    Ferritin 46 12 - 150 ng/mL   Lipid panel reflex to direct LDL Fasting   Result Value Ref Range    Cholesterol 111 <200 mg/dL    Triglycerides 42 <150 mg/dL    HDL Cholesterol 49 (L) >49 mg/dL    LDL Cholesterol Calculated 54 <100 mg/dL    Non HDL Cholesterol 62 <130 mg/dL   T4 free   Result Value Ref Range    T4 Free 0.74 (L) 0.76 - 1.46 ng/dL       ASSESSMENT/PLAN:   1. Routine general medical examination at a health care facility    2. High risk HPV infection  - Pap imaged thin layer screen with HPV - recommended age 30 - 65 years (select HPV order below)  - HPV High Risk Types DNA Cervical    3. Fatigue, unspecified type  Checking labs - as above. Requested patient to come in for further eval - see results note.  - Comprehensive metabolic panel (BMP + Alb, Alk Phos, ALT, AST, Total. Bili, TP)  - CBC with platelets differential  - TSH with free T4 reflex  - Iron and iron binding capacity  - Ferritin    4. CARDIOVASCULAR SCREENING; LDL GOAL LESS THAN 130  fasting  - Lipid panel reflex to direct LDL Fasting    5. Left breast lump  Likely cyst but will get below.  - MA Diagnostic Digital Bilateral; Future  - US Breast Left Complete 4 Quadrants; Future    COUNSELING:   Reviewed preventive health counseling, as reflected in patient instructions    Estimated body mass index is 23.82 kg/m  as calculated from the following:    Height as of this encounter: 1.537 m (5' 0.5\").    Weight as of this encounter: 56.2 kg " (124 lb).         reports that she has never smoked. She has never used smokeless tobacco.     Return precautions discussed, including when to seek urgent/emergent care.    Patient verbalizes understanding and agrees with plan of care. Patient stable for discharge.        Counseling Resources:  ATP IV Guidelines  Pooled Cohorts Equation Calculator  Breast Cancer Risk Calculator  FRAX Risk Assessment  ICSI Preventive Guidelines  Dietary Guidelines for Americans, 2010  USDA's MyPlate  ASA Prophylaxis  Lung CA Screening    ELLEN Noguera CNP  [facility]

## 2019-06-21 NOTE — PATIENT INSTRUCTIONS
At [facility], we strive to deliver an exceptional experience to you, every time we see you.  If you receive a survey in the mail, please send us back your thoughts. We really do value your feedback.    Based on your medical history, these are the current health maintenance/preventive care services that you are due for (some may have been done at this visit.)  Health Maintenance Due   Topic Date Due     PHQ-2  01/01/2019     EYE EXAM  05/30/2019     PREVENTIVE CARE VISIT  06/08/2019     HPV  06/08/2019         Suggested websites for health information:  Www.[employer].org : Up to date and easily searchable information on multiple topics.  Www.medlineplus.gov : medication info, interactive tutorials, watch real surgeries online  Www.familydoctor.org : good info from the Academy of Family Physicians  Www.cdc.gov : public health info, travel advisories, epidemics (H1N1)  Www.aap.org : children's health info, normal development, vaccinations  Www.health.[facility].[place].us : [place] dept of health, public health issues in [place], N1N1    Your care team:                            Family Medicine Internal Medicine   MD Philipp Arce MD Shantel Branch-Fleming, MD Katya Georgiev PA-C Nam Ho, MD Pediatrics   BELINDA Smith, RACHEL HUGHES CNP   MD Alycia Carpenter MD Deborah Mielke, MD Kim Thein, APRN CNP      Clinic hours: Monday - Thursday 7 am-7 pm; Fridays 7 am-5 pm.   Urgent care: Monday - Friday 11 am-9 pm; Saturday and Sunday 9 am-5 pm.  Pharmacy : Monday -Thursday 8 am-8 pm; Friday 8 am-6 pm; Saturday and Sunday 9 am-5 pm.     Clinic: [phone]   Pharmacy: [phone]      Preventive Health Recommendations  Female Ages 26 - 39  Yearly exam:   See your health care provider every year in order to    Review health changes.     Discuss preventive care.      Review your medicines if you your doctor has prescribed any.    Until age 30:  Get a Pap test every three years (more often if you have had an abnormal result).    After age 30: Talk to your doctor about whether you should have a Pap test every 3 years or have a Pap test with HPV screening every 5 years.   You do not need a Pap test if your uterus was removed (hysterectomy) and you have not had cancer.  You should be tested each year for STDs (sexually transmitted diseases), if you're at risk.   Talk to your provider about how often to have your cholesterol checked.  If you are at risk for diabetes, you should have a diabetes test (fasting glucose).  Shots: Get a flu shot each year. Get a tetanus shot every 10 years.   Nutrition:     Eat at least 5 servings of fruits and vegetables each day.    Eat whole-grain bread, whole-wheat pasta and brown rice instead of white grains and rice.    Get adequate Calcium and Vitamin D.     Lifestyle    Exercise at least 150 minutes a week (30 minutes a day, 5 days of the week). This will help you control your weight and prevent disease.    Limit alcohol to one drink per day.    No smoking.     Wear sunscreen to prevent skin cancer.    See your dentist every six months for an exam and cleaning.

## 2019-06-22 NOTE — RESULT ENCOUNTER NOTE
Liam,  Your labs show low hemoglobin and elevated TSH (thyroid) which could indicate hypothyroidism. Both could be contributing to your fatigue. I recommend follow up visit to discuss, further evaluation and start treatment.  ELLEN Noguera CNP

## 2019-06-25 LAB
COPATH REPORT: NORMAL
PAP: NORMAL

## 2019-06-26 ENCOUNTER — OFFICE VISIT (OUTPATIENT)
Dept: FAMILY MEDICINE | Facility: CLINIC | Age: 39
End: 2019-06-26
Payer: COMMERCIAL

## 2019-06-26 VITALS
SYSTOLIC BLOOD PRESSURE: 103 MMHG | HEART RATE: 87 BPM | BODY MASS INDEX: 23.6 KG/M2 | OXYGEN SATURATION: 99 % | HEIGHT: 61 IN | WEIGHT: 125 LBS | DIASTOLIC BLOOD PRESSURE: 68 MMHG | RESPIRATION RATE: 16 BRPM | TEMPERATURE: 98.1 F

## 2019-06-26 DIAGNOSIS — R79.89 ABNORMAL SERUM THYROID STIMULATING HORMONE (TSH) LEVEL: Primary | ICD-10-CM

## 2019-06-26 LAB
FINAL DIAGNOSIS: ABNORMAL
HPV HR 12 DNA CVX QL NAA+PROBE: POSITIVE
HPV16 DNA SPEC QL NAA+PROBE: NEGATIVE
HPV18 DNA SPEC QL NAA+PROBE: NEGATIVE
SPECIMEN DESCRIPTION: ABNORMAL
SPECIMEN SOURCE CVX/VAG CYTO: ABNORMAL
TSH SERPL DL<=0.005 MIU/L-ACNC: 3.15 MU/L (ref 0.4–4)

## 2019-06-26 PROCEDURE — 36415 COLL VENOUS BLD VENIPUNCTURE: CPT | Performed by: NURSE PRACTITIONER

## 2019-06-26 PROCEDURE — 84443 ASSAY THYROID STIM HORMONE: CPT | Performed by: NURSE PRACTITIONER

## 2019-06-26 PROCEDURE — 99213 OFFICE O/P EST LOW 20 MIN: CPT | Performed by: NURSE PRACTITIONER

## 2019-06-26 ASSESSMENT — MIFFLIN-ST. JEOR: SCORE: 1171.44

## 2019-06-26 NOTE — PATIENT INSTRUCTIONS
We are checking your thyroid function today.  If it confirms that you have hypothyroidism, we will start replacement, or levothyroxine.    Patient Education     Hypothyroidism    You have hypothyroidism. This means your thyroid gland is not making enough thyroid hormone. This hormone is vital to body growth and metabolism. If you don t make enough, many body processes slow down. This can cause symptoms throughout the body. Hypothyroidism can range from mild to severe. The most severe form is called myxedema.  There are a number of causes of hypothyroidism. A common cause is Hashimoto s disease. This disease causes the body s own immune system to attack the thyroid gland. When you have certain treatments, such as surgery to remove the thyroid gland, this can also cause hypothyroidism. Sometimes the thyroid gland is not functioning because of lack of stimulation from the pituitary gland.  Symptoms of hypothyroidism can include:    Fatigue    Trouble concentrating or thinking clearly; forgetfulness    Dry skin    Hair loss    Weight gain    Low tolerance to cold    Constipation    Depression    Personality changes    Tingling or prickling of the hands or feet    Heavy, absent, or irregular periods (women only)  Older adults may sometimes have other symptoms. These can include:    Muscle aches and weakness    Confusion    Incontinence (unable to control urine or stool)    Trouble moving around    Falling  Treatment for hypothyroidism involves taking thyroid hormone pills daily. These pills replace the hormone your thyroid doesn t make. You will likely need to take a daily pill for the rest of your life. Tips for taking this medicine are given below.  Home care  Tips for taking your medicine    Take your thyroid hormone pills as prescribed by your healthcare provider. This is most often 1 pill a day on an empty stomach. Use a pillbox labeled with the days of the week. This will help you remember to take your pill each  day.    Don t take products that contain iron and calcium or antacids within 4 hours of taking your thyroid hormone pills.    Don t take other medicines with your thyroid hormone pill without checking with your provider first.    Tell your provider if you have any side effects from your medicines that bother you, especially any chest pain or irregular heart beats.    Never change the dosage or stop taking your thyroid pills without talking to your provider first.  General care    Always talk with your provider before trying other medicines or treatments for your thyroid problem.    If you see other healthcare providers, be sure to let them know about your thyroid problem.    Let your healthcare provider know if you become pregnant because your dose of thyroid hormone will need to be adjusted.  Follow-up care  See your healthcare provider for checkups as advised. You may need regular tests to check the level of thyroid hormone in your blood.  When to seek medical advice  Call your healthcare provider right away if any of these occur:    New symptoms develop    Symptoms return, continue, or worsen even after treatment    Extreme fatigue    Puffy hands, face, or feet    Fast or irregular heartbeat    Confusion  Call 911  Call 911 if any of these occur:    Fainting    Chest pain    Shortness of breath or trouble breathing  Date Last Reviewed: 4/1/2018 2000-2018 [employer]. [address] [place] [address], [place] [address]. All rights reserved. This information is not intended as a substitute for professional medical care. Always follow your healthcare professional's instructions.

## 2019-06-26 NOTE — PROGRESS NOTES
"Subjective     Liam Ellis is a 39 year old female who presents to clinic today for the following health issues:    HPI   Concern - Thyroid check   Onset: after pregnancy     Description:   Patient has been dealing with weight gain, constipation and fatigue. Patient would like to check her thyroid to make sure that everything is okay.    Had abnormal TSH few days ago during physical  Not pregnant or breastfeeding.    Patient Active Problem List   Diagnosis     CARDIOVASCULAR SCREENING; LDL GOAL LESS THAN 130     TMJ (temporomandibular joint syndrome)     Appendicitis     Closed displaced fracture of base of metacarpal bone     Need for Tdap vaccination     Cervical high risk HPV (human papillomavirus) test positive     Past Surgical History:   Procedure Laterality Date     APPENDECTOMY        SECTION N/A 2017    Procedure:  SECTION;;  Surgeon: Tracy Bowser MD;  Location:  L+D       Social History     Tobacco Use     Smoking status: Never Smoker     Smokeless tobacco: Never Used   Substance Use Topics     Alcohol use: No     Alcohol/week: 0.0 oz     Family History   Problem Relation Age of Onset     Hypertension Mother      Diabetes Mother      Hypothyroidism Father      Glaucoma No family hx of      Macular Degeneration No family hx of          No current outpatient medications on file.     No Known Allergies      Reviewed and updated as needed this visit by Provider  Tobacco  Allergies  Meds  Problems  Med Hx  Surg Hx  Fam Hx         Review of Systems   ROS COMP: Constitutional, HEENT, cardiovascular, pulmonary, gi and gu systems are negative, except as otherwise noted.      Objective    /68 (BP Location: Right arm, Patient Position: Chair, Cuff Size: Adult Regular)   Pulse 87   Temp 98.1  F (36.7  C) (Oral)   Resp 16   Ht 1.537 m (5' 0.5\")   Wt 56.7 kg (125 lb)   LMP 2019 (Approximate)   SpO2 99%   BMI 24.01 kg/m    Body mass index is 24.01 kg/m .  Physical " Exam   GENERAL: healthy, alert and no distress  NECK: no adenopathy, no asymmetry, masses, or scars and thyroid normal to palpation  RESP: lungs clear to auscultation - no rales, rhonchi or wheezes  CV: regular rate and rhythm, normal S1 S2, no S3 or S4, no murmur, click or rub, no peripheral edema and peripheral pulses strong  ABDOMEN: soft, nontender, no hepatosplenomegaly, no masses and bowel sounds normal  MS: no gross musculoskeletal defects noted, no edema  PSYCH: mentation appears normal, affect normal/bright    Diagnostic Test Results:  Results for orders placed or performed in visit on 06/26/19   TSH with free T4 reflex   Result Value Ref Range    TSH 3.15 0.40 - 4.00 mU/L           Assessment & Plan     1. Abnormal serum thyroid stimulating hormone (TSH) level  TSH normalized. Will continue to monitor. No replacement for now.  - TSH with free T4 reflex       See Patient Instructions    Return in about 2 months (around 8/26/2019).     Return precautions discussed, including when to seek urgent/emergent care.    Patient verbalizes understanding and agrees with plan of care. Patient stable for discharge.      ELLEN Noguera [facility]

## 2019-09-17 ENCOUNTER — TELEPHONE (OUTPATIENT)
Dept: OBGYN | Facility: CLINIC | Age: 39
End: 2019-09-17

## 2019-09-17 NOTE — TELEPHONE ENCOUNTER
Called and scheduled an appointment. Informed PT no intercourse 10 days prior, nothing inserted vaginally 24 hrs prior and to take 600 MG 1 hr prior to procedure.  Gillian Flores, [facility]

## 2019-09-17 NOTE — TELEPHONE ENCOUNTER
.Reason for Call:  colposcopy    Detailed comments: per RM Meza Patient needs a colposcopy, please call to schedule  Thank you  Phone Number Patient can be reached at: Home number on file [phone] (home)    Best Time: anytime      Can we leave a detailed message on this number? YES    Call taken on 9/17/2019 at 8:06 AM by Gianna Mahan

## 2019-10-04 ENCOUNTER — OFFICE VISIT (OUTPATIENT)
Dept: OBGYN | Facility: CLINIC | Age: 39
End: 2019-10-04
Payer: COMMERCIAL

## 2019-10-04 VITALS
HEART RATE: 84 BPM | OXYGEN SATURATION: 100 % | SYSTOLIC BLOOD PRESSURE: 94 MMHG | TEMPERATURE: 97.9 F | DIASTOLIC BLOOD PRESSURE: 66 MMHG | WEIGHT: 123 LBS | BODY MASS INDEX: 23.63 KG/M2

## 2019-10-04 DIAGNOSIS — R87.810 CERVICAL HIGH RISK HPV (HUMAN PAPILLOMAVIRUS) TEST POSITIVE: Primary | ICD-10-CM

## 2019-10-04 DIAGNOSIS — Z32.02 NEGATIVE PREGNANCY TEST: ICD-10-CM

## 2019-10-04 LAB — HCG UR QL: NEGATIVE

## 2019-10-04 PROCEDURE — 88305 TISSUE EXAM BY PATHOLOGIST: CPT | Performed by: OBSTETRICS & GYNECOLOGY

## 2019-10-04 PROCEDURE — 99213 OFFICE O/P EST LOW 20 MIN: CPT | Mod: 25 | Performed by: OBSTETRICS & GYNECOLOGY

## 2019-10-04 PROCEDURE — 81025 URINE PREGNANCY TEST: CPT | Performed by: OBSTETRICS & GYNECOLOGY

## 2019-10-04 PROCEDURE — 57455 BIOPSY OF CERVIX W/SCOPE: CPT | Performed by: OBSTETRICS & GYNECOLOGY

## 2019-10-04 PROCEDURE — 88175 CYTOPATH C/V AUTO FLUID REDO: CPT | Performed by: OBSTETRICS & GYNECOLOGY

## 2019-10-04 NOTE — PATIENT INSTRUCTIONS
If you have any questions regarding your visit, Please contact your care team.     [facility]: [phone]  [facility] CLINIC HOURS TELEPHONE NUMBER       Ramakrishna Abel M.D.      Jeffy Roca-Medical Assistant     Monday-[place]  8:00a.m-4:45 p.m  Tuesday-[place]  9:00a.m-4:00 p.m  Wednesday-[place] 8:00a.m-4:45 p.m.  Thursday-[place]  8:00a.m-4:45 p.m.  Friday-[place]  8:00a.m-4:45 p.m. [facility]  [address] [place] [address]  [phone] ask [facility]  [phone] Fax  Imaging [phone]    [facility]  [address] [place] [address] [place] [address]  [phone]    [facility]  [address] [place] [address]  [phone] [facility]  [phone] Fax  Imaging [phone]     Urgent Care locations:    [place]        [place] Monday-Friday  5 pm - 9 pm  Saturday and Sunday   9 am - 5 pm  Monday-Friday   11 am - 9 pm  Saturday and Sunday   9 am - 5 pm   [phone]   If you need a medication refill, please contact your pharmacy. Please allow 3 business days for your refill to be completed.  As always, Thank you for trusting us with your healthcare needs!

## 2019-10-04 NOTE — PROGRESS NOTES
OB-GYN Problem-Oriented Visit or Consultation      Liam Ellis is a 39 year old year old P 1 who presents with a chief complaint of persistent HRHPV.  Referred by self.  Patient's last menstrual period was 2019 (exact date).    HPI:   No cervix treatment in the past.   18 NIL, +HR HPV, not 16/18. Plan 1 yr co-test  19 NIL, +HR HPV, not 16/18. Plan Colposcopy    No pelvic symptoms.   Non-smoker.  Menses q 28-30 days x 4-5 days.  Contraceptive method is none. Attempting conception. Possible right tubal injury at last  section and may do HSG if not conceiving.     Past medical, obstetrical, surgical, family and social history reviewed and as noted or updated in chart.     Allergies, meds and supplements are as noted or updated in chart.      ROS:   Systems reviewed include:  constitutional, gastrointestinal, genitourinary, psychological, hematologic/lymphatic and endocrine.    These systems were negative for significant symptoms except for the following additional: none; see HPI.    EXAM:  VS as noted. BP 94/66 (BP Location: Left arm, Patient Position: Chair, Cuff Size: Adult Regular)   Pulse 84   Temp 97.9  F (36.6  C) (Oral)   Wt 55.8 kg (123 lb)   LMP 2019 (Exact Date)   SpO2 100%   Breastfeeding? No   BMI 23.63 kg/m    Constitutionally normal.     PROCEDURE: Discussed procedure, indications, risks, benefits and alternatives of colposcopy. Patient understood and desired to proceed. Preliminary endocervical cytology obtained. The cervix was inspected using acetic acid and Lugol's solution. The exam was satisfactory. Endocervical speculum not used. Findings: Cervix: See attached image.   Directed biopsies and curettings were taken as indicated: biopsies taken (not including ECC): 1 and hemostasis achieved with Monsel's solution.  ECC not done.    EMB not done.  Anesthesia: none.  Procedure Summary: Patient tolerated procedure well. Complications: none.  Colposcopic Impression:  HPV effect and SHIRLEY 1.   Plan: Anticipate observation and follow-up. Instructions given to patient.       Assessment:   Encounter Diagnoses   Name Primary?     Cervical high risk HPV (human papillomavirus) test positive Yes     Negative pregnancy test        I reviewed the condition, causes, differential diagnosis, prognosis, evaluation and management considerations and options.  Questions answered and information given. See orders.         Plan and Recommendations: Await Path. Other preconception advice reviewed.     Total encounter time (physician together with patient) = 30min. Direct counseling, education and care coordination time (physician together with patient) = 20min. with this additional separate time spent counseling on the evaluation and management of the patient's condition(s) beyond discussion of  the procedure itself including its risks, benefits and alternatives and beyond preliminary examination and performance of the procedure itself.      A/P:  Liam was seen today for colposcopy.    Diagnoses and all orders for this visit:    Cervical high risk HPV (human papillomavirus) test positive  -     COLP CERVIX/UPPER VAGINA W BX CERVIX  -     Surgical pathology exam    Negative pregnancy test  -     HCG Qual, Urine ([id number])        Ramakrishna Abel MD

## 2019-10-08 LAB — COPATH REPORT: NORMAL

## 2019-10-09 LAB
COPATH REPORT: NORMAL
PAP: NORMAL

## 2020-01-16 ENCOUNTER — OFFICE VISIT (OUTPATIENT)
Dept: OBGYN | Facility: CLINIC | Age: 40
End: 2020-01-16
Payer: COMMERCIAL

## 2020-01-16 VITALS
SYSTOLIC BLOOD PRESSURE: 103 MMHG | OXYGEN SATURATION: 97 % | WEIGHT: 127 LBS | HEIGHT: 61 IN | HEART RATE: 103 BPM | DIASTOLIC BLOOD PRESSURE: 67 MMHG | BODY MASS INDEX: 23.98 KG/M2

## 2020-01-16 DIAGNOSIS — N97.9 FEMALE INFERTILITY: Primary | ICD-10-CM

## 2020-01-16 PROCEDURE — 99214 OFFICE O/P EST MOD 30 MIN: CPT | Performed by: OBSTETRICS & GYNECOLOGY

## 2020-01-16 ASSESSMENT — MIFFLIN-ST. JEOR: SCORE: 1180.51

## 2020-01-16 NOTE — PROGRESS NOTES
"CC: getting pregnant  HPI: Liam Ellis is a 39 year old female Patient's last menstrual period was 2019. Menses are regular q 26-28 days, lasting 5 days. Menarche at age 12.  Dysmenorrhea none.   Cyclic symptoms include  none.   Additional symptoms include none    The patient has been trying to conceive for about 6 months actively.  She had her IUD removed about a year ago and really started timing intercourse and \"paying attention\" for the past 6 months.  She and her boyfriend have sex about 3 times/month, attempting to focus on fertile window.  She looked up timing online.     Prior pregnancy history is as follows: one FT c/s, pregnant within 3 months    Allergies: Patient has no known allergies.                    Past Medical History:   Diagnosis Date     Cervical high risk HPV (human papillomavirus) test positive ,     See problem list     Closed displaced fracture of base of metacarpal bone 2011     TMJ (temporomandibular joint syndrome) 12/10/2013       Past Surgical History:   Procedure Laterality Date     APPENDECTOMY        SECTION N/A 2017    Procedure:  SECTION;;  Surgeon: Tracy Bowser MD;  Location: [facility]     HC COLP CERVIX/UPPER VAGINA W BX CERVIX       HC INSERTION INTRAUTERINE DEVICE      Mirena     HC REMOVE INTRAUTERINE DEVICE             Social History     Tobacco Use     Smoking status: Never Smoker     Smokeless tobacco: Never Used   Substance Use Topics     Alcohol use: No     Alcohol/week: 0.0 standard drinks              Family History   Problem Relation Age of Onset     Hypertension Mother      Diabetes Mother      Hypothyroidism Father      Glaucoma No family hx of      Macular Degeneration No family hx of        No current outpatient medications on file.       Past GYN history:  No STD history and HPV  History of abnormal PAP: Yes,  Nil, +HPV, normal colp  History of surgery to cervix: No, but LTCS with extension into RT broad ligament " "necessitating typing off right fallopian tube due to hematoma  PID History: No  History of  IUD use: Yes, mirena  Galactorrhea: No  Hirsuitism: No  Intolerance to hot or cold: No  Significant change in weight within last year: No  Use of lubercants with intercourse: No    Of note, she was found to have hypothyroid earlier this year, retesting was normal and no treatment started.    PAST INFERTILITY EVALUATION AND TREATMENT:  Patient's work up has included: none    Hormonal evaluation has included:  None    Semen Analysis on partner: none    Past infertility treatment included none.    Partner is 34 years old. No history of trauma to the genitalia, medications, tobacco, drug use. No prior SA. father to her one child.    EXAM:  Blood pressure 103/67, pulse 103, height 1.537 m (5' 0.5\"), weight 57.6 kg (127 lb), last menstrual period 12/26/2019, SpO2 97 %, not currently breastfeeding.  BMI= Body mass index is 24.39 kg/m .  Patient's last menstrual period was 12/26/2019.  General - pleasant female in no acute distress.  Neurological - alert and oriented X 3  Psychiatric - normal mood and affect    No other physical examination was performed today as we spent over 50% of today's 25 minute visit in face-to-face discussion and counseling about infertility evalution.    ASSESSMENT/ PLAN:  1) Infertility   We discussed common causes of infertility including female factors, male factors, combination and unexplained infertility.  We reviewed the standard workup including day 3 labs, HSG and SA. I will call her with results and to discuss further management. Handouts given.  Questions answered.    MELIDA ANDINO MD      "

## 2020-01-16 NOTE — PATIENT INSTRUCTIONS
Call Sondra [phone] on the first day of your period (or Monday after it starts).  She will schedule you for a lab only visit to test your hormones.  She will also schedule you for an HSG, which is the xray to look at the uterus and fallopian tubes.    Your  will also need to have a semen analysis performed.  I will call you when I have the results of all your testing.    You can also start using an ovulation predictor kit.  The clear blue easy digital is the best test, but do NOT get the advanced kit, just get the regular with only one indicator.  Start testing on day 10 of your cycle.  When you get a smiley face, make sure you have sex that day and the next 2 days.  You should get a period 2 weeks after getting the smiley face, if you don't take a pregnancy test!

## 2020-01-27 DIAGNOSIS — Z01.812 PRE-PROCEDURE LAB EXAM: Primary | ICD-10-CM

## 2020-01-27 DIAGNOSIS — N97.9 FEMALE INFERTILITY: ICD-10-CM

## 2020-01-27 PROCEDURE — 84146 ASSAY OF PROLACTIN: CPT | Performed by: OBSTETRICS & GYNECOLOGY

## 2020-01-27 PROCEDURE — 36415 COLL VENOUS BLD VENIPUNCTURE: CPT | Performed by: OBSTETRICS & GYNECOLOGY

## 2020-01-27 PROCEDURE — 84443 ASSAY THYROID STIM HORMONE: CPT | Performed by: OBSTETRICS & GYNECOLOGY

## 2020-01-27 PROCEDURE — 82670 ASSAY OF TOTAL ESTRADIOL: CPT | Performed by: OBSTETRICS & GYNECOLOGY

## 2020-01-27 PROCEDURE — 83001 ASSAY OF GONADOTROPIN (FSH): CPT | Performed by: OBSTETRICS & GYNECOLOGY

## 2020-01-27 PROCEDURE — 99000 SPECIMEN HANDLING OFFICE-LAB: CPT | Performed by: OBSTETRICS & GYNECOLOGY

## 2020-01-27 PROCEDURE — 83520 IMMUNOASSAY QUANT NOS NONAB: CPT | Mod: 90 | Performed by: OBSTETRICS & GYNECOLOGY

## 2020-01-28 LAB
ESTRADIOL SERPL-MCNC: 36 PG/ML
FSH SERPL-ACNC: 24.2 IU/L
PROLACTIN SERPL-MCNC: 4 UG/L (ref 3–27)
TSH SERPL DL<=0.005 MIU/L-ACNC: 3.8 MU/L (ref 0.4–4)

## 2020-01-30 LAB — MIS SERPL-MCNC: 0.1 NG/ML (ref 0.18–11.71)

## 2020-02-03 ENCOUNTER — HOSPITAL ENCOUNTER (OUTPATIENT)
Dept: GENERAL RADIOLOGY | Facility: CLINIC | Age: 40
Discharge: HOME | End: 2020-02-03
Attending: OBSTETRICS & GYNECOLOGY | Admitting: OBSTETRICS & GYNECOLOGY
Payer: COMMERCIAL

## 2020-02-03 DIAGNOSIS — N97.9 FEMALE INFERTILITY: ICD-10-CM

## 2020-02-03 DIAGNOSIS — Z01.812 PRE-PROCEDURE LAB EXAM: ICD-10-CM

## 2020-02-03 LAB — HCG UR QL: NEGATIVE

## 2020-02-03 PROCEDURE — 81025 URINE PREGNANCY TEST: CPT | Performed by: OBSTETRICS & GYNECOLOGY

## 2020-02-03 PROCEDURE — 74740 X-RAY FEMALE GENITAL TRACT: CPT

## 2020-02-03 PROCEDURE — 25500064 ZZH RX 255 OP 636: Performed by: OBSTETRICS & GYNECOLOGY

## 2020-02-03 PROCEDURE — 58340 CATHETER FOR HYSTEROGRAPHY: CPT | Performed by: OBSTETRICS & GYNECOLOGY

## 2020-02-03 RX ORDER — IOPAMIDOL 510 MG/ML
100 INJECTION, SOLUTION INTRAVASCULAR ONCE
Status: COMPLETED | OUTPATIENT
Start: 2020-02-03 | End: 2020-02-03

## 2020-02-03 RX ADMIN — IOPAMIDOL 60 ML: 510 INJECTION, SOLUTION INTRAVASCULAR at 13:34

## 2020-02-03 NOTE — PROGRESS NOTES
The patient is seen today for a hysterosalpingogram to discern tubal patency.     Procedure:  After verbal informed consent was obtained, they patient was placed in dorsal lithotomy on the fluoroscopy table. A speculum was placed in the vagina and the vagina and cervix were prepped with chlorhexidine. A tenaculum was placed on the anterior lip of the cervix.    A [employer] acorn cannula was placed into the cervical os and the speculum was removed.    Under fluoroscopy, the Isovue dye was injected.    The radiologic findings will be documented by the interpreting radiologist.    The cannula and tenaculum were removed.    The patient tolerated the procedure well and was discharged to home.

## 2020-09-21 ENCOUNTER — PATIENT OUTREACH (OUTPATIENT)
Dept: FAMILY MEDICINE | Facility: CLINIC | Age: 40
End: 2020-09-21

## 2020-10-19 ENCOUNTER — PATIENT OUTREACH (OUTPATIENT)
Dept: FAMILY MEDICINE | Facility: CLINIC | Age: 40
End: 2020-10-19

## 2020-10-19 DIAGNOSIS — R87.810 CERVICAL HIGH RISK HPV (HUMAN PAPILLOMAVIRUS) TEST POSITIVE: ICD-10-CM

## 2020-11-20 ENCOUNTER — TRANSFERRED RECORDS (OUTPATIENT)
Dept: HEALTH INFORMATION MANAGEMENT | Facility: CLINIC | Age: 40
End: 2020-11-20

## 2020-12-13 ENCOUNTER — HEALTH MAINTENANCE LETTER (OUTPATIENT)
Age: 40
End: 2020-12-13

## 2021-05-11 ENCOUNTER — OFFICE VISIT (OUTPATIENT)
Dept: FAMILY MEDICINE | Facility: CLINIC | Age: 41
End: 2021-05-11
Payer: COMMERCIAL

## 2021-05-11 VITALS — DIASTOLIC BLOOD PRESSURE: 68 MMHG | HEART RATE: 93 BPM | SYSTOLIC BLOOD PRESSURE: 100 MMHG

## 2021-05-11 DIAGNOSIS — Z13.1 SCREENING FOR DIABETES MELLITUS: ICD-10-CM

## 2021-05-11 DIAGNOSIS — Z12.4 SCREENING FOR MALIGNANT NEOPLASM OF CERVIX: ICD-10-CM

## 2021-05-11 DIAGNOSIS — Z11.59 NEED FOR HEPATITIS C SCREENING TEST: ICD-10-CM

## 2021-05-11 DIAGNOSIS — Z13.6 CARDIOVASCULAR SCREENING; LDL GOAL LESS THAN 160: ICD-10-CM

## 2021-05-11 DIAGNOSIS — Z00.00 ROUTINE GENERAL MEDICAL EXAMINATION AT A HEALTH CARE FACILITY: Primary | ICD-10-CM

## 2021-05-11 LAB
CHOLEST SERPL-MCNC: 137 MG/DL
GLUCOSE SERPL-MCNC: 86 MG/DL (ref 70–99)
HDLC SERPL-MCNC: 62 MG/DL
LDLC SERPL CALC-MCNC: 63 MG/DL
NONHDLC SERPL-MCNC: 75 MG/DL
TRIGL SERPL-MCNC: 60 MG/DL

## 2021-05-11 PROCEDURE — 36415 COLL VENOUS BLD VENIPUNCTURE: CPT | Performed by: NURSE PRACTITIONER

## 2021-05-11 PROCEDURE — 82947 ASSAY GLUCOSE BLOOD QUANT: CPT | Performed by: NURSE PRACTITIONER

## 2021-05-11 PROCEDURE — 80061 LIPID PANEL: CPT | Performed by: NURSE PRACTITIONER

## 2021-05-11 PROCEDURE — 99396 PREV VISIT EST AGE 40-64: CPT | Performed by: NURSE PRACTITIONER

## 2021-05-11 PROCEDURE — 86803 HEPATITIS C AB TEST: CPT | Performed by: NURSE PRACTITIONER

## 2021-05-11 RX ORDER — CHORIOGONADOTROPIN ALFA 250 UG/.5ML
INJECTION, SOLUTION SUBCUTANEOUS
COMMUNITY
Start: 2021-04-30 | End: 2022-04-05

## 2021-05-11 RX ORDER — FOLLITROPIN 300 [IU]/.36ML
INJECTION, SOLUTION SUBCUTANEOUS
COMMUNITY
Start: 2021-04-30 | End: 2022-04-05

## 2021-05-11 RX ORDER — MEDROXYPROGESTERONE ACETATE 10 MG
10 TABLET ORAL DAILY
COMMUNITY
Start: 2021-02-13 | End: 2022-04-05

## 2021-05-11 RX ORDER — CLOMIPHENE CITRATE 50 MG/1
TABLET ORAL
COMMUNITY
Start: 2021-05-01 | End: 2022-04-05

## 2021-05-11 RX ORDER — LETROZOLE 2.5 MG/1
TABLET, FILM COATED ORAL
COMMUNITY
Start: 2021-02-04 | End: 2022-04-05

## 2021-05-11 NOTE — PROGRESS NOTES
"   SUBJECTIVE:   CC: Liam Ellis is an 41 year old woman who presents for preventive health visit.     {Split Bill scripting  The purpose of this visit is to discuss your medical history and prevent health problems before you are sick. You may be responsible for a co-pay, coinsurance, or deductible if your visit today includes services such as checking on a sore throat, having an x-ray or lab test, or treating and evaluating a new or existing condition :414967}  Patient has been advised of split billing requirements and indicates understanding: {Yes and No:655346}  Healthy Habits:    Do you get at least three servings of calcium containing foods daily (dairy, green leafy vegetables, etc.)? { :905033::\"yes\"}    Amount of exercise or daily activities, outside of work: { :717657}    Problems taking medications regularly { :587516::\"No\"}    Medication side effects: { :753336::\"No\"}    Have you had an eye exam in the past two years? { :299855}    Do you see a dentist twice per year? { :693226}    Do you have sleep apnea, excessive snoring or daytime drowsiness?{ :124983}  {Outside tests to abstract? :868288}    {additional problems to add (Optional):575272}    Today's PHQ-2 Score:   PHQ-2 ( 1999 [employer]) 6/21/2019 2/24/2017   Q1: Little interest or pleasure in doing things 0 3   Q2: Feeling down, depressed or hopeless 0 0   PHQ-2 Score 0 3     {PHQ-2 LOOK IN ASSESSMENTS (Optional) :634409}  Abuse: Current or Past(Physical, Sexual or Emotional)- {YES/NO/NA:288372}  Do you feel safe in your environment? {YES/NO/NA:511280}    Have you ever done Advance Care Planning? (For example, a Health Directive, POLST, or a discussion with a medical provider or your loved ones about your wishes): { :672925}    Social History     Tobacco Use     Smoking status: Never Smoker     Smokeless tobacco: Never Used   Substance Use Topics     Alcohol use: No     Alcohol/week: 0.0 standard drinks     If you drink alcohol do you typically have " ">3 drinks per day or >7 drinks per week? {ETOH :025881}                     Reviewed orders with patient.  Reviewed health maintenance and updated orders accordingly - {Yes/No:685099::\"Yes\"}  {Chronicprobdata (Optional):516879}    FSH-7: No flowsheet data found.  {If any of the questions to the BCRA (FHS-7) are answered yes, consider ordering referral for genetic counseling (Optional) :820443::\"click delete button to remove this line now\"}  {AMB Mammogram Decision Support (Optional) :568323}  Pertinent mammograms are reviewed under the imaging tab.    Pertinent mammograms are reviewed under the imaging tab.  History of abnormal Pap smear: {PAP HX:279702}  PAP / HPV Latest Ref Rng & Units 10/4/2019 6/21/2019 6/8/2018   PAP - NIL NIL NIL   HPV 16 DNA NEG:Negative - Negative Negative   HPV 18 DNA NEG:Negative - Negative Negative   OTHER HR HPV NEG:Negative - Positive(A) Positive(A)     Reviewed and updated as needed this visit by clinical staff                 Reviewed and updated as needed this visit by Provider                {HISTORY OPTIONS (Optional):124878}    ROS:  { :845244}    OBJECTIVE:   There were no vitals taken for this visit.  EXAM:  {Exam Choices:981387}    {Diagnostic Test Results (Optional):197430::\"Diagnostic Test Results:\",\"Labs reviewed in Epic\"}    ASSESSMENT/PLAN:   {Diag Picklist:985424}    Patient has been advised of split billing requirements and indicates understanding: {YES / NO:028770::\"Yes\"}  COUNSELING:   {FEMALE COUNSELING MESSAGES:341354::\"Reviewed preventive health counseling, as reflected in patient instructions\"}    Estimated body mass index is 24.39 kg/m  as calculated from the following:    Height as of 1/16/20: 1.537 m (5' 0.5\").    Weight as of 1/16/20: 57.6 kg (127 lb).    {Weight Management Plan (ACO) Complete if BMI is abnormal-  Ages 18-64  BMI >24.9.  Age 65+ with BMI <23 or >30 (Optional):410500}    She reports that she has never smoked. She has never used smokeless " tobacco.      Counseling Resources:  ATP IV Guidelines  Pooled Cohorts Equation Calculator  Breast Cancer Risk Calculator  BRCA-Related Cancer Risk Assessment: FHS-7 Tool  FRAX Risk Assessment  ICSI Preventive Guidelines  Dietary Guidelines for Americans, 2010  USDA's MyPlate  ASA Prophylaxis  Lung CA Screening    Pauly Meza, APRN CNP  [facility]

## 2021-05-11 NOTE — PATIENT INSTRUCTIONS
Recommend against scuba diving (no scuba diving)  Check with your fertility doctor about when it's safe to get pap done and schedule     Patient Education    At [facility], we strive to deliver an exceptional experience to you, every time we see you. If you receive a survey, please complete it as we do value your feedback.  If you have MyChart, you can expect to receive results automatically within 24 hours of their completion.  Your provider will send a note interpreting your results as well.   If you do not have MyChart, you should receive your results in about a week by mail.    Your care team:                            Family Medicine Internal Medicine   MD Philipp Arce, MD Susana Diaz, MD Dennis Santana, MD Kirsty Campbell, PAJarredC  Pauly Meza, APRN RACHEL Hernandez, MD Pediatrics   Joce Vega, PAJarredC  Patricia Whipple, CNP Kisha Lucas, MD Seble Jeff APRN CNP   MD Alycia Carpenter MD Deborah Mielke, MD Melissa Harvey, APRN [facility]      Clinic hours: Monday - Thursday 7 am-6 pm; Fridays 7 am-5 pm.   Urgent care: Monday - Friday 10 am- 8 pm; Saturday and Sunday 9 am-5 pm.    Clinic: [phone]       [facility]: Monday - Thursday 8 am - 7 pm; Friday 8 am - 6 pm  [facility]: [phone]     Use www.[email or web address].org for 24/7 diagnosis and treatment of dozens of conditions.    Preventive Health Recommendations  Female Ages 40 to 49    Yearly exam:     See your health care provider every year in order to  1. Review health changes.   2. Discuss preventive care.    3. Review your medicines if your doctor prescribed any.      Get a Pap test every three years (unless you have an abnormal result and your provider advises testing more often).      If you get Pap tests with HPV test, you only need to test every 5 years, unless you have an abnormal result. You do not need a Pap test  if your uterus was removed (hysterectomy) and you have not had cancer.      You should be tested each year for STDs (sexually transmitted diseases), if you're at risk.     Ask your doctor if you should have a mammogram.      Have a colonoscopy (test for colon cancer) if someone in your family has had colon cancer or polyps before age 50.       Have a cholesterol test every 5 years.       Have a diabetes test (fasting glucose) after age 45. If you are at risk for diabetes, you should have this test every 3 years.    Shots: Get a flu shot each year. Get a tetanus shot every 10 years.     Nutrition:     Eat at least 5 servings of fruits and vegetables each day.    Eat whole-grain bread, whole-wheat pasta and brown rice instead of white grains and rice.    Get adequate Calcium and Vitamin D.      Lifestyle    Exercise at least 150 minutes a week (an average of 30 minutes a day, 5 days a week). This will help you control your weight and prevent disease.    Limit alcohol to one drink per day.    No smoking.     Wear sunscreen to prevent skin cancer.    See your dentist every six months for an exam and cleaning.

## 2021-05-11 NOTE — PROGRESS NOTES
SUBJECTIVE:   CC: Liam Ellis is an 41 year old woman who presents for preventive health visit.       Patient has been advised of split billing requirements and indicates understanding: Yes  Healthy Habits:    Do you get at least three servings of calcium containing foods daily (dairy, green leafy vegetables, etc.)? No     Amount of exercise or daily activities, outside of work: 3-4 day(s) per week    Problems taking medications regularly No    Medication side effects: No    Have you had an eye exam in the past two years? yes    Do you see a dentist twice per year? no    Do you have sleep apnea, excessive snoring or daytime drowsiness?no    LMP 4/29  Undergoing IUI treatment  Has paperwork for scuba diving to be completed.    Today's PHQ-2 Score:   PHQ-2 ( 1999 [employer]) 5/11/2021 6/21/2019   Q1: Little interest or pleasure in doing things 0 0   Q2: Feeling down, depressed or hopeless 0 0   PHQ-2 Score 0 0       Abuse: Current or Past(Physical, Sexual or Emotional)- No  Do you feel safe in your environment? Yes    Have you ever done Advance Care Planning? (For example, a Health Directive, POLST, or a discussion with a medical provider or your loved ones about your wishes): No, advance care planning information given to patient to review.  Patient declined advance care planning discussion at this time.    Social History     Tobacco Use     Smoking status: Never Smoker     Smokeless tobacco: Never Used   Substance Use Topics     Alcohol use: No     Alcohol/week: 0.0 standard drinks     If you drink alcohol do you typically have >3 drinks per day or >7 drinks per week? No                     Reviewed orders with patient.  Reviewed health maintenance and updated orders accordingly - Yes  Lab work is in process  Labs reviewed in EPIC  BP Readings from Last 3 Encounters:   05/11/21 100/68   01/16/20 103/67   10/04/19 94/66    Wt Readings from Last 3 Encounters:   01/16/20 57.6 kg (127 lb)   10/04/19 55.8 kg (123 lb)    19 56.7 kg (125 lb)                  Patient Active Problem List   Diagnosis     CARDIOVASCULAR SCREENING; LDL GOAL LESS THAN 130     TMJ (temporomandibular joint syndrome)     Cervical high risk HPV (human papillomavirus) test positive     Past Surgical History:   Procedure Laterality Date     APPENDECTOMY        SECTION N/A 2017    Procedure:  SECTION;;  Surgeon: Tracy Bowser MD;  Location: [facility]     HC COLP CERVIX/UPPER VAGINA W BX CERVIX  2019     HC INSERTION INTRAUTERINE DEVICE      Mirena     HC REMOVE INTRAUTERINE DEVICE         Social History     Tobacco Use     Smoking status: Never Smoker     Smokeless tobacco: Never Used   Substance Use Topics     Alcohol use: No     Alcohol/week: 0.0 standard drinks     Family History   Problem Relation Age of Onset     Hypertension Mother      Diabetes Mother      Hypothyroidism Father      Glaucoma No family hx of      Macular Degeneration No family hx of          Current Outpatient Medications   Medication Sig Dispense Refill     clomiPHENE (CLOMID) 50 MG tablet TAKE 1 TO 4 TABLETS BY MOUTH AS DIRECTED       FOLLISTIM  UNT/0.36ML cartridge        letrozole (FEMARA) 2.5 MG tablet TAKE 3 TABLETS BY MOUTH ONCE DAILY FOR 5 DAYS. BEGIN TONIGHT       medroxyPROGESTERone (PROVERA) 10 MG tablet Take 10 mg by mouth daily       OVIDREL 250 MCG/0.5ML syringe SC INJ        No Known Allergies  Recent Labs   Lab Test 20  1650 19  1204 19  1227 18  1214 09/25/15  0844   LDL  --   --  54 57 52   HDL  --   --  49* 46* 46*   TRIG  --   --  42 44 36   ALT  --   --  19  --   --    CR  --   --  0.58  --   --    GFRESTIMATED  --   --  >90  --   --    GFRESTBLACK  --   --  >90  --   --    POTASSIUM  --   --  3.8  --   --    TSH 3.80 3.15 9.72*  --  0.21*        Breast CA Risk Assessment (FHS-7) 2021   Do you have a family history of breast, colon, or ovarian cancer? No / Unknown         Mammogram Screening - Offered annual  screening and updated Health Maintenance for mutual plan based on risk factor consideration    Pertinent mammograms are reviewed under the imaging tab.    Pertinent mammograms are reviewed under the imaging tab.  History of abnormal Pap smear:   Last 3 Pap Results:   PAP (no units)   Date Value   10/04/2019 NIL   2019 NIL   2018 NIL     Last 3 Pap and HPV Results:   PAP / HPV Latest Ref Rng & Units 10/4/2019 2019 2018   PAP - NIL NIL NIL   HPV 16 DNA NEG:Negative - Negative Negative   HPV 18 DNA NEG:Negative - Negative Negative   OTHER HR HPV NEG:Negative - Positive(A) Positive(A)     PAP / HPV Latest Ref Rng & Units 10/4/2019 2019 2018   PAP - NIL NIL NIL   HPV 16 DNA NEG:Negative - Negative Negative   HPV 18 DNA NEG:Negative - Negative Negative   OTHER HR HPV NEG:Negative - Positive(A) Positive(A)     Reviewed and updated as needed this visit by clinical staff  Tobacco   Meds   Med Hx  Surg Hx  Fam Hx  Soc Hx        Reviewed and updated as needed this visit by Provider                Past Medical History:   Diagnosis Date     Cervical high risk HPV (human papillomavirus) test positive 2019    See problem list     Closed displaced fracture of base of metacarpal bone 2011     TMJ (temporomandibular joint syndrome) 12/10/2013      Past Surgical History:   Procedure Laterality Date     APPENDECTOMY        SECTION N/A 2017    Procedure:  SECTION;;  Surgeon: Tracy Bowser MD;  [facility]     HC COLP CERVIX/UPPER VAGINA W BX CERVIX       HC INSERTION INTRAUTERINE DEVICE      Mirena     HC REMOVE INTRAUTERINE DEVICE         ROS:  CONSTITUTIONAL: NEGATIVE for fever, chills, change in weight  INTEGUMENTARU/SKIN: NEGATIVE for worrisome rashes, moles or lesions  EYES: NEGATIVE for vision changes or irritation  ENT: NEGATIVE for ear, mouth and throat problems  RESP: NEGATIVE for significant cough or SOB  BREAST: NEGATIVE for masses, tenderness or  discharge  CV: NEGATIVE for chest pain, palpitations or peripheral edema  GI: NEGATIVE for nausea, abdominal pain, heartburn, or change in bowel habits  : NEGATIVE for unusual urinary or vaginal symptoms. Periods are regular.  MUSCULOSKELETAL: NEGATIVE for significant arthralgias or myalgia  NEURO: NEGATIVE for weakness, dizziness or paresthesias  PSYCHIATRIC: NEGATIVE for changes in mood or affect    OBJECTIVE:   /68 (BP Location: Left arm, Patient Position: Chair, Cuff Size: Adult Regular)   Pulse 93   EXAM:  GENERAL: healthy, alert and no distress  EYES: Eyes grossly normal to inspection, PERRL and conjunctivae and sclerae normal  HENT: ear canals and TM's normal, nose and mouth without ulcers or lesions  NECK: no adenopathy, no asymmetry, masses, or scars and thyroid normal to palpation  RESP: lungs clear to auscultation - no rales, rhonchi or wheezes  BREAST: normal without masses, tenderness or nipple discharge and no palpable axillary masses or adenopathy  CV: regular rate and rhythm, normal S1 S2, no S3 or S4, no murmur, click or rub, no peripheral edema and peripheral pulses strong  ABDOMEN: soft, nontender, no hepatosplenomegaly, no masses and bowel sounds normal  MS: no gross musculoskeletal defects noted, no edema  SKIN: no suspicious lesions or rashes  NEURO: Normal strength and tone, mentation intact and speech normal  PSYCH: mentation appears normal, affect normal/bright    Diagnostic Test Results:  Labs reviewed in Epic    ASSESSMENT/PLAN:   1. Routine general medical examination at a health care facility  Fasting today.    2. Screening for malignant neoplasm of cervix  Discussed however patient declines today as she is undergoing IUI for infertility. She will discuss with her specialist and schedule once she gets the ok.    3. Need for hepatitis C screening test  Discussed and agreeable today.   - Hepatitis C Screen Reflex to HCV RNA Quant and Genotype    4. Screening for diabetes  "mellitus  Fasting today.  - Glucose    5. CARDIOVASCULAR SCREENING; LDL GOAL LESS THAN 160  Fasting today.  - Lipid panel reflex to direct LDL Fasting    Patient has been advised of split billing requirements and indicates understanding: Yes  COUNSELING:   Reviewed preventive health counseling, as reflected in patient instructions       Regular exercise       Healthy diet/nutrition    Estimated body mass index is 24.39 kg/m  as calculated from the following:    Height as of 1/16/20: 1.537 m (5' 0.5\").    Weight as of 1/16/20: 57.6 kg (127 lb).        She reports that she has never smoked. She has never used smokeless tobacco.      Counseling Resources:  ATP IV Guidelines  Pooled Cohorts Equation Calculator  Breast Cancer Risk Calculator  BRCA-Related Cancer Risk Assessment: FHS-7 Tool  FRAX Risk Assessment  ICSI Preventive Guidelines  Dietary Guidelines for Americans, 2010  USDA's MyPlate  ASA Prophylaxis  Lung CA Screening    ELLEN Noguera CNP  [facility]  "

## 2021-05-12 LAB — HCV AB SERPL QL IA: NONREACTIVE

## 2021-09-26 ENCOUNTER — HEALTH MAINTENANCE LETTER (OUTPATIENT)
Age: 41
End: 2021-09-26

## 2021-10-06 NOTE — PLAN OF CARE
Ongoing SW/CM Assessment/Plan of Care Note     See SW/CM flowsheets for goals and other objective data.    Patient/Family discharge goal (s):  Goal #1: Psychosocial needs assessed  Goal #2: Education/provision of information (community resources)       PT Recommendation:          OT Recommendation:  Recommendations for Discharge: OT WI:  (Group home)       SLP Recommendation:       Disposition:       Progress note:     Per peer's note, will need to fax dialysis flowsheets, chest x-ray and Hepatitis labs when available to [facility].     Spoke to RN who will get CXR order. Hepatitis labs pending.  Will continue to follow for outpt dialysis arrangements.         Problem: Goal Outcome Summary  Goal: Goal Outcome Summary  Outcome: No Change  [id number]  Data: Vital signs within normal limits. Postpartum checks within normal limits - see flow record. Patient eating and drinking normally. Patient able to empty bladder independently and is up ambulating. No apparent signs of infection. incision C/D/I. Patient performing self cares and is able to care for infant.  Action: Patient was medicated during the shift for abdominal pain. See MAR. Patient reassessed within 1 hour after each medication and pain was improved - patient stated she was comfortable. Patient education done on self care and pain management.    Response: Positive attachment behaviors observed with infant. Support persons present.   Plan: Anticipate discharge on when medically stable.

## 2021-11-14 ENCOUNTER — OFFICE VISIT (OUTPATIENT)
Dept: URGENT CARE | Facility: URGENT CARE | Age: 41
End: 2021-11-14
Payer: COMMERCIAL

## 2021-11-14 VITALS
DIASTOLIC BLOOD PRESSURE: 55 MMHG | OXYGEN SATURATION: 96 % | HEART RATE: 81 BPM | RESPIRATION RATE: 14 BRPM | SYSTOLIC BLOOD PRESSURE: 93 MMHG | TEMPERATURE: 97.6 F

## 2021-11-14 DIAGNOSIS — J20.9 ACUTE BRONCHITIS WITH SYMPTOMS > 10 DAYS: Primary | ICD-10-CM

## 2021-11-14 PROCEDURE — 99214 OFFICE O/P EST MOD 30 MIN: CPT | Performed by: PHYSICIAN ASSISTANT

## 2021-11-14 PROCEDURE — U0003 INFECTIOUS AGENT DETECTION BY NUCLEIC ACID (DNA OR RNA); SEVERE ACUTE RESPIRATORY SYNDROME CORONAVIRUS 2 (SARS-COV-2) (CORONAVIRUS DISEASE [COVID-19]), AMPLIFIED PROBE TECHNIQUE, MAKING USE OF HIGH THROUGHPUT TECHNOLOGIES AS DESCRIBED BY CMS-2020-01-R: HCPCS | Mod: 90 | Performed by: PHYSICIAN ASSISTANT

## 2021-11-14 PROCEDURE — U0005 INFEC AGEN DETEC AMPLI PROBE: HCPCS | Mod: 90 | Performed by: PHYSICIAN ASSISTANT

## 2021-11-14 PROCEDURE — 99000 SPECIMEN HANDLING OFFICE-LAB: CPT | Performed by: PHYSICIAN ASSISTANT

## 2021-11-14 RX ORDER — BENZONATATE 200 MG/1
200 CAPSULE ORAL 3 TIMES DAILY PRN
Qty: 30 CAPSULE | Refills: 0 | Status: SHIPPED | OUTPATIENT
Start: 2021-11-14 | End: 2021-11-24

## 2021-11-14 RX ORDER — AZITHROMYCIN 250 MG/1
TABLET, FILM COATED ORAL
Qty: 6 TABLET | Refills: 0 | Status: SHIPPED | OUTPATIENT
Start: 2021-11-14 | End: 2022-04-05

## 2021-11-14 RX ORDER — ALBUTEROL SULFATE 90 UG/1
2 AEROSOL, METERED RESPIRATORY (INHALATION) EVERY 4 HOURS PRN
Qty: 18 G | Refills: 0 | Status: SHIPPED | OUTPATIENT
Start: 2021-11-14 | End: 2022-04-05

## 2021-11-14 ASSESSMENT — ENCOUNTER SYMPTOMS
SHORTNESS OF BREATH: 1
SINUS PRESSURE: 0
FATIGUE: 0
CARDIOVASCULAR NEGATIVE: 1
CONSTITUTIONAL NEGATIVE: 1
SORE THROAT: 1
RHINORRHEA: 0
COUGH: 1
PALPITATIONS: 0
GASTROINTESTINAL NEGATIVE: 1
WHEEZING: 0
FEVER: 0
SINUS PAIN: 0
CHILLS: 0

## 2021-11-14 NOTE — PROGRESS NOTES
Evie Wheeler is a 41 year old who presents for the following health issues   HPI   Acute Illness  Acute illness concerns:   Onset/Duration: 10days  Symptoms:  Fever: no  Chills/Sweats: no  Headache (location?): no  Sinus Pressure: no  Conjunctivitis:  no  Ear Pain: no  Rhinorrhea: no  Congestion: no  Sore Throat: YES  Cough: YES-productive of yellow sputum, shortness of breath  Wheeze: no  Decreased Appetite: no  Nausea: no  Vomiting: YES  Diarrhea: no  Dysuria/Freq.: no  Dysuria or Hematuria: no  Fatigue/Achiness: no  Sick/Strep Exposure: YES- ill contacts at home.  Loss of taste  Therapies tried and outcome: dayquil, nyquil with minimal relief    Patient Active Problem List   Diagnosis     CARDIOVASCULAR SCREENING; LDL GOAL LESS THAN 130     TMJ (temporomandibular joint syndrome)     Cervical high risk HPV (human papillomavirus) test positive     Current Outpatient Medications   Medication     clomiPHENE (CLOMID) 50 MG tablet     FOLLISTIM  UNT/0.36ML cartridge     letrozole (FEMARA) 2.5 MG tablet     medroxyPROGESTERone (PROVERA) 10 MG tablet     OVIDREL 250 MCG/0.5ML syringe SC INJ     No current facility-administered medications for this visit.      No Known Allergies    Review of Systems   Constitutional: Negative.  Negative for chills, fatigue and fever.   HENT: Positive for sore throat. Negative for congestion, ear discharge, ear pain, hearing loss, rhinorrhea, sinus pressure and sinus pain.    Respiratory: Positive for cough and shortness of breath. Negative for wheezing.    Cardiovascular: Negative.  Negative for chest pain, palpitations and peripheral edema.   Gastrointestinal: Negative.    All other systems reviewed and are negative.           Objective    BP 93/55 (BP Location: Left arm, Patient Position: Sitting, Cuff Size: Adult Regular)   Pulse 81   Temp 97.6  F (36.4  C) (Tympanic)   Resp 14   SpO2 96%   There is no height or weight on file to calculate BMI.  Physical Exam  Vitals  and nursing note reviewed.   Constitutional:       General: She is not in acute distress.     Appearance: Normal appearance. She is well-developed and normal weight. She is not ill-appearing.   HENT:      Head: Normocephalic and atraumatic.      Comments: TMs are intact without any erythema or bulging bilaterally.  Airway is patent.     Nose: Nose normal.      Mouth/Throat:      Mouth: Mucous membranes are moist.      Pharynx: Uvula midline. No pharyngeal swelling, oropharyngeal exudate or posterior oropharyngeal erythema.      Tonsils: No tonsillar exudate.   Eyes:      General: No scleral icterus.     Extraocular Movements: Extraocular movements intact.      Conjunctiva/sclera: Conjunctivae normal.      Pupils: Pupils are equal, round, and reactive to light.   Neck:      Thyroid: No thyromegaly.   Cardiovascular:      Rate and Rhythm: Normal rate and regular rhythm.      Pulses: Normal pulses.      Heart sounds: Normal heart sounds, S1 normal and S2 normal. No murmur heard.  No friction rub. No gallop.    Pulmonary:      Effort: Pulmonary effort is normal. No accessory muscle usage, respiratory distress or retractions.      Breath sounds: Normal breath sounds and air entry. No stridor. No decreased breath sounds, wheezing, rhonchi or rales.   Musculoskeletal:      Cervical back: Normal range of motion and neck supple.   Lymphadenopathy:      Cervical: No cervical adenopathy.   Skin:     General: Skin is warm and dry.      Nails: There is no clubbing.   Neurological:      Mental Status: She is alert and oriented to person, place, and time.   Psychiatric:         Mood and Affect: Mood normal.         Behavior: Behavior normal.         Thought Content: Thought content normal.         Judgment: Judgment normal.          Assessment/Plan:  Acute bronchitis with symptoms > 10 days:  Will treat with zithromax X5days, tessalon perles, and albuterol inh as needed for symptoms.  Will also check covid as well.  Recommend  treatment with rest, fluids and chicken soup. Tylenol/ibuprofen prn fever/pain.  Recheck in clinic if symptoms worsen or if symptoms do not improve.  To the ER if she develops hemoptysis, chest pain, fevers>102, worsening shortness of breath/wheezing.    -     Symptomatic COVID-19 Virus (Coronavirus) by PCR Nose  -     azithromycin (ZITHROMAX) 250 MG tablet; 2 tablets the first day, then 1 tablet daily for the next 4 days  -     benzonatate (TESSALON) 200 MG capsule; Take 1 capsule (200 mg) by mouth 3 times daily as needed for cough  -     albuterol (PROAIR HFA/PROVENTIL HFA/VENTOLIN HFA) 108 (90 Base) MCG/ACT inhaler; Inhale 2 puffs into the lungs every 4 hours as needed for shortness of breath / dyspnea or wheezing Use with spacer        Sylvia See BELINDA Mcclendon

## 2021-11-16 ENCOUNTER — TELEPHONE (OUTPATIENT)
Dept: URGENT CARE | Facility: URGENT CARE | Age: 41
End: 2021-11-16
Payer: COMMERCIAL

## 2021-11-16 LAB — SARS-COV-2 RNA RESP QL NAA+PROBE: NOT DETECTED

## 2021-11-16 NOTE — TELEPHONE ENCOUNTER
Patient called, she is asking for the Covid results.      Routing to [facility].    Shahida Zhang RN, [facility]

## 2022-03-11 ENCOUNTER — TRANSFERRED RECORDS (OUTPATIENT)
Dept: HEALTH INFORMATION MANAGEMENT | Facility: CLINIC | Age: 42
End: 2022-03-11
Payer: COMMERCIAL

## 2022-04-05 ENCOUNTER — PRENATAL OFFICE VISIT (OUTPATIENT)
Dept: NURSING | Facility: CLINIC | Age: 42
End: 2022-04-05
Payer: COMMERCIAL

## 2022-04-05 VITALS — HEIGHT: 61 IN | BODY MASS INDEX: 24.39 KG/M2

## 2022-04-05 DIAGNOSIS — Z23 NEED FOR TDAP VACCINATION: ICD-10-CM

## 2022-04-05 DIAGNOSIS — O09.529 ENCOUNTER FOR SUPERVISION OF MULTIGRAVIDA WITH ADVANCED MATERNAL AGE: Primary | ICD-10-CM

## 2022-04-05 PROCEDURE — 99207 PR NO CHARGE NURSE ONLY: CPT

## 2022-04-05 RX ORDER — PROGESTERONE 50 MG/ML
INJECTION, SOLUTION INTRAMUSCULAR
COMMUNITY
Start: 2022-03-22 | End: 2022-04-05

## 2022-04-05 RX ORDER — DIAZEPAM 10 MG
TABLET ORAL
COMMUNITY
Start: 2022-01-06 | End: 2022-04-05

## 2022-04-05 RX ORDER — LEVOTHYROXINE SODIUM 75 UG/1
75 TABLET ORAL DAILY
COMMUNITY
Start: 2021-10-08 | End: 2022-06-21

## 2022-04-05 RX ORDER — ASPIRIN 81 MG/1
81 TABLET, CHEWABLE ORAL DAILY
Status: ON HOLD | COMMUNITY
End: 2022-10-28

## 2022-04-05 RX ORDER — ESTRADIOL 2 MG/1
TABLET ORAL
COMMUNITY
Start: 2022-03-22 | End: 2022-04-05

## 2022-04-05 NOTE — PROGRESS NOTES
Important Information for Provider:     New ob nurse intake by phone, second pregnancy, AMA. History of C section 5/14/2017. Patient went to [facility] for IVF. Ultrasound was performed 3/11/2022. Handouts reviewed and mailed. Dicussed genetic screening. NOB with Dr Gomez 4/26/2022.   History of gestational diabetes, diet controlled. Ordered early 1 hour GCT, A1C with NOB labs( patient aware)    Caffeine intake/servings daily - 1  Calcium intake/servings daily - 3  Exercise 5 times weekly - describe ; walks, precautions given  Sunscreen used - Yes  Seatbelts used - Yes  Guns stored in the home - No  Self Breast Exam - Yes  Pap test up to date -  Yes  Eye exam up to date -  Yes  Dental exam up to date -  Yes  Immunizations reviewed and up to date - Yes  Abuse: Current or Past (Physical, Sexual or Emotional) - No  Do you feel safe in your environment - Yes  Do you cope well with stress - Yes  Do you suffer from insomnia - No        Prenatal OB Questionnaire  Patient supplied answers from flow sheet for:  Prenatal OB Questionnaire.  Past Medical History  Have you ever received care for your mental health? : No  Have you ever been in a major accident or suffered serious trauma?: No  Within the last year, has anyone hit, slapped, kicked or otherwise hurt you?: No  In the last year, has anyone forced you to have sex when you didn't want to?: No    Past Medical History 2   Have you ever received a blood transfusion?: No  Would you accept a blood transfusion if was medically recommended?: Yes  Does anyone in your home smoke?: No   Is your blood type Rh negative?: No  Have you ever ?: No  Have you been hospitalized for a nonsurgical reason excluding normal delivery?: No  Have you ever had an abnormal pap smear?: No    Past Medical History (Continued)  Do you have a history of abnormalities of the uterus?: No  Did your mother take MARLEN or any other hormones when she was pregnant with you?: No  Do you have any other  problems we have not asked about which you feel may be important to this pregnancy?: No                     Allergies as of 4/5/2022:    Allergies as of 04/05/2022     (No Known Allergies)       Current medications are:  Current Outpatient Medications   Medication Sig Dispense Refill     aspirin (ASA) 81 MG chewable tablet Take 81 mg by mouth daily       Prenatal Vit-Fe Fumarate-FA (PRENATAL COMPLETE PO)        levothyroxine (SYNTHROID/LEVOTHROID) 75 MCG tablet Take 75 mcg by mouth daily (Patient not taking: Reported on 4/5/2022)           Early ultrasound screening tool:    Does patient have irregular periods?  No  Did patient use hormonal birth control in the three months prior to positive urine pregnancy test? No  Is the patient breastfeeding?  No  Is the patient 10 weeks or greater at time of education visit?  Yes

## 2022-04-26 ENCOUNTER — TRANSCRIBE ORDERS (OUTPATIENT)
Dept: MATERNAL FETAL MEDICINE | Facility: HOSPITAL | Age: 42
End: 2022-04-26

## 2022-04-26 ENCOUNTER — PRENATAL OFFICE VISIT (OUTPATIENT)
Dept: OBGYN | Facility: CLINIC | Age: 42
End: 2022-04-26
Payer: COMMERCIAL

## 2022-04-26 VITALS
OXYGEN SATURATION: 100 % | DIASTOLIC BLOOD PRESSURE: 67 MMHG | WEIGHT: 120.8 LBS | TEMPERATURE: 97.2 F | SYSTOLIC BLOOD PRESSURE: 95 MMHG | HEART RATE: 82 BPM | BODY MASS INDEX: 23.2 KG/M2

## 2022-04-26 DIAGNOSIS — Z12.4 SCREENING FOR CERVICAL CANCER: ICD-10-CM

## 2022-04-26 DIAGNOSIS — O26.90 PREGNANCY RELATED CONDITION, ANTEPARTUM: Primary | ICD-10-CM

## 2022-04-26 DIAGNOSIS — Z86.32 HISTORY OF GESTATIONAL DIABETES MELLITUS (GDM): ICD-10-CM

## 2022-04-26 DIAGNOSIS — Z78.9 CONCEIVED BY IN VITRO FERTILIZATION: ICD-10-CM

## 2022-04-26 DIAGNOSIS — R73.09 IMPAIRED GLUCOSE TOLERANCE TEST: ICD-10-CM

## 2022-04-26 DIAGNOSIS — Z20.828 EXPOSURE TO CYTOMEGALOVIRUS (CMV): ICD-10-CM

## 2022-04-26 DIAGNOSIS — O09.529 ENCOUNTER FOR SUPERVISION OF MULTIGRAVIDA WITH ADVANCED MATERNAL AGE: Primary | ICD-10-CM

## 2022-04-26 LAB
ABO/RH(D): NORMAL
ALBUMIN UR-MCNC: NEGATIVE MG/DL
ANTIBODY SCREEN: NEGATIVE
APPEARANCE UR: CLEAR
BILIRUB UR QL STRIP: NEGATIVE
CMV IGG SERPL IA-ACNC: >10 U/ML
CMV IGG SERPL IA-ACNC: ABNORMAL
CMV IGM SERPL IA-ACNC: <8 AU/ML
CMV IGM SERPL IA-ACNC: NEGATIVE
COLOR UR AUTO: YELLOW
ERYTHROCYTE [DISTWIDTH] IN BLOOD BY AUTOMATED COUNT: 14 % (ref 10–15)
GLUCOSE 1H P 50 G GLC PO SERPL-MCNC: 169 MG/DL (ref 70–129)
GLUCOSE UR STRIP-MCNC: NEGATIVE MG/DL
HBA1C MFR BLD: 5.2 % (ref 0–5.6)
HBV SURFACE AG SERPL QL IA: NONREACTIVE
HCT VFR BLD AUTO: 32.6 % (ref 35–47)
HCV AB SERPL QL IA: NONREACTIVE
HGB BLD-MCNC: 11.1 G/DL (ref 11.7–15.7)
HGB UR QL STRIP: NEGATIVE
HIV 1+2 AB+HIV1 P24 AG SERPL QL IA: NONREACTIVE
KETONES UR STRIP-MCNC: NEGATIVE MG/DL
LEUKOCYTE ESTERASE UR QL STRIP: NEGATIVE
MCH RBC QN AUTO: 31.2 PG (ref 26.5–33)
MCHC RBC AUTO-ENTMCNC: 34 G/DL (ref 31.5–36.5)
MCV RBC AUTO: 92 FL (ref 78–100)
NITRATE UR QL: NEGATIVE
PH UR STRIP: 7 [PH] (ref 5–7)
PLATELET # BLD AUTO: 294 10E3/UL (ref 150–450)
RBC # BLD AUTO: 3.56 10E6/UL (ref 3.8–5.2)
RUBV IGG SERPL QL IA: 1.12 INDEX
RUBV IGG SERPL QL IA: POSITIVE
SP GR UR STRIP: <=1.005 (ref 1–1.03)
SPECIMEN EXPIRATION DATE: NORMAL
T PALLIDUM AB SER QL: NONREACTIVE
UROBILINOGEN UR STRIP-ACNC: 0.2 E.U./DL
WBC # BLD AUTO: 6.3 10E3/UL (ref 4–11)

## 2022-04-26 PROCEDURE — 87389 HIV-1 AG W/HIV-1&-2 AB AG IA: CPT | Performed by: OBSTETRICS & GYNECOLOGY

## 2022-04-26 PROCEDURE — 86645 CMV ANTIBODY IGM: CPT | Performed by: OBSTETRICS & GYNECOLOGY

## 2022-04-26 PROCEDURE — 86901 BLOOD TYPING SEROLOGIC RH(D): CPT | Performed by: OBSTETRICS & GYNECOLOGY

## 2022-04-26 PROCEDURE — 81003 URINALYSIS AUTO W/O SCOPE: CPT | Performed by: OBSTETRICS & GYNECOLOGY

## 2022-04-26 PROCEDURE — 86900 BLOOD TYPING SEROLOGIC ABO: CPT | Performed by: OBSTETRICS & GYNECOLOGY

## 2022-04-26 PROCEDURE — 86803 HEPATITIS C AB TEST: CPT | Performed by: OBSTETRICS & GYNECOLOGY

## 2022-04-26 PROCEDURE — 86644 CMV ANTIBODY: CPT | Performed by: OBSTETRICS & GYNECOLOGY

## 2022-04-26 PROCEDURE — 87086 URINE CULTURE/COLONY COUNT: CPT | Performed by: OBSTETRICS & GYNECOLOGY

## 2022-04-26 PROCEDURE — 82950 GLUCOSE TEST: CPT | Performed by: OBSTETRICS & GYNECOLOGY

## 2022-04-26 PROCEDURE — 86850 RBC ANTIBODY SCREEN: CPT | Performed by: OBSTETRICS & GYNECOLOGY

## 2022-04-26 PROCEDURE — 83036 HEMOGLOBIN GLYCOSYLATED A1C: CPT | Performed by: OBSTETRICS & GYNECOLOGY

## 2022-04-26 PROCEDURE — 99207 PR FIRST OB VISIT: CPT | Performed by: OBSTETRICS & GYNECOLOGY

## 2022-04-26 PROCEDURE — 36415 COLL VENOUS BLD VENIPUNCTURE: CPT | Performed by: OBSTETRICS & GYNECOLOGY

## 2022-04-26 PROCEDURE — 87340 HEPATITIS B SURFACE AG IA: CPT | Performed by: OBSTETRICS & GYNECOLOGY

## 2022-04-26 PROCEDURE — 86762 RUBELLA ANTIBODY: CPT | Performed by: OBSTETRICS & GYNECOLOGY

## 2022-04-26 PROCEDURE — 85027 COMPLETE CBC AUTOMATED: CPT | Performed by: OBSTETRICS & GYNECOLOGY

## 2022-04-26 PROCEDURE — 86780 TREPONEMA PALLIDUM: CPT | Performed by: OBSTETRICS & GYNECOLOGY

## 2022-04-26 NOTE — PROGRESS NOTES
OB - New OB History and Physical    HPI: Liam Ellis is a 42 year old  at 13w5d as dated by IVF transfer date.   Estimated Date of Delivery: Oct 27, 2022.    This was a planned pregnancy.  IVF pregnancy, donor egg    Since becoming pregnant, patient reports she's been feeling relatively well.    Hx of PLTCS for arrest of descent after 3 hours of pushing, maternal exhaustion.  3cm extension into broad ligament, brisk bleeding.  Per patient report, one tube was ruptured at time of section, which is why she had pain in labor and bleeding during surgery.    Hx GDMA2--reports she was on oral meds.  Likely Metformin?    Reports exposure to CMV at work.  Didn't know patient had CMV when she saw the patient.  Wondering if she should be concerned.    Ultrasound: 3/11/22, single live IUP measuring 7w0d, size consistent with IVF transfer date    Obstetric history:     OB History    Para Term  AB Living   2 1 1 0 0 1   SAB IAB Ectopic Multiple Live Births   0 0 0 0 1      # Outcome Date GA Lbr David/2nd Weight Sex Delivery Anes PTL Lv   2 Current            1 Term 17 39w2d 20:10 / 04:00 3.005 kg (6 lb 10 oz) M CS-LTranv EPI N TACOS      Birth Comments: hysterotomy extension into right broad ligament- repaired      Complications: Failure to Progress in Second Stage, Gestational diabetes mellitus (GDM), antepartum      Name: Noe      Apgar1: 5  Apgar5: 8     Patient denies history of gestational hypertension, pre-eclampsia,  labor.    Gynecologic History:   Menstrual Interval: 28-30 days  No LMP recorded. Patient is pregnant.   STI history: HPV  Last Pap:   History of abnormal pap: yes, HPV pos    Allergy: Patient has no known allergies.  Patient denies food, latex or environmental allergies.     Current Medications:  Current Outpatient Medications   Medication     aspirin (ASA) 81 MG chewable tablet     Prenatal Vit-Fe Fumarate-FA (PRENATAL COMPLETE PO)     levothyroxine  (SYNTHROID/LEVOTHROID) 75 MCG tablet     No current facility-administered medications for this visit.       Past Medical History:  Past Medical History:   Diagnosis Date     Cervical high risk HPV (human papillomavirus) test positive ,     See problem list     Closed displaced fracture of base of metacarpal bone 2011     TMJ (temporomandibular joint syndrome) 12/10/2013       Past Surgical History:  Past Surgical History:   Procedure Laterality Date     APPENDECTOMY        SECTION N/A 2017    Procedure:  SECTION;;  Surgeon: Tracy Bowser MD;  Location: [facility]     HC COLP CERVIX/UPPER VAGINA W BX CERVIX       HC INSERTION INTRAUTERINE DEVICE      Mirena     HC REMOVE INTRAUTERINE DEVICE         Social History:  Patient lives with boyfriend, son, patient's parents.  Patient's relationship status is: dating for 8-9 years.    Works as RN, oncology nurse.   Denies current tobacco, alcohol or recreational drug use.   She feels safe in her relationship. Patient denies history of sexual, physical or mental abuse.     Family History:  Family History   Problem Relation Age of Onset     Hypertension Mother      Diabetes Mother      Hypothyroidism Father      Glaucoma No family hx of      Macular Degeneration No family hx of        Review of Systems  Gen:  no change in weight, no fever, no chills, no fatigue  CV: no palpitations, no chest pain, no hypertension, no syncope  Resp: no shortness of breath, no cough, no wheezing, no asthma  GI: + nausea, no vomiting, no diarrhea, no constipation, no bloating, no GERD  :  no vaginal discharge, no dysuria, no abnormal bleeding, no pelvic pain   Endo: no thyroid problems, no cold/heat intolerance, no acne, no hirsutism, no diabetes  Heme: no easy bruising or bleeding, no history of DVT/PE/CVA  Neuro: no headaches, no seizures, no strokes, no focal deficits      Physical Exam:  Vitals:    22 0945   BP: 95/67   Pulse: 82   Temp: 97.2  F (36.2   C)   SpO2: 100%   Weight: 54.8 kg (120 lb 12.8 oz)     Body mass index is 23.2 kg/m .  Gen: alert, oriented, no distress,  pleasant, appears stated age, appropriately groomed  Neck: supple, trachea midline, no thyromegaly, no lymphadenopathy  HEENT: head normocephalic, atraumatic, normal oropharynx without erythema or exudates  CV: normal heart sounds, regular rate and rhythm, no murmurs  Resp: good inspiratory effort, lungs clear to ascultation bilaterally, no wheezes or rhonchi  Breast: declines  Abd: soft, nontender, nondistended, normoactive bowel sounds,  no masses or organomegaly, no hernias, well healed  scar  : normal external genitalia with lesions or erythema; normal, well supported urethra, normal Bartholins, normal Skenes; normal pink rugated vaginal mucosa, no lesions or abnormal discharge; regular Melissa speculum inserted without difficulty; normal appearing cervix without lesions, bleeding or discharge. Cervix closed, WNL  Extr: warm, well perfused, nontender, no edema  Psych: affect bright, cooperative, responds appropriately      Assessment:  Liam Ellis is a 42 year old  at 13w5d presenting to establish prenatal care.    Problem List:   AMA: Donor egg aged in her 20s.  PGT on embryos  Referral to M.  Discussed delivery by CINDI    Hx GDMA1:  Early GCT and A1c ordered for today    Hx PLTCS with extension:  Patient has interest in TOLAC, but we did discuss some of my concerns based on her previous delivery.  Will revisit as pregnancy progresses    CMV exposure:  Will check labs today.      Plan:  1. Reviewed routine prenatal care. Discussed MD call schedule as well as role of residents and med students both in clinic and hospital.  She is okay with resident care  2. Pap: obtained today.   3. Diet, Nutrition and Exercise:  Continue PNVs. Continue normal exercise. Her prepregnancy BMI is 24.  According to the WHO guidelines, patient is given a goal of gaining approximately 25-35  pounds during the course of her pregnancy.    4. Immunizations: plan TdaP at 28 weeks  5. Fetal anomaly screening: s/p PGD and using donor egg.  Referred to [facility] for level 2, but declines early screening  6. Routine Prenatal Care: the patient will return to clinic in 4 weeks and prn    May decide to deliver closer to home.  Recommended that if that were the case, she should see doctors who deliver at the hospital she's interested in.  She will consider her options.  Encouraged her to continue care with us until she has an appointment scheduled with another provider.    Sonia Gomez MD

## 2022-04-28 LAB — BACTERIA UR CULT: NO GROWTH

## 2022-04-29 PROCEDURE — 87624 HPV HI-RISK TYP POOLED RSLT: CPT | Performed by: OBSTETRICS & GYNECOLOGY

## 2022-04-29 PROCEDURE — G0145 SCR C/V CYTO,THINLAYER,RESCR: HCPCS | Performed by: OBSTETRICS & GYNECOLOGY

## 2022-05-03 LAB
BKR LAB AP GYN ADEQUACY: NORMAL
BKR LAB AP GYN INTERPRETATION: NORMAL
BKR LAB AP HPV REFLEX: NORMAL
BKR LAB AP PREVIOUS ABNORMAL: NORMAL
PATH REPORT.COMMENTS IMP SPEC: NORMAL
PATH REPORT.COMMENTS IMP SPEC: NORMAL
PATH REPORT.RELEVANT HX SPEC: NORMAL

## 2022-05-05 LAB
HUMAN PAPILLOMA VIRUS 16 DNA: NEGATIVE
HUMAN PAPILLOMA VIRUS 18 DNA: NEGATIVE
HUMAN PAPILLOMA VIRUS FINAL DIAGNOSIS: NORMAL
HUMAN PAPILLOMA VIRUS OTHER HR: NEGATIVE

## 2022-05-15 ENCOUNTER — NURSE TRIAGE (OUTPATIENT)
Dept: NURSING | Facility: CLINIC | Age: 42
End: 2022-05-15
Payer: COMMERCIAL

## 2022-05-15 NOTE — TELEPHONE ENCOUNTER
OB Triage Call      Is patient's OB/Midwife with the [facility] or [facility]? [facility]- Proceed with triage     Reason for call: Diarrhea     Assessment: Patient reports that she has had diarrhea for 2 days. She had 7 diarrhea stools yesterday, has had 3 stools today. Patient denies any fever. She reports that she has some pain when needing to have a stool but then the pain will go away. Patient is trying to increase fluids.    Plan: See physician within 24 hours per protocol. Patient has upcoming appointment on Tuesday. She is advised on home care and symptoms to monitor for.    Patient plans to deliver at N/A    Patient's primary OB Provider is Dr. Gomez.      Per protocol recommendations Patient to schedule follow up appointment within 24 hours.    Is patient's delivering hospital on divert? Does not apply due to Patient to schedule appointment       16w3d    Estimated Date of Delivery: Oct 27, 2022        OB History    Para Term  AB Living   2 1 1 0 0 1   SAB IAB Ectopic Multiple Live Births   0 0 0 0 1      # Outcome Date GA Lbr David/2nd Weight Sex Delivery Anes PTL Lv   2 Current            1 Term 17 39w2d 20:10 / 04:00 3.005 kg (6 lb 10 oz) M CS-LTranv EPI N TACOS      Birth Comments: hysterotomy extension into right broad ligament- repaired      Complications: Failure to Progress in Second Stage, Gestational diabetes mellitus (GDM), antepartum      Name: Noe      Apgar1: 5  Apgar5: 8       Lab Results   Component Value Date    GBS  2017     Negative  No GBS DNA detected, presumed negative for GBS or number of bacteria may be   below the limit of detection of the assay.   Assay performed on incubated broth culture of specimen using [employer] real-time   PCR.            Gilda Zepeda RN 05/15/22 12:42 PM  [facility] Nurse Advisor      Reason for Disposition    [1] SEVERE diarrhea (e.g., 7 or more times / day more than normal) AND [2] present > 24 hours (1  day)    Additional Information    Negative: Shock suspected (e.g., cold/pale/clammy skin, too weak to stand, low BP, rapid pulse)    Negative: Difficult to awaken or acting confused (e.g., disoriented, slurred speech)    Negative: Sounds like a life-threatening emergency to the triager    Negative: Vomiting also present and worse than the diarrhea    Negative: [1] Blood in stool AND [2] without diarrhea    Negative: Diarrhea in a cancer patient who is currently (or recently) receiving chemotherapy or radiation therapy, or cancer patient who has metastatic or end-stage cancer and is receiving palliative care    Negative: [1] SEVERE abdominal pain (e.g., excruciating) AND [2] present > 1 hour    Negative: [1] SEVERE abdominal pain AND [2] age > 60    Negative: [1] Blood in the stool AND [2] moderate or large amount of blood    Negative: Black or tarry bowel movements  (Exception: chronic-unchanged  black-grey bowel movements AND is taking iron pills or Pepto-bismol)    Negative: [1] Drinking very little AND [2] dehydration suspected (e.g., no urine > 12 hours, very dry mouth, very lightheaded)    Negative: Patient sounds very sick or weak to the triager    Negative: [1] SEVERE diarrhea (e.g., 7 or more times / day more than normal) AND [2] age > 60 years    Negative: [1] Constant abdominal pain AND [2] present > 2 hours    Negative: [1] Fever > 103 F (39.4 C) AND [2] not able to get the fever down using Fever Care Advice    Protocols used: DIARRHEA-A-

## 2022-05-15 NOTE — TELEPHONE ENCOUNTER
Provider Response to 2nd Level Triage Request    I have reviewed the RN documentation. My recommendation is:  [place] triage RNs--can you reach out to the patient today and see how she's doing?  Has appt with Don[place]on tomorrow, so if doing ok, can wait to see her.     Sonia Gomez MD

## 2022-05-16 NOTE — TELEPHONE ENCOUNTER
TC to Liam, she reports that she still has loose stools however denies fever, abdominal pain, she is able to eat and drink. She will plan on coming to appointment tomorrow and will alert the clinic if symptoms get worse.  Lulu Weir RN

## 2022-05-17 ENCOUNTER — PRENATAL OFFICE VISIT (OUTPATIENT)
Dept: OBGYN | Facility: CLINIC | Age: 42
End: 2022-05-17
Payer: COMMERCIAL

## 2022-05-17 VITALS
WEIGHT: 121.8 LBS | HEART RATE: 82 BPM | DIASTOLIC BLOOD PRESSURE: 63 MMHG | BODY MASS INDEX: 22.41 KG/M2 | HEIGHT: 62 IN | SYSTOLIC BLOOD PRESSURE: 90 MMHG

## 2022-05-17 DIAGNOSIS — O34.219 PREVIOUS CESAREAN DELIVERY, ANTEPARTUM CONDITION OR COMPLICATION: Primary | ICD-10-CM

## 2022-05-17 DIAGNOSIS — Z20.828 EXPOSURE TO CYTOMEGALOVIRUS (CMV): ICD-10-CM

## 2022-05-17 PROCEDURE — 86645 CMV ANTIBODY IGM: CPT | Performed by: OBSTETRICS & GYNECOLOGY

## 2022-05-17 PROCEDURE — 99207 PR PRENATAL VISIT: CPT | Performed by: OBSTETRICS & GYNECOLOGY

## 2022-05-17 PROCEDURE — 36415 COLL VENOUS BLD VENIPUNCTURE: CPT | Performed by: OBSTETRICS & GYNECOLOGY

## 2022-05-17 NOTE — PROGRESS NOTES
16w5d overall doing ok.  Had loose stools last week that became watery for a few days, seem to be improving now.  No other symptoms, eating and drinking fine.  Monitor for now but if worsening again, may need stool sample. Needs repeat CMV IgM today, ordered.  Has level 2 scheduled, no genetic screening done.  RTC 4 weeks  tonyap

## 2022-05-18 LAB
CMV IGM SERPL IA-ACNC: <8 AU/ML
CMV IGM SERPL IA-ACNC: NEGATIVE

## 2022-05-26 ENCOUNTER — PRE VISIT (OUTPATIENT)
Dept: MATERNAL FETAL MEDICINE | Facility: HOSPITAL | Age: 42
End: 2022-05-26
Payer: COMMERCIAL

## 2022-06-03 ENCOUNTER — OFFICE VISIT (OUTPATIENT)
Dept: MATERNAL FETAL MEDICINE | Facility: HOSPITAL | Age: 42
End: 2022-06-03
Attending: OBSTETRICS & GYNECOLOGY
Payer: COMMERCIAL

## 2022-06-03 ENCOUNTER — ANCILLARY PROCEDURE (OUTPATIENT)
Dept: ULTRASOUND IMAGING | Facility: HOSPITAL | Age: 42
End: 2022-06-03
Attending: OBSTETRICS & GYNECOLOGY
Payer: COMMERCIAL

## 2022-06-03 ENCOUNTER — TELEPHONE (OUTPATIENT)
Dept: OTOLARYNGOLOGY | Facility: CLINIC | Age: 42
End: 2022-06-03

## 2022-06-03 DIAGNOSIS — O09.522 SUPERVISION OF ELDERLY MULTIGRAVIDA IN SECOND TRIMESTER: ICD-10-CM

## 2022-06-03 DIAGNOSIS — O26.90 PREGNANCY RELATED CONDITION, ANTEPARTUM: ICD-10-CM

## 2022-06-03 DIAGNOSIS — Z36.3 ENCOUNTER FOR ROUTINE SCREENING FOR FETAL MALFORMATION USING ULTRASOUND: Primary | ICD-10-CM

## 2022-06-03 DIAGNOSIS — O35.AXX0 FETUS WITH SUSPECTED CLEFT LIP AND PALATE, ANTEPARTUM, SINGLE OR UNSPECIFIED FETUS: Primary | ICD-10-CM

## 2022-06-03 PROCEDURE — 99207 PR NO CHARGE LOS: CPT | Performed by: GENETIC COUNSELOR, MS

## 2022-06-03 PROCEDURE — 76811 OB US DETAILED SNGL FETUS: CPT | Mod: 26 | Performed by: OBSTETRICS & GYNECOLOGY

## 2022-06-03 PROCEDURE — 99203 OFFICE O/P NEW LOW 30 MIN: CPT | Mod: 25 | Performed by: OBSTETRICS & GYNECOLOGY

## 2022-06-03 PROCEDURE — 76811 OB US DETAILED SNGL FETUS: CPT

## 2022-06-03 PROCEDURE — 96040 HC GENETIC COUNSELING, EACH 30 MINUTES: CPT | Performed by: GENETIC COUNSELOR, MS

## 2022-06-03 NOTE — PROGRESS NOTES
Updated PCCs on cleft lip/palate finding from today. Also emailed cleft team.  Patient is scheduled for Limited/Amnio next week at [facility] on 6/9 along with a peds cards echo in July and a follow up growth at 32wks.

## 2022-06-03 NOTE — PROGRESS NOTES
[facility]  Genetic Counseling Consult    Patient:  Liam Ellis YOB: 1980   Date of Service:  22      Liam Ellis was seen at the [facility] for genetic consultation as part of her appointment for comprehensive ultrasound.  The indication for genetic counseling is advanced maternal age. She was accompanied by her son.       Impression/Plan:   1. Liam has not had serum screening in this pregnancy.     2. Liam had a comprehensive (level II) ultrasound today.  Please see the ultrasound report for further details.    3. The patient had originally planned aneuploidy screening via NIPT. However, due to the ultrasound abnormality, Liam decided to plan for an amniocentesis next week. She choose not to do the NIPT since the amniocentesis would cover the same conditions and be more accurate. We briefly discussed one test to look for those conditions on NIPT is typically resulted within 24-48 hours after the amniocentesis.     4. A fetal echocardiogram is recommended given the IVF conception and finding of cleft lip and palate.     Pregnancy History:   /Parity:    Age at Delivery: 42 year old  CINDI: 10/27/2022, by Embryo Transfer  Gestational Age: 19w1d    No significant complications or exposures were reported in the current pregnancy.    Liam s pregnancy history is significant for one term delivery by c section with complications in her right fallopian tube which required repair    This pregnancy was conceived through in vitro fertilization (IVF). We discussed the following:    Transfer date: 2022    Number of transferred embryos: 1    Use of egg or sperm donor: Egg donor, 32 at the time of donation, partner sperm    Age of egg retrieval: 32 (donor age)    Fresh or frozen transfer: Frozen       Preimplantation genetic testing (PGT): Performed, normal embryo (did not find out male or female  embryo)    Of note, this pregnancy was conceived by IVF and embryos did have preimplantation genetic testing for aneuploidy (PGT-A) prior to transfer. I discussed with the patient that PGT-A  is a screening test and that a normal PGT-A result significantly reduces but does not eliminate the possibility of aneuploidy. Invasive testing (such as amniocentesis) is recommended if the patient desires definitive information regarding aneuploidy in pregnancy. We discussed the limitation of NIPT following PGT-A and that pursuing multiple screening tests may result in conflicting information in which case the option of invasive testing would be reviewed again.     The PGT-A report was not available for our review. Per patient report, the embryo transferred was the only one with normal chromosome results.       Fetal echocardiogram will be recommended and scheduled after the 18-20 week fetal anatomy ultrasound    The average pregnancy has a 0.5-1.0% chance of a congenital heart defect. However, studies suggest an increased chance for a heart defect for IVF pregnancies, with estimates ranging from 1-3.3%. Fetal echocardiograms are typically performed at 20 to 24 weeks gestation.      Medical History:   iLam szymanski reported medical history is not expected to impact pregnancy management or risks to fetal development.       Family History:   A three-generation pedigree was obtained during a previous visit (11/01/2016) , and is scanned under the  Media  tab. Please see that note for more details.     Limited information was available regarding the donor's family history. Partner family history is also limited. No family history of birth defects        Carrier Screening:   Expanded carrier screening for mutations in a large panel of genes associated with autosomal recessive conditions including cystic fibrosis, spinal muscular atrophy, and others, is now available.    The egg donor had  carrier screening for 176 conditions through  [email or web address], the results of which were negative.  A copy of the report was not available for our review today.       Risk Assessment for Chromosome Conditions:   We explained that the risk for fetal chromosome abnormalities increases with maternal age. We discussed specific features of common chromosome abnormalities, including Down syndrome, trisomy 13, trisomy 18, and sex chromosome trisomies. These risks are based on the age of the egg donor:      At age 32 at midtrimester, the risk to have a baby with Down syndrome is 1 in 508.     At age 32 at midtrimester, the risk to have a baby with any chromosome abnormality is 1 in 254.     The risks are likely lower given the normal PGT-A on the embryo. Due to limitations of PTG-A, however, the risk is now reduced to zero.      Liam did not have maternal serum screening earlier in pregnancy.        Testing Options:   We discussed the following options:   Non-invasive Prenatal Testing (NIPT)    Maternal plasma cell-free DNA testing; first trimester ultrasound with nuchal translucency and nasal bone assessment is recommended, when appropriate    Screens for fetal trisomy 21, trisomy 13, trisomy 18, and sex chromosome aneuploidy    Cannot screen for open neural tube defects; maternal serum AFP after 15 weeks is recommended     Genetic Amniocentesis    Invasive procedure typically performed in the second trimester by which amniotic fluid is obtained for the purpose of chromosome analysis and/or other prenatal genetic analysis    Diagnostic results; >99% sensitivity for fetal chromosome abnormalities    AFAFP measurement tests for open neural tube defects     Comprehensive (Level II) ultrasound: Detailed ultrasound performed between 18-22 weeks gestation to screen for major birth defects and markers for aneuploidy.    We reviewed the benefits and limitations of this testing.  Screening tests provide a risk assessment specific to the pregnancy for certain fetal chromosome  abnormalities, but cannot definitively diagnose or exclude a fetal chromosome abnormality.  Follow-up genetic counseling and consideration of diagnostic testing is recommended with any abnormal screening result.     Diagnostic tests carry inherent risks- including risk of miscarriage- that require careful consideration.  These tests can detect fetal chromosome abnormalities with greater than 99% certainty.  Results can be compromised by maternal cell contamination or mosaicism, and are limited by the resolution of cytogenetic G-banding technology.  There is no screening nor diagnostic test that can detect all forms of birth defects or mental disability.    It was a pleasure to be involved with [facility]. Face-to-face time of the meeting was 45 minutes.    Valeria Gonzáles MS, [facility]  Licensed Genetic Counselor   [facility]  Maternal Fetal Medicine  [email or web address]  [email or web address]  Office: [phone]  Pager [phone]  MFM: [phone]   Fax: [phone]

## 2022-06-03 NOTE — PROGRESS NOTES
Patient here for detailed anatomy scan for IVF. She is a 42 year old   at 19 weeks and 1 day who conceived by IVF. Patient has had aneuploidy risk assessment with PGS-A. She is now at 19w1d gestational age.     Active single fetus with normal behavior for gestational age.     Estimated fetal weight is appropriate for gestational age.     Normal amniotic fluid volume.     There is a large left side cleft in the lip and palate the extends posteriorly and compromises the entire hard palate. Otherwise normal fetal anatomy on detailed review.     Mid-trimester formatted genetic scan was completed. Over 20 individual ultrasound markers for aneuploidy were evaluated.  There are two EIF seen in the left ventricle.    The cervix appears closed on abdominal examination.       We discussed the findings on today's ultrasound with the patient. Many syndromes have cleft as a part of their phenotypes and a cleft may be the only sign of a potential serious aneuploidy. Severe additional anomalies can result in poor outcome, such as mortality. Non-syndromic isolated CL   CP has low mortality and morbidity rates and are primarily a functional and esthetic problem. CL only is associated with a very small percentage of chromosomal anomalies. Approximately 80% of cases with cleft lip have cleft palate. Complete CL + CP extend into the nares. Incomplete CL + CP dos not extend into the nares. In the prenatal population, fetus with CL + CP with cleft secondary palate (posterior soft palate) often has chromosomal abnormalities or other anomalies incompatible with survival. Because fetuses with CL + CP can die in utero or are spontaneously aborted and are never seen as newborns whenever CL   CP is associated with aneuploidy or other malformations we recommend ongoing surveillance and diagnostic genetic testing. Ultrasonographic examination can never rule out a chromosomal aberration. Therefore, patients should receive genetic  counseling and should be offered karyotypic analysis of their fetus, as this carries the highest risk of adverse outcome and occurs in approximately 5-15% of cases of CL + CP, and increases when bilateral or midline defect.     We discussed the availability of amniocentesis for the precise diagnosis of chromosomal abnormalities. Patient consents to proceed with meeting with our genetic counselor and have amniocentesis for diagnostic genetic testing with microarray.      Recommend subsequent scan at 32 weeks with referral to CL/CP team at 32 weeks for prenatal consultation.    Recommend fetal ECHO for IVF at 22-24 weeks.    Patient is aware and understands why she has come for her ultrasound today and states that she feels that all of her questions have been answered to her satisfaction.     Thank you for the opportunity to participate in the care of this patient.  If you have questions regarding today's evaluation or if we can be of further service, please contact the [facility] Center.       Brent Allen M.D.    I spent 30 minutes face-face-to-face with the patient during the visit.   Diagnosis. Fetal cleft lip and palate.  Data. I have reviewed tests and medical history.  We discussed findings and prognosis.  Plan to follow up at 32 weeks with referral to CL/CP team.

## 2022-06-03 NOTE — TELEPHONE ENCOUNTER
Liam Ellis is under the care of Dr. Douglass.  The family is being contacted to schedule a Cleft Clinic appointment.     A message was left for patient/family requesting a call back to schedule an appointment.  The clinic phone number was provided.    Sonali Berger

## 2022-06-09 ENCOUNTER — LAB (OUTPATIENT)
Dept: LAB | Facility: CLINIC | Age: 42
End: 2022-06-09
Attending: OBSTETRICS & GYNECOLOGY
Payer: COMMERCIAL

## 2022-06-09 ENCOUNTER — OFFICE VISIT (OUTPATIENT)
Dept: MATERNAL FETAL MEDICINE | Facility: CLINIC | Age: 42
End: 2022-06-09
Attending: OBSTETRICS & GYNECOLOGY
Payer: COMMERCIAL

## 2022-06-09 DIAGNOSIS — O28.3 ABNORMAL FETAL ULTRASOUND: ICD-10-CM

## 2022-06-09 DIAGNOSIS — O09.522 SUPERVISION OF ELDERLY MULTIGRAVIDA IN SECOND TRIMESTER: ICD-10-CM

## 2022-06-09 DIAGNOSIS — O35.AXX0 FETAL CLEFT LIP AND PALATE AFFECTING MANAGEMENT OF MOTHER IN SINGLETON PREGNANCY, ANTEPARTUM: ICD-10-CM

## 2022-06-09 DIAGNOSIS — O26.90 PREGNANCY RELATED CONDITION, ANTEPARTUM: ICD-10-CM

## 2022-06-09 DIAGNOSIS — Z36.3 ENCOUNTER FOR ROUTINE SCREENING FOR FETAL MALFORMATION USING ULTRASOUND: ICD-10-CM

## 2022-06-09 DIAGNOSIS — Z36.9 ANTENATAL SCREENING ENCOUNTER: ICD-10-CM

## 2022-06-09 DIAGNOSIS — Z36.9 ANTENATAL SCREENING ENCOUNTER: Primary | ICD-10-CM

## 2022-06-09 PROCEDURE — 36415 COLL VENOUS BLD VENIPUNCTURE: CPT

## 2022-06-09 PROCEDURE — 96040 HC GENETIC COUNSELING, EACH 30 MINUTES: CPT | Performed by: GENETIC COUNSELOR, MS

## 2022-06-09 NOTE — PROGRESS NOTES
[facility]  Genetic Counseling Consult    Patient:  Liam Ellis YOB: 1980   Date of Service:  6/09/22      Liam Ellis was seen at the [facility] for genetic consultation for the indication of consenting to amniocentesis due to large left-sided fetal cleft lip and palate (CL&P). Liam was unaccompanied to today's consult.       Impression/Plan:   1.  Liam had a genetic consultation today, during which we reviewed the option of genetic amniocentesis vs NIPT in light of the CL&P affecting the current pregnancy. After a review of the risks associated with the amniocentesis procedure, Liam elected to forego diagnostic prenatal testing during today's consult, specifically stating that she does no wish to do anything that would incur increased risks for miscarriage in the current pregnancy.     2.  Liam elected to proceed with NIPT analysis, via [facility], of chromosomes 21, 18, 13, sex chromosome aneuploidy, and 22q11.2 deletion syndrome. We discussed, in detail, the risks, benefits, possible PPV values, and utility of these NIPT results in the context of the CL&P as well as the two echogenic intracardiac foci noted on her level II ultrasound from 6/3/22. Liam verbalized understanding and has elected to proceed at this time. Results are expected in 7-10 days from today and will be called out to the patient for review once available and forwarded to the referring OB provider. In the event we are unable to contact the patient once results become available, she has requested we leave a detailed voicemail fully disclosing the results, including confirming predicted female fetal sex, at her mobile telephone number.     3.  Patient is scheduled to return to our office for follow-up level II ultrasound on 7/22/22 and 9/2/22. She is scheduled for a craniofacial consult on 6/27/22. Fetal echocardiogram is scheduled for  22.     Pregnancy History:   /Parity:     Age at Delivery: 42 year old  CINDI: 10/27/2022, by Embryo Transfer  Gestational Age: 20w0d    No significant complications were reported in the current pregnancy. Of note, Liam reported CMV exposure this pregnancy and has undergone titer testing. IgG is positive, IgM on 22 and 22 are negative, suggesting that she does not have a recent infection      Liam szymanski pregnancy history is significant for one term delivery by  with complications in her right fallopian tube which required repair     This pregnancy was conceived through in vitro fertilization (IVF). The following was discussed with the patient when she initially met with genetic counseling on 6/3/22:    Transfer date: 2022    Number of transferred embryos: 1    Use of egg or sperm donor: Egg donor, 32 at the time of donation, partner sperm    Age of egg retrieval: 32 (donor age)    Fresh or frozen transfer: Frozen        Preimplantation genetic testing (PGT): Performed, normal embryo (did not find out male or female embryo)  ? Of note, this pregnancy was conceived by IVF and embryos did have preimplantation genetic testing for aneuploidy (PGT-A) prior to transfer. I discussed with the patient that PGT-A  is a screening test and that a normal PGT-A result significantly reduces but does not eliminate the possibility of aneuploidy. Invasive testing (such as amniocentesis) is recommended if the patient desires definitive information regarding aneuploidy in pregnancy. We discussed the limitation of NIPT following PGT-A and that pursuing multiple screening tests may result in conflicting information in which case the option of invasive testing would be reviewed again.   ? The PGT-A report was not available for our review. Per patient report, the embryo transferred was the only one with normal chromosome results.     Medical History:   Liam szymanski reported medical history is not  expected to impact pregnancy management or risks to fetal development.       Family History:   A three-generation pedigree was obtained during a previous visit (11/01/2016) , and is scanned under the  Media  tab. Please see that note for more details.      Limited information was available regarding the donor's family history. Partner family history is also limited. No family history of birth defects.       Carrier Screening:   Expanded carrier screening for mutations in a large panel of genes associated with autosomal recessive conditions including cystic fibrosis, spinal muscular atrophy, and others, is now available.     The egg donor had  carrier screening for 176 conditions through [address], the results of which were negative.  A copy of the report was not available for our review today.       Risk Assessment:   We explained that the risk for fetal chromosome abnormalities increases with maternal age. We discussed specific features of common chromosome abnormalities, including Down syndrome, trisomy 13, trisomy 18, and sex chromosome trisomies. These risks are based on the age of the egg donor:       At age 32 at midtrimester, the risk to have a baby with Down syndrome is 1 in 508.     At age 32 at midtrimester, the risk to have a baby with any chromosome abnormality is 1 in 254.     We again reviewed today' that the risks are likely lower given the normal PGT-A on the embryo. Due to limitations of PTG-A, however, the risk is now reduced to zero.       Liam did not have maternal serum screening earlier in pregnancy.     We also spent time reviewing the soft marker finding of echogenic intracardiac foci (EIF) during today's consult. EIF refers to a small bright spot in the heart. This finding is  not associated with heart defects or other heart problems and tends to resolve on its own prior to birth or postnatally. Some studies have suggested that this ultrasound findingis thought to be seen with a  higher frequency in pregnancies with a chromosome problem (particularly Down syndrome), as compared to pregnancies that do not have a chromosome problem. Of fetuses with Down syndrome ([id number]), approximately 20-28% present with an EIF. Because of this, it is thought that this ultrasound finding may increase the risk of a chromosome problem in a pregnancy. Of fetuses who do not have Down syndrome, approximately 4-7% present with an EIF. An isolated EIF in a fetus of an otherwise low-risk patient does not necessarily confer a significantly increased risk for fetal aneuploidy. Genetic screening may still be of interest to patients whose pregnancies present with an EIF.Additionally, we reviewed that EIFs are more commonly seen in pregnancies with  ancestry (up to 30%) and that there are again, no associated structural heart anomalies that are identified. The patient verbalized understanding. A handout on EIFs created by [facility] was provided to the patient today.    We reviewed the following information about genetic amniocentesis during today's consult:      Genetic Amniocentesis  - This is an invasive prenatal procedure, typically performed at 15 weeks or later, through which amniotic fluid is obtained for the purpose of chromosome analysis and/or other prenatal genetic analysis or infection studies.  - Amniocentesis is considered a diagnostic test for chromosome problems during pregnancy.  - The risk of pregnancy loss associated with amniocentesis is generally estimated to be 1/500 (0.2%) or less.  - Amniotic fluid AFP (AF-AFP) measurement can also done to screen for the possibility of open neural tube or ventral defects.    Due to the risk for pregnancy loss with an amniocentesis, Liam has elected to forego prenatal diagnostic testing during today's consult. She does strongly desire  genetic testing for the current pregnancy, and is open to meeting with genetics after delivery. CHANI  genetic counseling will facilitate coordination of  genetics evaluation for the patient.     Liam also inquired about how her son's diagnosis of mild Autism may impact the current pregnancy. We discussed that We reviewed her family history of autism. Autism spectrum disorders (ASD) are characterized by abnormalities in language and social cognition. Developmental delays and autism spectrum disorder are most often caused by a combination of both genetic and environmental factors (multifactorial). Most cases of autism are idiopathic, in which no one specific cause is identified.  However, in more rare cases, we discussed that a broad number of genetic etiologies have been identified over the years. Some of these genetic causes can be identified with genetic testing, but not all. If a genetic cause is identified in the affected individual, this can provide information on prognosis and other potential symptoms related to the genetic change. A definitive genetic etiology may elucidate associated medical risks that lead to screening and management changes. It can also increase a family's ability to obtain needs-based services. Specific recurrence-risk counseling--beyond general multifactorial information--can be provided, and targeted testing of at-risk family members can be offered (including parental and or prenatal testing). Finally, an established diagnosis will help in eliminating unnecessary diagnostic tests. In light of these expected benefits, a genetic evaluation is indicated for every person with an ASD (or his or her family). We reviewed that the patient's son's pediatrician can make a referral to the pediatric genetics clinic for the family to attempt to obtain more information about their son's high functioning ASD etiology. The rate of success for identifying a specific etiologic diagnosis in persons with ASDs has been reported to be around 6-25%. (Gabe and Gregg, 2017). His results  could dictate testing for Liam's daughter after delivery. Liam verbalized understanding.    FANTA Tobar, & DEANA Fabian. (2017). Autism spectrum disorders: an updated guide for genetic counseling. [facility] ([facility], [place]), 15(2), 233-238. https://doi.org/10.1590/-88412988VK3140     Testing Options:   We discussed the following options:     Non-invasive Prenatal Testing (NIPT)    Maternal plasma cell-free DNA testing; first trimester ultrasound with nuchal translucency and nasal bone assessment is recommended, when appropriate    Screens for fetal trisomy 21, trisomy 13, trisomy 18, and sex chromosome aneuploidy    Cannot screen for open neural tube defects; maternal serum AFP after 15 weeks is recommended       Genetic Amniocentesis    Invasive procedure typically performed in the second trimester by which amniotic fluid is obtained for the purpose of chromosome analysis and/or other prenatal genetic analysis    Diagnostic results; >99% sensitivity for fetal chromosome abnormalities    AFAFP measurement tests for open neural tube defects       Comprehensive (Level II) ultrasound: Detailed ultrasound performed between 18-22 weeks gestation to screen for major birth defects and markers for aneuploidy.      We reviewed the benefits and limitations of this testing.  Screening tests provide a risk assessment specific to the pregnancy for certain fetal chromosome abnormalities, but cannot definitively diagnose or exclude a fetal chromosome abnormality.  Follow-up genetic counseling and consideration of diagnostic testing is recommended with any abnormal screening result.     Diagnostic tests carry inherent risks- including risk of miscarriage- that require careful consideration.  These tests can detect fetal chromosome abnormalities with greater than 99% certainty.  Results can be compromised by maternal cell contamination or mosaicism, and are limited by the resolution of cytogenetic G-banding  technology.  There is no screening nor diagnostic test that can detect all forms of birth defects or mental disability.     It was a pleasure to be involved with Liam s care. Face-to-face time of the meeting was 45 minutes.    Marcy Macario MS, [facility]  Genetic Counselor  [facility]  Maternal Fetal Medicine  Ph: [phone]   Pager: [phone]

## 2022-06-14 ENCOUNTER — TELEPHONE (OUTPATIENT)
Dept: MATERNAL FETAL MEDICINE | Facility: CLINIC | Age: 42
End: 2022-06-14
Payer: COMMERCIAL

## 2022-06-14 LAB — SCANNED LAB RESULT: NORMAL

## 2022-06-14 NOTE — TELEPHONE ENCOUNTER
June 14, 2022    I spoke with Liam regarding her NIPT results.     Results indicate NO ANEUPLOIDY DETECTED for chromosomes 21, 18, 13, or the sex chromosomes (XX). Additionally, screening for 22q11.2 deletion was low-risk.     This puts her current pregnancy at low risk for Down syndrome, trisomy 18, trisomy 13 and sex chromosome abnormalities. This test is reported to have the following sensitivities: Down syndrome- >99.9%, trisomy 18- >99.9%, and trisomy 13- >99.9%. Although these results are reassuring, this does not replace a standard chromosome analysis from a chorionic villus sampling or amniocentesis.     We reviewed that this screening is not comprehensive for all genetic conditions and does not replace the testing that could be offered via amniocentesis. We also reviewed that an EIF in the context of a normal NIPT is generally considered normal variation. Liam confirmed that she is not planning to proceed with amniocentesis at this time. We discussed that Liam's case will be reviewed with the pediatric genetics team and we will likely plan for her child to be seen as an outpatient in Genetics clinic. Liam expressed her understanding, had no other questions, and is feeling reassured.     Her results are available in her Epic chart for her primary OB to review.     Ping Royal MS, [facility]  Licensed Genetic Counselor  [facility]  Maternal Fetal Medicine  [email or web address]  [phone]

## 2022-06-21 ENCOUNTER — PRENATAL OFFICE VISIT (OUTPATIENT)
Dept: OBGYN | Facility: CLINIC | Age: 42
End: 2022-06-21
Payer: COMMERCIAL

## 2022-06-21 VITALS
BODY MASS INDEX: 23.42 KG/M2 | OXYGEN SATURATION: 98 % | WEIGHT: 126 LBS | HEART RATE: 85 BPM | DIASTOLIC BLOOD PRESSURE: 62 MMHG | SYSTOLIC BLOOD PRESSURE: 89 MMHG

## 2022-06-21 DIAGNOSIS — O09.529 ENCOUNTER FOR SUPERVISION OF MULTIGRAVIDA WITH ADVANCED MATERNAL AGE: Primary | ICD-10-CM

## 2022-06-21 PROCEDURE — 99207 PR PRENATAL VISIT: CPT | Performed by: OBSTETRICS & GYNECOLOGY

## 2022-06-21 NOTE — PROGRESS NOTES
This is donor egg and IVF. Found cleft lip and palate on this baby girl. She did NIPT that was normal and doesn't want to do amino if not going to terminate pregnancy. Lots of FM with this baby. Had GDM last pregnancy and will do 3 hour at next visit. Wants to see LAURA. RTC 4 weeks. BE

## 2022-06-27 ENCOUNTER — OFFICE VISIT (OUTPATIENT)
Dept: OTOLARYNGOLOGY | Facility: CLINIC | Age: 42
End: 2022-06-27
Attending: OBSTETRICS & GYNECOLOGY
Payer: COMMERCIAL

## 2022-06-27 VITALS — TEMPERATURE: 96.6 F

## 2022-06-27 DIAGNOSIS — O35.AXX0 PREGNANCY COMPLICATED BY FETAL FACIAL ABNORMALITY, NOT APPLICABLE OR UNSPECIFIED FETUS: Primary | ICD-10-CM

## 2022-06-27 PROCEDURE — G0463 HOSPITAL OUTPT CLINIC VISIT: HCPCS

## 2022-06-27 PROCEDURE — 99204 OFFICE O/P NEW MOD 45 MIN: CPT | Performed by: OTOLARYNGOLOGY

## 2022-06-27 NOTE — PATIENT INSTRUCTIONS
1.  You were seen in the ENT Clinic today by Dr. Douglass. If you have any questions or concerns after your appointment, please call [phone].    2.  Plan is to call clinic once baby is born     Thank you!  Ambrosio Edmonds RN

## 2022-06-27 NOTE — NURSING NOTE
Chief Complaint   Patient presents with     Ent Problem     Cleft referral       Temp (!) 96.6  F (35.9  C) (Temporal)     MARILYN Purdy, EMT

## 2022-06-27 NOTE — LETTER
2022      RE: Liam Ellis   [facility] [address]     Dear Colleague,    Thank you for the opportunity to participate in the care of your patient, Liam Ellis, at the [facility] at [facility]. Please see a copy of my visit note below.    Pediatric Otolaryngology and Facial Plastic Surgery    CC:   Chief Complaints and History of Present Illnesses   Patient presents with     Ent Problem     Cleft referral       Referring Provider: Alejandro:  Date of Service: 2022      Dear Dr. Allen,    I had the pleasure of meeting Liam Ellis in consultation today at your request in the [facility].    HPI:  Liam is a 42 year old female who presents with a chief complaint of who presents today for a prenatal evaluation regarding cleft lip and palate.  No known clefting in the family.  IVF with donor egg.  Here today discuss medical surgical management.      PMH:  Past Medical History:   Diagnosis Date     Cervical high risk HPV (human papillomavirus) test positive ,     See problem list     Closed displaced fracture of base of metacarpal bone 2011     TMJ (temporomandibular joint syndrome) 12/10/2013        PSH:  Past Surgical History:   Procedure Laterality Date     APPENDECTOMY        SECTION N/A 2017    Procedure:  SECTION;;  Surgeon: Tracy Bowser MD;  Location: [facility] L[facility]     HC COLP CERVIX/UPPER VAGINA W BX CERVIX       HC INSERTION INTRAUTERINE DEVICE      Mirena     HC REMOVE INTRAUTERINE DEVICE         Medications:    Current Outpatient Medications   Medication Sig Dispense Refill     aspirin (ASA) 81 MG chewable tablet Take 81 mg by mouth daily       Prenatal Vit-Fe Fumarate-FA (PRENATAL COMPLETE PO)        hydrocortisone (ANUSOL-HC) 25 MG suppository Place 1 suppository (25 mg) rectally 2 times daily as needed for hemorrhoids  (Patient not taking: Reported on 7/5/2022) 24 suppository 0       Allergies:   No Known Allergies    Social History:  No smoke exposure   Social History     Socioeconomic History     Marital status: Single     Spouse name: partner- Oscar     Number of children: 1     Years of education: Not on file     Highest education level: Not on file   Occupational History     Occupation: nursing asst     Occupation: RN student   Tobacco Use     Smoking status: Never Smoker     Smokeless tobacco: Never Used   Vaping Use     Vaping Use: Never used   Substance and Sexual Activity     Alcohol use: No     Alcohol/week: 0.0 standard drinks     Drug use: No     Sexual activity: Yes     Partners: Male     Birth control/protection: None   Other Topics Concern     Parent/sibling w/ CABG, MI or angioplasty before 65F 55M? No   Social History Narrative     Not on file     Social Determinants of Health     Financial Resource Strain: Not on file   Food Insecurity: Not on file   Transportation Needs: Not on file   Physical Activity: Not on file   Stress: Not on file   Social Connections: Not on file   Intimate Partner Violence: Not on file   Housing Stability: Not on file       FAMILY HISTORY:   No bleeding/Clotting disorders       Family History   Problem Relation Age of Onset     Hypertension Mother      Diabetes Mother      Hypothyroidism Father      Glaucoma No family hx of      Macular Degeneration No family hx of        REVIEW OF SYSTEMS:  12 point ROS obtained and was negative other than the symptoms noted above in the HPI.    PHYSICAL EXAMINATION:  Temp (!) 96.6  F (35.9  C) (Temporal)   Deferred secondary to prenatal nature of visit.    Imaging reviewed: US 6/3/2022  There is a large left side cleft in the lip and palate the extends posteriorly and compromises the entire hard palate. Otherwise normal fetal anatomy on detailed review.    Impressions and Recommendations:  Liam is a 42 year old female with We had a long detailed  discussion regarding cleft embryology, cleft care, cleft surgery, expected postoperative course and outcomes.  We discussed associated issues including dental, hearing and speech.  We discussed feeding and weight gain.  Reviewed in detail pre-and postoperative photographs including surgical planning.  At this point I would like her to contact our office once baby is born.  At that point will have her be seen with baby at approximately 2-3 weeks.  We will plan for lip repair at approximately 2 and half months.      Thank you for allowing me to participate in the care of Liam. Please don't hesitate to contact me.    Ramakrishna Douglass MD  Pediatric Otolaryngology and Facial Plastic Surgery  Department of Otolaryngology  [facility]   Clinic [phone]   Pager [phone]   [email or web address]      I spent a total of 45 minutes face-to-face or coordinating care of Liam.  Over 50% of my time on the unit was spent counseling the patient and /or coordinating care regarding cleft lip/palate.

## 2022-07-02 ENCOUNTER — NURSE TRIAGE (OUTPATIENT)
Dept: NURSING | Facility: CLINIC | Age: 42
End: 2022-07-02

## 2022-07-02 NOTE — TELEPHONE ENCOUNTER
23w2d  Painful hemorrhoid.   Per the protocol, I instructed patient to go to UC within 24 hrs.    Caller stated understanding and agreement.    Reason for Disposition    MODERATE rectal pain (i.e., interferes with school, work, or sleep)    Additional Information    Negative: [1] In labor AND [2] feels like pushing (or feels like having a BM)    Negative: Sounds like a life-threatening emergency to the triager    Negative: Sexual assault    Negative: Injury to rectum    Negative: [1] Pregnant 23 or more weeks AND [2] baby is moving less today (e.g., kick count < 5 in 1 hour or < 10 in 2 hours)    Negative: SEVERE rectal pain (e.g., excruciating, unable to have a bowel movement)    Negative: [1] Rectal pain or redness AND [2] fever > 100.4 F (38.0 C)    Negative: Patient sounds very sick or weak to the triager    Protocols used: PREGNANCY - RECTAL SYMPTOMS-A-AH

## 2022-07-03 ENCOUNTER — OFFICE VISIT (OUTPATIENT)
Dept: FAMILY MEDICINE | Facility: CLINIC | Age: 42
End: 2022-07-03
Payer: COMMERCIAL

## 2022-07-03 ENCOUNTER — HEALTH MAINTENANCE LETTER (OUTPATIENT)
Age: 42
End: 2022-07-03

## 2022-07-03 ENCOUNTER — MYC MEDICAL ADVICE (OUTPATIENT)
Dept: OBGYN | Facility: CLINIC | Age: 42
End: 2022-07-03

## 2022-07-03 VITALS
BODY MASS INDEX: 23.57 KG/M2 | WEIGHT: 126.8 LBS | OXYGEN SATURATION: 100 % | RESPIRATION RATE: 16 BRPM | TEMPERATURE: 98.4 F | SYSTOLIC BLOOD PRESSURE: 94 MMHG | HEART RATE: 75 BPM | DIASTOLIC BLOOD PRESSURE: 63 MMHG

## 2022-07-03 DIAGNOSIS — K64.4 EXTERNAL HEMORRHOIDS: Primary | ICD-10-CM

## 2022-07-03 PROCEDURE — 99213 OFFICE O/P EST LOW 20 MIN: CPT | Performed by: NURSE PRACTITIONER

## 2022-07-03 RX ORDER — HYDROCORTISONE ACETATE 25 MG/1
25 SUPPOSITORY RECTAL 2 TIMES DAILY PRN
Qty: 24 SUPPOSITORY | Refills: 0 | Status: ON HOLD | OUTPATIENT
Start: 2022-07-03 | End: 2022-10-26

## 2022-07-03 ASSESSMENT — ENCOUNTER SYMPTOMS
NAUSEA: 0
CONSTIPATION: 0
FEVER: 0
RECTAL PAIN: 1
ROS GI COMMENTS: HEMORRHOID
CHILLS: 0

## 2022-07-03 NOTE — PATIENT INSTRUCTIONS
Please continue to maintain regular bowel movement schedule and taking MiraLAX.  Please consider using witch hazel wipes/Tucks wipes after each bowel movement  Follow-up with OB/primary care provider symptoms are improving as discussed.

## 2022-07-03 NOTE — LETTER
[address] [address]  Phone: [phone]  Fax: [phone]    July 3, 2022        Liam Ellis  [address] [facility] [address]          To whom it may concern:    RE: Liam Ellis    Patient was seen and treated today at our clinic and missed work. Patient may return to work on 07/5/2022, if symptoms have improved..    Please contact me for questions or concerns.      Sincerely,        ELLEN Olivera CNP

## 2022-07-03 NOTE — PROGRESS NOTES
Patient presents with:  Rectal Problem: Hemorrhoids, unable to sit properly       Clinical Decision Making: Focused exam positive for rectal exam revealing 1 external hemorrhoid, that is erythemic and mildly inflamed at 8:00 marker with tenderness to superficial touch.  No fissures or open areas.    Clinical presentation consistent with external inflamed hemorrhoid, easily reduced in clinic.  Discussed the role of OTC hemorrhoid suppositories for relief along with continued sitz bath's and topical hazelwitch wipes with each bowel movement. Encouraged follow-up with PCP/OB provider as needed.  Discussed red flag symptoms with hemorrhoids and when to return for reevaluation. Education provided.      ICD-10-CM    1. External hemorrhoids  K64.4 hydrocortisone (ANUSOL-HC) 25 MG suppository       Patient Instructions   Please continue to maintain regular bowel movement schedule and taking MiraLAX.  Please consider using witch hazel wipes/Tucks wipes after each bowel movement  Follow-up with OB/primary care provider symptoms are improving as discussed.      HPI: Liam Ellis is a 42 year old female who 23 weeks pregnant, presents today complaining of painful hemorrhoids affecting ability to sit properly for the past 3 days.  Patient reports using OTC Tylenol, cool compress, sitz bath and topical lidocaine cream with limited relief. Reports pain is a 9-10/10 on the numeric pain scale.  Patient reports that this is her second pregnancy and has not had previous hemorrhoid history. Reports taking MiraLAX to assist with softer stools over the past couple of weeks, with improvement.    History obtained from the patient.    Problem List:  2022: Need for Tdap vaccination  2017-10: Encounter for insertion of intrauterine contraceptive device   (IUD)  2017: S/P  section  2017: Pregnancy, obstetrical care  2017: Labor and delivery indication for care or intervention  2017: Gestational diabetes mellitus (GDM)  in third trimester   controlled on oral hypoglycemic drug  2017-03: Diet controlled gestational diabetes mellitus (GDM) in third   trimester  2016-10: Supervision of normal first pregnancy, antepartum  2016-10: Need for Tdap vaccination  2013-12: TMJ (temporomandibular joint syndrome)  2012-03: Appendicitis  2011-08: CARDIOVASCULAR SCREENING; LDL GOAL LESS THAN 130  2011-06: Closed displaced fracture of base of metacarpal bone  Cervical high risk HPV (human papillomavirus) test positive      Past Medical History:   Diagnosis Date     Cervical high risk HPV (human papillomavirus) test positive 2018, 2019    See problem list     Closed displaced fracture of base of metacarpal bone 6/6/2011     TMJ (temporomandibular joint syndrome) 12/10/2013       Social History     Tobacco Use     Smoking status: Never Smoker     Smokeless tobacco: Never Used   Substance Use Topics     Alcohol use: No     Alcohol/week: 0.0 standard drinks       Review of Systems   Constitutional: Negative for chills and fever.   Gastrointestinal: Positive for rectal pain. Negative for constipation and nausea.        Hemorrhoid       Vitals:    07/03/22 1511   BP: 94/63   BP Location: Left arm   Patient Position: Sitting   Cuff Size: Adult Regular   Pulse: 75   Resp: 16   Temp: 98.4  F (36.9  C)   TempSrc: Oral   SpO2: 100%   Weight: 57.5 kg (126 lb 12.8 oz)       Physical Exam  Constitutional:       Appearance: Normal appearance. She is not ill-appearing or diaphoretic.   Cardiovascular:      Rate and Rhythm: Normal rate and regular rhythm.      Pulses: Normal pulses.      Heart sounds: Normal heart sounds.   Pulmonary:      Effort: Pulmonary effort is normal.      Breath sounds: Normal breath sounds.   Genitourinary:     Comments: Rectal exam revealing 1 external hemorrhoid, that is erythemic and mildly inflamed at 8:00 marker with tenderness to superficial touch.  No fissures or open areas.    Skin:     Capillary Refill: Capillary refill takes  less than 2 seconds.   Neurological:      Mental Status: She is alert and oriented to person, place, and time.   Psychiatric:         Behavior: Behavior normal.         Labs:  No results found for any visits on 07/03/22.      At the end of the encounter, I discussed results, diagnosis, medications. Discussed red flags for immediate return to clinic/ER, as well as indications for follow up if no improvement. Patient understood and agreed to plan.     ELLEN Olivera CNP

## 2022-07-05 ENCOUNTER — OFFICE VISIT (OUTPATIENT)
Dept: SURGERY | Facility: CLINIC | Age: 42
End: 2022-07-05
Payer: COMMERCIAL

## 2022-07-05 VITALS
HEART RATE: 88 BPM | OXYGEN SATURATION: 99 % | SYSTOLIC BLOOD PRESSURE: 100 MMHG | DIASTOLIC BLOOD PRESSURE: 54 MMHG | BODY MASS INDEX: 23.63 KG/M2 | HEIGHT: 62 IN | WEIGHT: 128.4 LBS

## 2022-07-05 DIAGNOSIS — K64.5 THROMBOSED EXTERNAL HEMORRHOIDS: Primary | ICD-10-CM

## 2022-07-05 PROCEDURE — 99202 OFFICE O/P NEW SF 15 MIN: CPT | Performed by: NURSE PRACTITIONER

## 2022-07-05 ASSESSMENT — PAIN SCALES - GENERAL: PAINLEVEL: WORST PAIN (10)

## 2022-07-05 NOTE — NURSING NOTE
"Chief Complaint   Patient presents with     New Patient     Thrombosed hemorrhoid       Vitals:    07/05/22 1421   BP: 100/54   BP Location: Left arm   Patient Position: Sitting   Cuff Size: Adult Regular   Pulse: 88   SpO2: 99%   Weight: 128 lb 6.4 oz   Height: 5' 1.5\"       Body mass index is 23.87 kg/m .                          Dary Solitario, EMT    "

## 2022-07-05 NOTE — LETTER
July 5, 2022    RE:  Liam Ellis                              [address] [facility] [address]      To Whom It May Concern:    This letter is written to document that Liam Ellis is under the medical care of ALMA Smith of the Division of Colon and Rectal Surgery at the [facility] outpatient clinics.    Liam hopes to return to work on 7/11/22. Please allow her to take this time off.     Please take the above information into consideration when determining Liam's future work schedule. If there are questions or concerns regarding the plan of care, please contact the Colon and Rectal Surgery Clinic at [phone].    Sincerely,    ALMA Smith    Division of Colon and Rectal Surgery  Department of Surgery       back pain general

## 2022-07-05 NOTE — TELEPHONE ENCOUNTER
It sounds like it may? Be thrombosed. Typically we refer these women to colon/rectal surgery.  If you call them, they can often get them in same day or next day.    Hydrocortisone ointment TID in addition to the lidocaine as well.  Thanks    MELIDA ANDINO MD

## 2022-07-05 NOTE — LETTER
"2022       RE: Liam Ellis   [address]  [place] [address]     Dear Colleague,    Thank you for referring your patient, Liam Ellis, to the [facility]ERY [facility] [place] [facility]. Please see a copy of my visit note below.    Colon and Rectal Surgery Consult Clinic Note    Date: 2022     Referring provider:  No referring provider defined for this encounter.     RE: Liam Ellis  : 1980  CHIDI: 2022    Liam Ellis is a very pleasant 42 year old female who presents today for thrombosed external hemorrhoid.    HPI:  Liam developed a painful external hemorrhoid on Saturday after working.  This got progressively more painful.  She has tried to manually reduce it and this does improve the pain somewhat but it does not stay in.  It has bled only a small amount.  She has having trouble sleeping and sitting due to the pain and has not been able to work again.  No fevers or chills.  She has never had anything like this in the past.  She does have some intermittent constipation.  She is currently 23 weeks pregnant.    Physical Examination:  /54 (BP Location: Left arm, Patient Position: Sitting, Cuff Size: Adult Regular)   Pulse 88   Ht 5' 1.5\"   Wt 128 lb 6.4 oz   SpO2 99%   BMI 23.87 kg/m    General: alert, oriented, in no acute distress, sitting comfortably  HEENT: mucous membranes moist    Perianal external examination: Exam was chaperoned by JENNY Santoyo   Small thrombosed external hemorrhoid in the left anterior position without necrosis or bleeding.    Digital rectal examination: Was deferred    Anoscopy: Was deferred    Assessment/Plan: 42 year old female who is currently 23 weeks pregnant with a small thrombosed external hemorrhoid.  We discussed intervention for this including excision versus conservative management.  Since she is pregnant, we did discuss the very small risk of inducing " labor with any procedure.  She preferred to proceed with conservative management.  Discussed using topical lidocaine, taking warm tub baths, taking Tylenol for pain.  Reassured her that this should improve in the next few days but may take a few weeks to resolve.  I be happy to see her anytime in the future with any worsening symptoms, questions, or concerns. Patient's questions were answered to her stated satisfaction and she is in agreement with this plan.    Medical history:  Past Medical History:   Diagnosis Date     Cervical high risk HPV (human papillomavirus) test positive ,     See problem list     Closed displaced fracture of base of metacarpal bone 2011     TMJ (temporomandibular joint syndrome) 12/10/2013       Surgical history:  Past Surgical History:   Procedure Laterality Date     APPENDECTOMY        SECTION N/A 2017    Procedure:  SECTION;;  Surgeon: Tracy Bowser MD;  [facility]     HC COLP CERVIX/UPPER VAGINA W BX CERVIX       HC INSERTION INTRAUTERINE DEVICE      Mirena     HC REMOVE INTRAUTERINE DEVICE         Problem list:  Patient Active Problem List    Diagnosis Date Noted     Need for Tdap vaccination 2022     Priority: Medium     Cervical high risk HPV (human papillomavirus) test positive      Priority: Medium     /18 NIL, +HR HPV, not 16/18. Plan 1 yr co-test  19 NIL, +HR HPV, not 16/18. Plan [place]  10/4/19 [place] bx: Chronic cervicitis with reactive squamous metaplasia, negative for dysplasia. Pap: NIL. Plan: cotest in 1 yr, due 10/4/20  9/21/20 My Chart Reminder Sent--Not Read  10/19/20 [facility] clinic and schedule.  20 Patient is lost to pap tracking follow-up. FYI routed to provider.  22 NIL, Neg HPV. Plan 3 yr co-test       TMJ (temporomandibular joint syndrome) 12/10/2013     Priority: Medium     CARDIOVASCULAR SCREENING; LDL GOAL LESS THAN 130 2011     Priority: Medium       Medications:  Current Outpatient Medications  "  Medication Sig Dispense Refill     aspirin (ASA) 81 MG chewable tablet Take 81 mg by mouth daily       Prenatal Vit-Fe Fumarate-FA (PRENATAL COMPLETE PO)        hydrocortisone (ANUSOL-HC) 25 MG suppository Place 1 suppository (25 mg) rectally 2 times daily as needed for hemorrhoids (Patient not taking: Reported on 7/5/2022) 24 suppository 0       Allergies:  No Known Allergies    Family history:  Family History   Problem Relation Age of Onset     Hypertension Mother      Diabetes Mother      Hypothyroidism Father      Glaucoma No family hx of      Macular Degeneration No family hx of        Social history:  Social History     Tobacco Use     Smoking status: Never Smoker     Smokeless tobacco: Never Used   Substance Use Topics     Alcohol use: No     Alcohol/week: 0.0 standard drinks    Marital status: single.    Nursing Notes:   Dary Solitario, EMT  7/5/2022  2:23 PM  Signed  Chief Complaint   Patient presents with     New Patient     Thrombosed hemorrhoid       Vitals:    07/05/22 1421   BP: 100/54   BP Location: Left arm   Patient Position: Sitting   Cuff Size: Adult Regular   Pulse: 88   SpO2: 99%   Weight: 128 lb 6.4 oz   Height: 5' 1.5\"       Body mass index is 23.87 kg/m .      Dary Solitario, EMT      15 minutes spent on the date of encounter (excluding time performing procedures) performing chart review, history and exam, documentation and further activities as noted above      ELLEN Hinton, NP-C  Colon and Rectal Surgery   [facility]      This note was created using speech recognition software and may contain unintended word substitutions.  "

## 2022-07-05 NOTE — PROGRESS NOTES
"Colon and Rectal Surgery Consult Clinic Note    Date: 2022     Referring provider:  No referring provider defined for this encounter.     RE: Liam Ellis  : 1980  CHIDI: 2022    Liam Ellis is a very pleasant 42 year old female who presents today for thrombosed external hemorrhoid.    HPI:  Liam developed a painful external hemorrhoid on Saturday after working.  This got progressively more painful.  She has tried to manually reduce it and this does improve the pain somewhat but it does not stay in.  It has bled only a small amount.  She has having trouble sleeping and sitting due to the pain and has not been able to work again.  No fevers or chills.  She has never had anything like this in the past.  She does have some intermittent constipation.  She is currently 23 weeks pregnant.    Physical Examination:  /54 (BP Location: Left arm, Patient Position: Sitting, Cuff Size: Adult Regular)   Pulse 88   Ht 5' 1.5\"   Wt 128 lb 6.4 oz   SpO2 99%   BMI 23.87 kg/m    General: alert, oriented, in no acute distress, sitting comfortably  HEENT: mucous membranes moist    Perianal external examination: Exam was chaperoned by JENNY Santoyo   Small thrombosed external hemorrhoid in the left anterior position without necrosis or bleeding.    Digital rectal examination: Was deferred    Anoscopy: Was deferred    Assessment/Plan: 42 year old female who is currently 23 weeks pregnant with a small thrombosed external hemorrhoid.  We discussed intervention for this including excision versus conservative management.  Since she is pregnant, we did discuss the very small risk of inducing labor with any procedure.  She preferred to proceed with conservative management.  Discussed using topical lidocaine, taking warm tub baths, taking Tylenol for pain.  Reassured her that this should improve in the next few days but may take a few weeks to resolve.  I be happy to see her anytime in the future with any " worsening symptoms, questions, or concerns. Patient's questions were answered to her stated satisfaction and she is in agreement with this plan.    Medical history:  Past Medical History:   Diagnosis Date     Cervical high risk HPV (human papillomavirus) test positive ,     See problem list     Closed displaced fracture of base of metacarpal bone 2011     TMJ (temporomandibular joint syndrome) 12/10/2013       Surgical history:  Past Surgical History:   Procedure Laterality Date     APPENDECTOMY        SECTION N/A 2017    Procedure:  SECTION;;  Surgeon: Tracy Bowser MD;  [facility]     HC COLP CERVIX/UPPER VAGINA W BX CERVIX       HC INSERTION INTRAUTERINE DEVICE      Mirena     HC REMOVE INTRAUTERINE DEVICE         Problem list:  Patient Active Problem List    Diagnosis Date Noted     Need for Tdap vaccination 2022     Priority: Medium     Cervical high risk HPV (human papillomavirus) test positive      Priority: Medium     6/8/18 NIL, +HR HPV, not 16/18. Plan 1 yr co-test  19 NIL, +HR HPV, not 16/18. Plan [place]  10/4/19 [place] bx: Chronic cervicitis with reactive squamous metaplasia, negative for dysplasia. Pap: NIL. Plan: cotest in 1 yr, due 10/4/20  9/21/20 My Chart Reminder Sent--Not Read  10/19/20 [facility] clinic and schedule.  20 Patient is lost to pap tracking follow-up. FYI routed to provider.  22 NIL, Neg HPV. Plan 3 yr co-test       TMJ (temporomandibular joint syndrome) 12/10/2013     Priority: Medium     CARDIOVASCULAR SCREENING; LDL GOAL LESS THAN 130 2011     Priority: Medium       Medications:  Current Outpatient Medications   Medication Sig Dispense Refill     aspirin (ASA) 81 MG chewable tablet Take 81 mg by mouth daily       Prenatal Vit-Fe Fumarate-FA (PRENATAL COMPLETE PO)        hydrocortisone (ANUSOL-HC) 25 MG suppository Place 1 suppository (25 mg) rectally 2 times daily as needed for hemorrhoids (Patient not taking: Reported on  "7/5/2022) 24 suppository 0       Allergies:  No Known Allergies    Family history:  Family History   Problem Relation Age of Onset     Hypertension Mother      Diabetes Mother      Hypothyroidism Father      Glaucoma No family hx of      Macular Degeneration No family hx of        Social history:  Social History     Tobacco Use     Smoking status: Never Smoker     Smokeless tobacco: Never Used   Substance Use Topics     Alcohol use: No     Alcohol/week: 0.0 standard drinks    Marital status: single.    Nursing Notes:   Dary Solitario, EMT  7/5/2022  2:23 PM  Signed  Chief Complaint   Patient presents with     New Patient     Thrombosed hemorrhoid       Vitals:    07/05/22 1421   BP: 100/54   BP Location: Left arm   Patient Position: Sitting   Cuff Size: Adult Regular   Pulse: 88   SpO2: 99%   Weight: 128 lb 6.4 oz   Height: 5' 1.5\"       Body mass index is 23.87 kg/m .                          Dary Solitario, LINDA         15 minutes spent on the date of encounter (excluding time performing procedures) performing chart review, history and exam, documentation and further activities as noted above    ELLEN Hinton, NP-C  Colon and Rectal Surgery   [facility]    This note was created using speech recognition software and may contain unintended word substitutions.    "

## 2022-07-07 NOTE — PROGRESS NOTES
Pediatric Otolaryngology and Facial Plastic Surgery    CC:   Chief Complaints and History of Present Illnesses   Patient presents with     Ent Problem     Cleft referral       Referring Provider: Alejandro:  Date of Service: 2022      Dear Dr. Allen,    I had the pleasure of meeting Liam Ellis in consultation today at your request in the [facility].    HPI:  Liam is a 42 year old female who presents with a chief complaint of who presents today for a prenatal evaluation regarding cleft lip and palate.  No known clefting in the family.  IVF with donor egg.  Here today discuss medical surgical management.      PMH:  Past Medical History:   Diagnosis Date     Cervical high risk HPV (human papillomavirus) test positive ,     See problem list     Closed displaced fracture of base of metacarpal bone 2011     TMJ (temporomandibular joint syndrome) 12/10/2013        PSH:  Past Surgical History:   Procedure Laterality Date     APPENDECTOMY        SECTION N/A 2017    Procedure:  SECTION;;  Surgeon: Tracy Bowser MD;  [facility]     HC COLP CERVIX/UPPER VAGINA W BX CERVIX       HC INSERTION INTRAUTERINE DEVICE      Mirena     HC REMOVE INTRAUTERINE DEVICE         Medications:    Current Outpatient Medications   Medication Sig Dispense Refill     aspirin (ASA) 81 MG chewable tablet Take 81 mg by mouth daily       Prenatal Vit-Fe Fumarate-FA (PRENATAL COMPLETE PO)        hydrocortisone (ANUSOL-HC) 25 MG suppository Place 1 suppository (25 mg) rectally 2 times daily as needed for hemorrhoids (Patient not taking: Reported on 2022) 24 suppository 0       Allergies:   No Known Allergies    Social History:  No smoke exposure   Social History     Socioeconomic History     Marital status: Single     Spouse name: partner- Jampal     Number of children: 1     Years of education: Not on file     Highest education level: Not on  file   Occupational History     Occupation: nursing asst     Occupation: RN student   Tobacco Use     Smoking status: Never Smoker     Smokeless tobacco: Never Used   Vaping Use     Vaping Use: Never used   Substance and Sexual Activity     Alcohol use: No     Alcohol/week: 0.0 standard drinks     Drug use: No     Sexual activity: Yes     Partners: Male     Birth control/protection: None   Other Topics Concern     Parent/sibling w/ CABG, MI or angioplasty before 65F 55M? No   Social History Narrative     Not on file     Social Determinants of Health     Financial Resource Strain: Not on file   Food Insecurity: Not on file   Transportation Needs: Not on file   Physical Activity: Not on file   Stress: Not on file   Social Connections: Not on file   Intimate Partner Violence: Not on file   Housing Stability: Not on file       FAMILY HISTORY:   No bleeding/Clotting disorders       Family History   Problem Relation Age of Onset     Hypertension Mother      Diabetes Mother      Hypothyroidism Father      Glaucoma No family hx of      Macular Degeneration No family hx of        REVIEW OF SYSTEMS:  12 point ROS obtained and was negative other than the symptoms noted above in the HPI.    PHYSICAL EXAMINATION:  Temp (!) 96.6  F (35.9  C) (Temporal)   Deferred secondary to prenatal nature of visit.    Imaging reviewed: US 6/3/2022  There is a large left side cleft in the lip and palate the extends posteriorly and compromises the entire hard palate. Otherwise normal fetal anatomy on detailed review.    Impressions and Recommendations:  Liam is a 42 year old female with We had a long detailed discussion regarding cleft embryology, cleft care, cleft surgery, expected postoperative course and outcomes.  We discussed associated issues including dental, hearing and speech.  We discussed feeding and weight gain.  Reviewed in detail pre-and postoperative photographs including surgical planning.  At this point I would like her to  contact our office once baby is born.  At that point will have her be seen with baby at approximately 2-3 weeks.  We will plan for lip repair at approximately 2 and half months.      Thank you for allowing me to participate in the care of Liam. Please don't hesitate to contact me.    Ramakrishna Douglass MD  Pediatric Otolaryngology and Facial Plastic Surgery  Department of Otolaryngology  [facility]   Clinic [phone]   Pager [phone]   [email or web address]      I spent a total of 45 minutes face-to-face or coordinating care of Liam.  Over 50% of my time on the unit was spent counseling the patient and /or coordinating care regarding cleft lip/palate.

## 2022-07-11 ENCOUNTER — LAB (OUTPATIENT)
Dept: LAB | Facility: CLINIC | Age: 42
End: 2022-07-11
Payer: COMMERCIAL

## 2022-07-11 DIAGNOSIS — R73.09 IMPAIRED GLUCOSE TOLERANCE TEST: ICD-10-CM

## 2022-07-11 LAB
GESTATIONAL GTT 1 HR POST DOSE: 170 MG/DL (ref 60–179)
GESTATIONAL GTT 3 HR POST DOSE: 133 MG/DL (ref 60–139)

## 2022-07-11 PROCEDURE — 82952 GTT-ADDED SAMPLES: CPT

## 2022-07-11 PROCEDURE — 36415 COLL VENOUS BLD VENIPUNCTURE: CPT

## 2022-07-11 PROCEDURE — 82951 GLUCOSE TOLERANCE TEST (GTT): CPT

## 2022-07-12 LAB
GESTATIONAL GTT 2 HR POST DOSE: 161 MG/DL (ref 60–154)
GLUCOSE P FAST SERPL-MCNC: 79 MG/DL (ref 60–94)

## 2022-07-19 ENCOUNTER — LAB (OUTPATIENT)
Dept: URGENT CARE | Facility: URGENT CARE | Age: 42
End: 2022-07-19

## 2022-07-19 ENCOUNTER — OFFICE VISIT (OUTPATIENT)
Dept: CARDIOLOGY | Facility: CLINIC | Age: 42
End: 2022-07-19
Payer: COMMERCIAL

## 2022-07-19 ENCOUNTER — HOSPITAL ENCOUNTER (OUTPATIENT)
Dept: CARDIOLOGY | Facility: CLINIC | Age: 42
Discharge: HOME | End: 2022-07-19
Attending: OBSTETRICS & GYNECOLOGY | Admitting: OBSTETRICS & GYNECOLOGY
Payer: COMMERCIAL

## 2022-07-19 DIAGNOSIS — Z20.822 SUSPECTED COVID-19 VIRUS INFECTION: ICD-10-CM

## 2022-07-19 DIAGNOSIS — O35.BXX0 FETAL CARDIAC DISEASE AFFECTING PREGNANCY, SINGLE OR UNSPECIFIED FETUS: Primary | ICD-10-CM

## 2022-07-19 PROCEDURE — 99203 OFFICE O/P NEW LOW 30 MIN: CPT | Mod: 25 | Performed by: PEDIATRICS

## 2022-07-19 PROCEDURE — U0005 INFEC AGEN DETEC AMPLI PROBE: HCPCS

## 2022-07-19 PROCEDURE — 76827 ECHO EXAM OF FETAL HEART: CPT | Mod: 26 | Performed by: PEDIATRICS

## 2022-07-19 PROCEDURE — U0003 INFECTIOUS AGENT DETECTION BY NUCLEIC ACID (DNA OR RNA); SEVERE ACUTE RESPIRATORY SYNDROME CORONAVIRUS 2 (SARS-COV-2) (CORONAVIRUS DISEASE [COVID-19]), AMPLIFIED PROBE TECHNIQUE, MAKING USE OF HIGH THROUGHPUT TECHNOLOGIES AS DESCRIBED BY CMS-2020-01-R: HCPCS

## 2022-07-19 PROCEDURE — 93325 DOPPLER ECHO COLOR FLOW MAPG: CPT

## 2022-07-19 PROCEDURE — 93325 DOPPLER ECHO COLOR FLOW MAPG: CPT | Mod: 26 | Performed by: PEDIATRICS

## 2022-07-19 PROCEDURE — 76825 ECHO EXAM OF FETAL HEART: CPT | Mod: 26 | Performed by: PEDIATRICS

## 2022-07-19 PROCEDURE — 99207 PR NO CHARGE LOS: CPT

## 2022-07-19 NOTE — PROGRESS NOTES
[facility]   Heart Center Fetal Consult Note    Patient:  Liam Ellis MRN:  [id number]   YOB: 1980 Age:  42 year old   Date of Visit:  2022 PCP:  Ana Rosa Harvey APRN CNP     Dear Doctor,     I had the pleasure of seeing Lima Ellis at the [facility] on 2022 in fetal cardiology consultation for fetal echocardiogram results. She presented today accompanied by her boyfriend. As you know, she is a 42 year old  at 25w5d who presented for fetal echocardiogram today because of IVF pregnancy and cleft upper lip.    I performed and interpreted the fetal echocardiogram today, which demonstrated normal fetal cardiac anatomy. Normal fetal intracardiac connections. Normal right and left ventricular size and function. No hydrops.    I reviewed the echo findings today with Liam Ellis and her partner. She is aware that the study was within normal limits with no major cardiac abnormalities. She is aware of the general limitations of fetal echocardiography. No additional fetal echocardiograms are recommended. No  cardiac follow-up is required.     Thank you for allowing me to participate in Liam's care. Please do not hesitate to contact me with questions or concerns.    This visit was separate from the performance and interpretation of the ultrasound. The majority of the time (>50%) was spent in counseling and coordination of care. I spent approximately 20 minutes in face-to-face time reviewing the above considerations.    Janet Stanley M.D.  Pediatric Cardiology  [address], 5th floor, [place] [address]  Phone [phone]  Fax [phone]

## 2022-07-20 LAB — SARS-COV-2 RNA RESP QL NAA+PROBE: NEGATIVE

## 2022-07-22 ENCOUNTER — ANCILLARY PROCEDURE (OUTPATIENT)
Dept: ULTRASOUND IMAGING | Facility: HOSPITAL | Age: 42
End: 2022-07-22
Attending: OBSTETRICS & GYNECOLOGY
Payer: COMMERCIAL

## 2022-07-22 ENCOUNTER — OFFICE VISIT (OUTPATIENT)
Dept: MATERNAL FETAL MEDICINE | Facility: HOSPITAL | Age: 42
End: 2022-07-22
Attending: OBSTETRICS & GYNECOLOGY
Payer: COMMERCIAL

## 2022-07-22 DIAGNOSIS — O35.AXX0 FETAL CLEFT LIP AND PALATE AFFECTING MANAGEMENT OF MOTHER IN SINGLETON PREGNANCY, ANTEPARTUM: Primary | ICD-10-CM

## 2022-07-22 DIAGNOSIS — O35.AXX0 FETUS WITH SUSPECTED CLEFT LIP AND PALATE, ANTEPARTUM, SINGLE OR UNSPECIFIED FETUS: ICD-10-CM

## 2022-07-22 PROCEDURE — 99207 PR NO CHARGE LOS: CPT | Performed by: OBSTETRICS & GYNECOLOGY

## 2022-07-22 PROCEDURE — 76816 OB US FOLLOW-UP PER FETUS: CPT | Mod: 26 | Performed by: OBSTETRICS & GYNECOLOGY

## 2022-07-22 PROCEDURE — 76816 OB US FOLLOW-UP PER FETUS: CPT

## 2022-07-22 NOTE — PROGRESS NOTES
Please see full imaging report from View[employer] program under imaging tab.    Preet Vega MD  Maternal Fetal Medicine

## 2022-08-08 ENCOUNTER — PRENATAL OFFICE VISIT (OUTPATIENT)
Dept: OBGYN | Facility: CLINIC | Age: 42
End: 2022-08-08
Payer: COMMERCIAL

## 2022-08-08 VITALS
WEIGHT: 135.1 LBS | SYSTOLIC BLOOD PRESSURE: 97 MMHG | BODY MASS INDEX: 25.11 KG/M2 | HEART RATE: 84 BPM | DIASTOLIC BLOOD PRESSURE: 67 MMHG | OXYGEN SATURATION: 98 %

## 2022-08-08 DIAGNOSIS — O09.529 ENCOUNTER FOR SUPERVISION OF MULTIGRAVIDA WITH ADVANCED MATERNAL AGE: ICD-10-CM

## 2022-08-08 DIAGNOSIS — O34.219 PREVIOUS CESAREAN DELIVERY, ANTEPARTUM CONDITION OR COMPLICATION: Primary | ICD-10-CM

## 2022-08-08 DIAGNOSIS — Z23 NEED FOR TDAP VACCINATION: ICD-10-CM

## 2022-08-08 LAB
ERYTHROCYTE [DISTWIDTH] IN BLOOD BY AUTOMATED COUNT: 14 % (ref 10–15)
HCT VFR BLD AUTO: 32.5 % (ref 35–47)
HGB BLD-MCNC: 10.6 G/DL (ref 11.7–15.7)
HGB BLD-MCNC: 10.6 G/DL (ref 11.7–15.7)
HOLD SPECIMEN: NORMAL
MCH RBC QN AUTO: 31.3 PG (ref 26.5–33)
MCHC RBC AUTO-ENTMCNC: 32.6 G/DL (ref 31.5–36.5)
MCV RBC AUTO: 96 FL (ref 78–100)
PLATELET # BLD AUTO: 352 10E3/UL (ref 150–450)
RBC # BLD AUTO: 3.39 10E6/UL (ref 3.8–5.2)
WBC # BLD AUTO: 7.7 10E3/UL (ref 4–11)

## 2022-08-08 PROCEDURE — 83550 IRON BINDING TEST: CPT | Performed by: OBSTETRICS & GYNECOLOGY

## 2022-08-08 PROCEDURE — 90715 TDAP VACCINE 7 YRS/> IM: CPT | Performed by: OBSTETRICS & GYNECOLOGY

## 2022-08-08 PROCEDURE — 85027 COMPLETE CBC AUTOMATED: CPT | Performed by: OBSTETRICS & GYNECOLOGY

## 2022-08-08 PROCEDURE — 82728 ASSAY OF FERRITIN: CPT | Performed by: OBSTETRICS & GYNECOLOGY

## 2022-08-08 PROCEDURE — 99207 PR PRENATAL VISIT: CPT | Performed by: OBSTETRICS & GYNECOLOGY

## 2022-08-08 PROCEDURE — 90471 IMMUNIZATION ADMIN: CPT | Performed by: OBSTETRICS & GYNECOLOGY

## 2022-08-08 PROCEDURE — 36415 COLL VENOUS BLD VENIPUNCTURE: CPT | Performed by: OBSTETRICS & GYNECOLOGY

## 2022-08-08 NOTE — PROGRESS NOTES
28w4d overall feeling good, no c/o.  Good fm.  Passed 3hr GTT with one abnormal.  Needs hgb check today.    Risks and benefits of trial of labor after  section versus elective repeat  section were explained. The discussion included risk 1% of uterine rupture with a 0.5% chance of subsequent risk of fetal death or brain damage or significant maternal morbidity or death. I also explained risks of bleeding, infection, and potential damage to other organs associated with a repeat . If a trial of labor is chosen, we discussed the need for continuous monitoring and IV access in active labor.  We also discussed the contraindications to induction and the need  to have repeat  if she got to her EDC (age 42).  All questions were answered.  Given the risk of TOLAC, she prefers to schedule a repeat c/s.  She declines BTL at the time of surgery.    RTC 4 weeks  [email or web address]

## 2022-08-09 ENCOUNTER — TELEPHONE (OUTPATIENT)
Dept: OBGYN | Facility: CLINIC | Age: 42
End: 2022-08-09

## 2022-08-09 LAB
IRON SATN MFR SERPL: 20 % (ref 15–46)
IRON SERPL-MCNC: 70 UG/DL (ref 35–180)
TIBC SERPL-MCNC: 349 UG/DL (ref 240–430)

## 2022-08-09 NOTE — TELEPHONE ENCOUNTER
LVMTCB to discuss c/s scheduling.     Erin  She/her/hers  [facility] Scheduler

## 2022-08-09 NOTE — TELEPHONE ENCOUNTER
Type of surgery: ob  Location of surgery: [facility] [place] OR  Date and time of surgery: 10/26/22 12:30PM  Surgeon: Donavon  Pre-Op Appt Date: surgeon  Post-Op Appt Date: patient will wait to schedule   Packet sent out: Yes  Pre-cert/Authorization completed:  Not Applicable  Date: 08/09/22     ALMITA Khan  She/her/hers  [place] [facility] Scheduler

## 2022-08-10 LAB — FERRITIN SERPL-MCNC: 26 NG/ML (ref 12–150)

## 2022-08-23 ENCOUNTER — DOCUMENTATION ONLY (OUTPATIENT)
Dept: MATERNAL FETAL MEDICINE | Facility: CLINIC | Age: 42
End: 2022-08-23

## 2022-08-23 ENCOUNTER — TELEPHONE (OUTPATIENT)
Dept: MATERNAL FETAL MEDICINE | Facility: CLINIC | Age: 42
End: 2022-08-23

## 2022-08-23 NOTE — TELEPHONE ENCOUNTER
2022    Called Liam to notify her of the option for  genetic testing/eval for the CL&P impacting the current pregnancy. Earlier in pregnancy Liam was interesting in pursuing amniocentesis to determine possible genetic etiology for the CL&P, but declined due to concerns surrounding miscarriage risks (1 in 500 chance). Today, I reviewed that I discussed her case with our genetics team and they offered to meet with her and her baby prior to the CL&P repair for possible genetic testing and evaluation. Today, Liam declined this option, stating that she is no longer interested in obtaining this information. Peds scheduling has been notified of this change.      Marcy Macario MS, [facility]  Genetic Counselor  [facility]  Maternal Fetal Medicine  Ph: [phone]   Pager: [phone]

## 2022-08-23 NOTE — PROGRESS NOTES
August 23, 2022    Reviewed on Pediatric Genetics call. Recommending outpatient consult with Dr. Solomon prior to repair.     Ping Royal MS, [facility]  Licensed Genetic Counselor  [facility]  Maternal Fetal Medicine  [email or web address]  [phone]

## 2022-09-02 ENCOUNTER — ANCILLARY PROCEDURE (OUTPATIENT)
Dept: ULTRASOUND IMAGING | Facility: HOSPITAL | Age: 42
End: 2022-09-02
Attending: OBSTETRICS & GYNECOLOGY
Payer: COMMERCIAL

## 2022-09-02 ENCOUNTER — OFFICE VISIT (OUTPATIENT)
Dept: MATERNAL FETAL MEDICINE | Facility: HOSPITAL | Age: 42
End: 2022-09-02
Attending: OBSTETRICS & GYNECOLOGY
Payer: COMMERCIAL

## 2022-09-02 DIAGNOSIS — O35.AXX0 FETAL CLEFT LIP AND PALATE AFFECTING MANAGEMENT OF MOTHER IN SINGLETON PREGNANCY, ANTEPARTUM: Primary | ICD-10-CM

## 2022-09-02 DIAGNOSIS — O09.523 AMA (ADVANCED MATERNAL AGE) MULTIGRAVIDA 35+, THIRD TRIMESTER: ICD-10-CM

## 2022-09-02 PROCEDURE — 76816 OB US FOLLOW-UP PER FETUS: CPT

## 2022-09-02 PROCEDURE — 99207 PR NO CHARGE LOS: CPT | Performed by: OBSTETRICS & GYNECOLOGY

## 2022-09-02 PROCEDURE — 76816 OB US FOLLOW-UP PER FETUS: CPT | Mod: 26 | Performed by: OBSTETRICS & GYNECOLOGY

## 2022-09-02 NOTE — PROGRESS NOTES
"Please see \"Imaging\" tab under \"Chart Review\" for details of today's visit.    Sheryl Sewell    "

## 2022-09-19 ENCOUNTER — PRENATAL OFFICE VISIT (OUTPATIENT)
Dept: OBGYN | Facility: CLINIC | Age: 42
End: 2022-09-19
Payer: COMMERCIAL

## 2022-09-19 ENCOUNTER — TELEPHONE (OUTPATIENT)
Dept: OBGYN | Facility: CLINIC | Age: 42
End: 2022-09-19

## 2022-09-19 ENCOUNTER — NURSE TRIAGE (OUTPATIENT)
Dept: NURSING | Facility: CLINIC | Age: 42
End: 2022-09-19

## 2022-09-19 VITALS
WEIGHT: 141 LBS | SYSTOLIC BLOOD PRESSURE: 109 MMHG | OXYGEN SATURATION: 98 % | HEART RATE: 90 BPM | DIASTOLIC BLOOD PRESSURE: 73 MMHG | BODY MASS INDEX: 26.21 KG/M2

## 2022-09-19 DIAGNOSIS — Z23 HIGH PRIORITY FOR 2019-NCOV VACCINE: ICD-10-CM

## 2022-09-19 DIAGNOSIS — D50.8 OTHER IRON DEFICIENCY ANEMIA: ICD-10-CM

## 2022-09-19 DIAGNOSIS — Z23 NEED FOR PROPHYLACTIC VACCINATION AND INOCULATION AGAINST INFLUENZA: ICD-10-CM

## 2022-09-19 DIAGNOSIS — O46.93 VAGINAL BLEEDING IN PREGNANCY, THIRD TRIMESTER: Primary | ICD-10-CM

## 2022-09-19 DIAGNOSIS — O34.219 PREVIOUS CESAREAN DELIVERY, ANTEPARTUM CONDITION OR COMPLICATION: Primary | ICD-10-CM

## 2022-09-19 LAB
ERYTHROCYTE [DISTWIDTH] IN BLOOD BY AUTOMATED COUNT: 13.8 % (ref 10–15)
HCT VFR BLD AUTO: 33.3 % (ref 35–47)
HGB BLD-MCNC: 10.9 G/DL (ref 11.7–15.7)
HOLD SPECIMEN: NORMAL
MCH RBC QN AUTO: 31.1 PG (ref 26.5–33)
MCHC RBC AUTO-ENTMCNC: 32.7 G/DL (ref 31.5–36.5)
MCV RBC AUTO: 95 FL (ref 78–100)
PLATELET # BLD AUTO: 297 10E3/UL (ref 150–450)
RBC # BLD AUTO: 3.51 10E6/UL (ref 3.8–5.2)
WBC # BLD AUTO: 9 10E3/UL (ref 4–11)

## 2022-09-19 PROCEDURE — 90471 IMMUNIZATION ADMIN: CPT | Performed by: OBSTETRICS & GYNECOLOGY

## 2022-09-19 PROCEDURE — 90686 IIV4 VACC NO PRSV 0.5 ML IM: CPT | Performed by: OBSTETRICS & GYNECOLOGY

## 2022-09-19 PROCEDURE — 0124A COVID-19,PF,PFIZER BOOSTER BIVALENT: CPT | Performed by: OBSTETRICS & GYNECOLOGY

## 2022-09-19 PROCEDURE — 99207 PR PRENATAL VISIT: CPT | Performed by: OBSTETRICS & GYNECOLOGY

## 2022-09-19 PROCEDURE — 36415 COLL VENOUS BLD VENIPUNCTURE: CPT | Performed by: OBSTETRICS & GYNECOLOGY

## 2022-09-19 PROCEDURE — 91312 COVID-19,PF,PFIZER BOOSTER BIVALENT: CPT | Performed by: OBSTETRICS & GYNECOLOGY

## 2022-09-19 PROCEDURE — 85027 COMPLETE CBC AUTOMATED: CPT | Performed by: OBSTETRICS & GYNECOLOGY

## 2022-09-19 NOTE — PROGRESS NOTES
34w4d feeling good, no c/o.  Good fm.  Has been taking iron for about a month now, will recheck CBC today.  GBS next visit.  C/s scheduled.  Flu and covid booster today.  RTC 1-2 weeks  veronika

## 2022-09-20 NOTE — TELEPHONE ENCOUNTER
OB Triage Call      Is patient's OB/Midwife with the [facility] or [facility]? [facility]- Proceed with triage     Reason for call: Pt reports small amount of bright red blood on tissue after she urinated. Patient states she wiped again just a few moments ago and the tissue was pink.     Assessment: Pt denies contractions and baby movement is normal. Pt denies abdominal pain, feeling shocky, mild/moderate/or severe vaginal bleeding. She is not having any leakage of fluid from her vagina.     Plan: paged on-call MD. Continue to monitor symptoms at home for now.     Patient plans to deliver at [facility]    Patient's primary OB Provider is THUY Raphael.      Per protocol recommendations Consult needed for [facility] MD or Midwife.  Patient's primary OB is [facility] Physician.  On-Call provider Sonia Ventura paged at 10:02PM. Call returned at 10:04PM and advised on triage assessment.  Provider recommendations: Home Care recommendations.   Since patient is not having cramping or pain, and baby movement is normal, continue to monitor at home. The doctor will have a nurse follow up with patient tomorrow.     Provider Recommendation Follow Up:   Reached patient/caregiver. Informed of provider's recommendations. Patient verbalized understanding and agrees with the plan.         34w4d    Estimated Date of Delivery: Oct 27, 2022        OB History    Para Term  AB Living   2 1 1 0 0 1   SAB IAB Ectopic Multiple Live Births   0 0 0 0 1      # Outcome Date GA Lbr David/2nd Weight Sex Delivery Anes PTL Lv   2 Current            1 Term 17 39w2d 20:10 / 04:00 3.005 kg (6 lb 10 oz) M CS-LTranv EPI N TACOS      Birth Comments: hysterotomy extension into right broad ligament- repaired      Complications: Failure to Progress in Second Stage, Gestational diabetes mellitus (GDM), antepartum      Name: Noe      Apgar1: 5  Apgar5: 8       Lab Results   Component Value Date    GBS  2017     Negative  No GBS  DNA detected, presumed negative for GBS or number of bacteria may be   below the limit of detection of the assay.   Assay performed on incubated broth culture of specimen using [employer] real-time   PCR.            Amairani Topete RN 22 10:12 PM  [facility] Nurse Advisor    Reason for Disposition    [1] Pregnant 24-36 weeks () AND [2] pinkish or brownish mucous discharge    Additional Information    Negative: Passed out (i.e., lost consciousness, collapsed and was not responding)    Negative: Shock suspected (e.g., cold/pale/clammy skin, too weak to stand, low BP, rapid pulse)    Negative: Difficult to awaken or acting confused (e.g., disoriented, slurred speech)    Negative: SEVERE vaginal bleeding (e.g., continuous red blood from vagina, large blood clots)    Negative: [1] SEVERE abdominal pain (e.g., excruciating) AND [2] constant AND [3] present > 1 hour    Negative: Sounds like a life-threatening emergency to the triager    Negative: [1] Vaginal bleeding AND [2] pregnant < 20 weeks    Negative: MILD-MODERATE vaginal bleeding (e.g., small to medium clots; like mild menstrual period) (Exception: Single episode of faint spotting when wiping, or slight spotting after intercourse or pelvic exam)    Negative: Abdominal pain or having contractions    Negative: Leakage of fluid from vagina (or caller thinks she has ruptured her bag of chavarria)    Negative: Baby moving less today (e.g., kick count < 5 in 1 hour or < 10 in 2 hours)    Protocols used: PREGNANCY - VAGINAL BLEEDING GREATER THAN 20 WEEKS [facility]

## 2022-09-20 NOTE — TELEPHONE ENCOUNTER
TC prema Wheeler to discuss her sxs. Notes she saw light spotting this AM when wiping but only once. RN reassured this can be normal. She will continue to monitor her symptoms.   Lulu Weir RN

## 2022-09-20 NOTE — TELEPHONE ENCOUNTER
Liam Ellis is a 42 year old  at 34w4d who called FNA tonight with red, then pink with wiping. No contractions or pain. Advised FNA that patient could monitor at home if no other sx. Can you call her  to see how she is doing?  Thanks  Sonia Ventura MD

## 2022-09-30 ENCOUNTER — OFFICE VISIT (OUTPATIENT)
Dept: MATERNAL FETAL MEDICINE | Facility: HOSPITAL | Age: 42
End: 2022-09-30
Attending: OBSTETRICS & GYNECOLOGY
Payer: COMMERCIAL

## 2022-09-30 ENCOUNTER — ANCILLARY PROCEDURE (OUTPATIENT)
Dept: ULTRASOUND IMAGING | Facility: HOSPITAL | Age: 42
End: 2022-09-30
Attending: OBSTETRICS & GYNECOLOGY
Payer: COMMERCIAL

## 2022-09-30 DIAGNOSIS — O35.AXX0 FETAL CLEFT LIP AND PALATE AFFECTING ANTEPARTUM CARE OF MOTHER, SINGLE OR UNSPECIFIED FETUS: Primary | ICD-10-CM

## 2022-09-30 DIAGNOSIS — O35.AXX0 FETAL CLEFT LIP AND PALATE AFFECTING MANAGEMENT OF MOTHER IN SINGLETON PREGNANCY, ANTEPARTUM: ICD-10-CM

## 2022-09-30 PROCEDURE — 76819 FETAL BIOPHYS PROFIL W/O NST: CPT | Mod: 26 | Performed by: OBSTETRICS & GYNECOLOGY

## 2022-09-30 PROCEDURE — 76819 FETAL BIOPHYS PROFIL W/O NST: CPT

## 2022-09-30 PROCEDURE — 76816 OB US FOLLOW-UP PER FETUS: CPT | Mod: 26 | Performed by: OBSTETRICS & GYNECOLOGY

## 2022-09-30 PROCEDURE — 76816 OB US FOLLOW-UP PER FETUS: CPT

## 2022-09-30 PROCEDURE — 99207 PR NO CHARGE LOS: CPT | Performed by: OBSTETRICS & GYNECOLOGY

## 2022-09-30 NOTE — PROGRESS NOTES
Please see the imaging tab for details of the ultrasound performed today.    Magalie Greene MD  Specialist in Maternal-Fetal Medicine

## 2022-10-05 ENCOUNTER — PRENATAL OFFICE VISIT (OUTPATIENT)
Dept: OBGYN | Facility: CLINIC | Age: 42
End: 2022-10-05
Payer: COMMERCIAL

## 2022-10-05 VITALS
OXYGEN SATURATION: 97 % | HEART RATE: 73 BPM | WEIGHT: 146 LBS | BODY MASS INDEX: 27.14 KG/M2 | DIASTOLIC BLOOD PRESSURE: 63 MMHG | SYSTOLIC BLOOD PRESSURE: 96 MMHG

## 2022-10-05 DIAGNOSIS — O34.219 HISTORY OF CESAREAN DELIVERY AFFECTING PREGNANCY: ICD-10-CM

## 2022-10-05 DIAGNOSIS — O09.529 ENCOUNTER FOR SUPERVISION OF MULTIGRAVIDA WITH ADVANCED MATERNAL AGE: Primary | ICD-10-CM

## 2022-10-05 LAB
HGB BLD-MCNC: 11.2 G/DL (ref 11.7–15.7)
HOLD SPECIMEN: NORMAL

## 2022-10-05 PROCEDURE — 99207 PR PRENATAL VISIT: CPT | Performed by: OBSTETRICS & GYNECOLOGY

## 2022-10-05 PROCEDURE — 87653 STREP B DNA AMP PROBE: CPT | Performed by: OBSTETRICS & GYNECOLOGY

## 2022-10-05 PROCEDURE — 36415 COLL VENOUS BLD VENIPUNCTURE: CPT | Performed by: OBSTETRICS & GYNECOLOGY

## 2022-10-05 ASSESSMENT — PATIENT HEALTH QUESTIONNAIRE - PHQ9: SUM OF ALL RESPONSES TO PHQ QUESTIONS 1-9: 6

## 2022-10-05 NOTE — PROGRESS NOTES
36w6d  Active fetal movement. No leaking or bleeding. Some cramping but no contractions.    Reviewed results from growth US 9/30: EFW 2675g (30%), AC 41%, breech, normal fluid, cleft upper lip: left unilateral. Palate: cleft: on the left side. fetal colon at the splenic flexure again appears prominent, but appears stable when compared to previous ultrasound. 8/8 BPP    BSUS today: breech  GBS collected  Hgb 11.2    Discussed that her baby is breech. Patient planning repeat CS. Discussed that if her water breaks or contractions start she should not eat anything and come to the hospital.   Requests rx for breast pump, written.    RTC 1 week, sooner PRN.  Sonia Ventura MD

## 2022-10-06 LAB — GP B STREP DNA SPEC QL NAA+PROBE: NEGATIVE

## 2022-10-07 ENCOUNTER — ANCILLARY PROCEDURE (OUTPATIENT)
Dept: ULTRASOUND IMAGING | Facility: HOSPITAL | Age: 42
End: 2022-10-07
Attending: OBSTETRICS & GYNECOLOGY
Payer: COMMERCIAL

## 2022-10-07 ENCOUNTER — OFFICE VISIT (OUTPATIENT)
Dept: MATERNAL FETAL MEDICINE | Facility: HOSPITAL | Age: 42
End: 2022-10-07
Attending: OBSTETRICS & GYNECOLOGY
Payer: COMMERCIAL

## 2022-10-07 DIAGNOSIS — O35.AXX0 FETAL CLEFT LIP AND PALATE AFFECTING MANAGEMENT OF MOTHER IN SINGLETON PREGNANCY, ANTEPARTUM: ICD-10-CM

## 2022-10-07 DIAGNOSIS — O09.523 AMA (ADVANCED MATERNAL AGE) MULTIGRAVIDA 35+, THIRD TRIMESTER: ICD-10-CM

## 2022-10-07 DIAGNOSIS — O35.AXX0 FETAL CLEFT LIP AND PALATE AFFECTING ANTEPARTUM CARE OF MOTHER, SINGLE OR UNSPECIFIED FETUS: Primary | ICD-10-CM

## 2022-10-07 PROCEDURE — 76819 FETAL BIOPHYS PROFIL W/O NST: CPT

## 2022-10-07 PROCEDURE — 99207 PR NO CHARGE LOS: CPT | Performed by: OBSTETRICS & GYNECOLOGY

## 2022-10-07 PROCEDURE — 76819 FETAL BIOPHYS PROFIL W/O NST: CPT | Mod: 26 | Performed by: OBSTETRICS & GYNECOLOGY

## 2022-10-07 NOTE — PROGRESS NOTES
"Please see \"Imaging\" tab under Chart Review for full details.    Amairani Coon MD  Maternal Fetal Medicine    "

## 2022-10-11 ENCOUNTER — PRENATAL OFFICE VISIT (OUTPATIENT)
Dept: OBGYN | Facility: CLINIC | Age: 42
End: 2022-10-11
Payer: COMMERCIAL

## 2022-10-11 VITALS
SYSTOLIC BLOOD PRESSURE: 120 MMHG | WEIGHT: 146.4 LBS | OXYGEN SATURATION: 98 % | BODY MASS INDEX: 27.21 KG/M2 | HEART RATE: 110 BPM | DIASTOLIC BLOOD PRESSURE: 74 MMHG

## 2022-10-11 DIAGNOSIS — O34.219 PREVIOUS CESAREAN DELIVERY, ANTEPARTUM CONDITION OR COMPLICATION: Primary | ICD-10-CM

## 2022-10-11 PROCEDURE — 99207 PR PRENATAL VISIT: CPT | Performed by: OBSTETRICS & GYNECOLOGY

## 2022-10-11 NOTE — PROGRESS NOTES
37w5d overall feeling ok, no c/o.  Good fm.  Has been breech on us, has weekly with M.  Discussed will check day of surgery as well, but does not change mgmt.  GBS negative.  C/s scheduled 10/26.  RTC weekly  veronika

## 2022-10-14 ENCOUNTER — ANCILLARY PROCEDURE (OUTPATIENT)
Dept: ULTRASOUND IMAGING | Facility: HOSPITAL | Age: 42
End: 2022-10-14
Attending: STUDENT IN AN ORGANIZED HEALTH CARE EDUCATION/TRAINING PROGRAM
Payer: COMMERCIAL

## 2022-10-14 ENCOUNTER — OFFICE VISIT (OUTPATIENT)
Dept: MATERNAL FETAL MEDICINE | Facility: HOSPITAL | Age: 42
End: 2022-10-14
Attending: STUDENT IN AN ORGANIZED HEALTH CARE EDUCATION/TRAINING PROGRAM
Payer: COMMERCIAL

## 2022-10-14 DIAGNOSIS — O09.523 AMA (ADVANCED MATERNAL AGE) MULTIGRAVIDA 35+, THIRD TRIMESTER: Primary | ICD-10-CM

## 2022-10-14 DIAGNOSIS — O35.AXX0 FETAL CLEFT LIP AND PALATE AFFECTING MANAGEMENT OF MOTHER IN SINGLETON PREGNANCY, ANTEPARTUM: ICD-10-CM

## 2022-10-14 PROCEDURE — 76819 FETAL BIOPHYS PROFIL W/O NST: CPT

## 2022-10-14 PROCEDURE — 99207 PR NO CHARGE LOS: CPT | Performed by: OBSTETRICS & GYNECOLOGY

## 2022-10-14 PROCEDURE — 76819 FETAL BIOPHYS PROFIL W/O NST: CPT | Mod: 26 | Performed by: OBSTETRICS & GYNECOLOGY

## 2022-10-14 NOTE — PROGRESS NOTES
"Please see \"Imaging\" tab under \"Chart Review\" for details of today's US at the [facility].    Chemo Marshall MD  Maternal-Fetal Medicine      "

## 2022-10-19 DIAGNOSIS — O34.219 PREVIOUS CESAREAN DELIVERY, ANTEPARTUM CONDITION OR COMPLICATION: ICD-10-CM

## 2022-10-19 DIAGNOSIS — Z01.818 PRE-OP EXAM: Primary | ICD-10-CM

## 2022-10-21 ENCOUNTER — MEDICAL CORRESPONDENCE (OUTPATIENT)
Dept: HEALTH INFORMATION MANAGEMENT | Facility: CLINIC | Age: 42
End: 2022-10-21

## 2022-10-21 ENCOUNTER — OFFICE VISIT (OUTPATIENT)
Dept: MATERNAL FETAL MEDICINE | Facility: HOSPITAL | Age: 42
End: 2022-10-21
Attending: OBSTETRICS & GYNECOLOGY
Payer: COMMERCIAL

## 2022-10-21 ENCOUNTER — ANCILLARY PROCEDURE (OUTPATIENT)
Dept: ULTRASOUND IMAGING | Facility: HOSPITAL | Age: 42
End: 2022-10-21
Attending: OBSTETRICS & GYNECOLOGY
Payer: COMMERCIAL

## 2022-10-21 DIAGNOSIS — O09.523 AMA (ADVANCED MATERNAL AGE) MULTIGRAVIDA 35+, THIRD TRIMESTER: Primary | ICD-10-CM

## 2022-10-21 DIAGNOSIS — O35.AXX0 FETAL CLEFT LIP AND PALATE AFFECTING MANAGEMENT OF MOTHER IN SINGLETON PREGNANCY, ANTEPARTUM: ICD-10-CM

## 2022-10-21 PROCEDURE — 76819 FETAL BIOPHYS PROFIL W/O NST: CPT | Mod: 26 | Performed by: OBSTETRICS & GYNECOLOGY

## 2022-10-21 PROCEDURE — 99207 PR NO CHARGE LOS: CPT | Performed by: OBSTETRICS & GYNECOLOGY

## 2022-10-21 PROCEDURE — 76819 FETAL BIOPHYS PROFIL W/O NST: CPT

## 2022-10-23 LAB
ABO/RH(D): NORMAL
ANTIBODY SCREEN: NEGATIVE
SPECIMEN EXPIRATION DATE: NORMAL

## 2022-10-24 ENCOUNTER — LAB (OUTPATIENT)
Dept: LAB | Facility: CLINIC | Age: 42
End: 2022-10-24
Payer: COMMERCIAL

## 2022-10-24 ENCOUNTER — HOSPITAL ENCOUNTER (OUTPATIENT)
Dept: ULTRASOUND IMAGING | Facility: CLINIC | Age: 42
Discharge: HOME | End: 2022-10-24
Attending: OBSTETRICS & GYNECOLOGY
Payer: COMMERCIAL

## 2022-10-24 ENCOUNTER — OFFICE VISIT (OUTPATIENT)
Dept: MATERNAL FETAL MEDICINE | Facility: CLINIC | Age: 42
End: 2022-10-24
Attending: OBSTETRICS & GYNECOLOGY
Payer: COMMERCIAL

## 2022-10-24 DIAGNOSIS — O34.219 PREVIOUS CESAREAN DELIVERY, ANTEPARTUM CONDITION OR COMPLICATION: ICD-10-CM

## 2022-10-24 DIAGNOSIS — Z01.818 PRE-OP EXAM: ICD-10-CM

## 2022-10-24 DIAGNOSIS — O09.523 AMA (ADVANCED MATERNAL AGE) MULTIGRAVIDA 35+, THIRD TRIMESTER: Primary | ICD-10-CM

## 2022-10-24 DIAGNOSIS — O35.AXX0 FETAL CLEFT LIP AND PALATE AFFECTING MANAGEMENT OF MOTHER IN SINGLETON PREGNANCY, ANTEPARTUM: ICD-10-CM

## 2022-10-24 LAB
ERYTHROCYTE [DISTWIDTH] IN BLOOD BY AUTOMATED COUNT: 13.9 % (ref 10–15)
HCT VFR BLD AUTO: 38.9 % (ref 35–47)
HGB BLD-MCNC: 12.9 G/DL (ref 11.7–15.7)
MCH RBC QN AUTO: 31.2 PG (ref 26.5–33)
MCHC RBC AUTO-ENTMCNC: 33.2 G/DL (ref 31.5–36.5)
MCV RBC AUTO: 94 FL (ref 78–100)
PLATELET # BLD AUTO: 280 10E3/UL (ref 150–450)
RBC # BLD AUTO: 4.13 10E6/UL (ref 3.8–5.2)
SARS-COV-2 RNA RESP QL NAA+PROBE: NEGATIVE
WBC # BLD AUTO: 7.9 10E3/UL (ref 4–11)

## 2022-10-24 PROCEDURE — U0003 INFECTIOUS AGENT DETECTION BY NUCLEIC ACID (DNA OR RNA); SEVERE ACUTE RESPIRATORY SYNDROME CORONAVIRUS 2 (SARS-COV-2) (CORONAVIRUS DISEASE [COVID-19]), AMPLIFIED PROBE TECHNIQUE, MAKING USE OF HIGH THROUGHPUT TECHNOLOGIES AS DESCRIBED BY CMS-2020-01-R: HCPCS

## 2022-10-24 PROCEDURE — 86900 BLOOD TYPING SEROLOGIC ABO: CPT

## 2022-10-24 PROCEDURE — 86780 TREPONEMA PALLIDUM: CPT

## 2022-10-24 PROCEDURE — 86901 BLOOD TYPING SEROLOGIC RH(D): CPT

## 2022-10-24 PROCEDURE — 86850 RBC ANTIBODY SCREEN: CPT

## 2022-10-24 PROCEDURE — 85027 COMPLETE CBC AUTOMATED: CPT

## 2022-10-24 PROCEDURE — 76819 FETAL BIOPHYS PROFIL W/O NST: CPT | Mod: 26 | Performed by: OBSTETRICS & GYNECOLOGY

## 2022-10-24 PROCEDURE — U0005 INFEC AGEN DETEC AMPLI PROBE: HCPCS

## 2022-10-24 PROCEDURE — 36415 COLL VENOUS BLD VENIPUNCTURE: CPT

## 2022-10-24 PROCEDURE — 76819 FETAL BIOPHYS PROFIL W/O NST: CPT

## 2022-10-25 ENCOUNTER — ANESTHESIA EVENT (OUTPATIENT)
Dept: OBGYN | Facility: CLINIC | Age: 42
End: 2022-10-25
Payer: COMMERCIAL

## 2022-10-25 LAB — T PALLIDUM AB SER QL: NONREACTIVE

## 2022-10-25 NOTE — ANESTHESIA PREPROCEDURE EVALUATION
Anesthesia Pre-Procedure Evaluation    Patient: Liam Ellis   MRN: [id number] : 1980        Procedure : Procedure(s):   SECTION          Past Medical History:   Diagnosis Date     Cervical high risk HPV (human papillomavirus) test positive ,     See problem list     Closed displaced fracture of base of metacarpal bone 2011     TMJ (temporomandibular joint syndrome) 12/10/2013      Past Surgical History:   Procedure Laterality Date     APPENDECTOMY        SECTION N/A 2017    Procedure:  SECTION;;  Surgeon: Tracy Bowser MD;  Location: [facility] L+D     HC COLP CERVIX/UPPER VAGINA W BX CERVIX       HC INSERTION INTRAUTERINE DEVICE      Mirena     HC REMOVE INTRAUTERINE DEVICE        No Known Allergies   Social History     Tobacco Use     Smoking status: Never     Smokeless tobacco: Never   Substance Use Topics     Alcohol use: No     Alcohol/week: 0.0 standard drinks      Wt Readings from Last 1 Encounters:   10/11/22 66.4 kg (146 lb 6.4 oz)        Anesthesia Evaluation   Pt has had prior anesthetic. Type: OB Labor Epidural.        ROS/MED HX  ENT/Pulmonary:  - neg pulmonary ROS     Neurologic:  - neg neurologic ROS     Cardiovascular:  - neg cardiovascular ROS     METS/Exercise Tolerance:     Hematologic:  - neg hematologic  ROS     Musculoskeletal:       GI/Hepatic:  - neg GI/hepatic ROS     Renal/Genitourinary:       Endo:  - neg endo ROS     Psychiatric/Substance Use:  - neg psychiatric ROS     Infectious Disease:       Malignancy:       Other:      (+) , previous ,            OUTSIDE LABS:  CBC:   Lab Results   Component Value Date    WBC 7.9 10/24/2022    WBC 9.0 2022    HGB 12.9 10/24/2022    HGB 11.2 (L) 10/05/2022    HCT 38.9 10/24/2022    HCT 33.3 (L) 2022     10/24/2022     2022     BMP:   Lab Results   Component Value Date     2019    POTASSIUM 3.8 2019    CHLORIDE 107 2019    CO2 26 2019     BUN 10 06/21/2019    CR 0.58 06/21/2019    GLC 86 05/11/2021    GLC 75 06/21/2019     COAGS: No results found for: PTT, INR, FIBR  POC:   Lab Results   Component Value Date     (H) 05/16/2017    HCG Negative 02/03/2020     HEPATIC:   Lab Results   Component Value Date    ALBUMIN 4.0 06/21/2019    PROTTOTAL 8.2 06/21/2019    ALT 19 06/21/2019    AST 15 06/21/2019    ALKPHOS 63 06/21/2019    BILITOTAL 0.4 06/21/2019     OTHER:   Lab Results   Component Value Date    A1C 5.2 04/26/2022    SANYA 8.6 06/21/2019    TSH 3.80 01/27/2020    T4 0.74 (L) 06/21/2019       Anesthesia Plan    ASA Status:  2   NPO Status:  NPO Appropriate    Anesthesia Type: Spinal.              Consents            Postoperative Care    Pain management: IV analgesics, Oral pain medications, Peripheral nerve block (Single Shot), Multi-modal analgesia.   PONV prophylaxis: Ondansetron (or other 5HT-3)     Comments:           neg OB ROS.       Rene Muhammad MD

## 2022-10-26 ENCOUNTER — HOSPITAL ENCOUNTER (INPATIENT)
Facility: CLINIC | Age: 42
LOS: 3 days | Discharge: HOME HEALTH | End: 2022-10-29
Attending: OBSTETRICS & GYNECOLOGY | Admitting: OBSTETRICS & GYNECOLOGY
Payer: COMMERCIAL

## 2022-10-26 ENCOUNTER — ANESTHESIA (OUTPATIENT)
Dept: OBGYN | Facility: CLINIC | Age: 42
End: 2022-10-26
Payer: COMMERCIAL

## 2022-10-26 DIAGNOSIS — Z98.891 S/P REPEAT LOW TRANSVERSE C-SECTION: Primary | ICD-10-CM

## 2022-10-26 PROCEDURE — C9290 INJ, BUPIVACAINE LIPOSOME: HCPCS | Performed by: ANESTHESIOLOGY

## 2022-10-26 PROCEDURE — 360N000076 HC SURGERY LEVEL 3, PER MIN: Performed by: OBSTETRICS & GYNECOLOGY

## 2022-10-26 PROCEDURE — 258N000003 HC RX IP 258 OP 636

## 2022-10-26 PROCEDURE — 271N000001 HC OR GENERAL SUPPLY NON-STERILE: Performed by: OBSTETRICS & GYNECOLOGY

## 2022-10-26 PROCEDURE — 59510 CESAREAN DELIVERY: CPT | Mod: GC | Performed by: OBSTETRICS & GYNECOLOGY

## 2022-10-26 PROCEDURE — 250N000013 HC RX MED GY IP 250 OP 250 PS 637: Performed by: OBSTETRICS & GYNECOLOGY

## 2022-10-26 PROCEDURE — 272N000001 HC OR GENERAL SUPPLY STERILE: Performed by: OBSTETRICS & GYNECOLOGY

## 2022-10-26 PROCEDURE — 250N000013 HC RX MED GY IP 250 OP 250 PS 637

## 2022-10-26 PROCEDURE — 250N000011 HC RX IP 250 OP 636

## 2022-10-26 PROCEDURE — 120N000002 HC R&B MED SURG/OB UMMC

## 2022-10-26 PROCEDURE — 999N000016 HC STATISTIC ATTENDANCE AT DELIVERY

## 2022-10-26 PROCEDURE — 370N000017 HC ANESTHESIA TECHNICAL FEE, PER MIN: Performed by: OBSTETRICS & GYNECOLOGY

## 2022-10-26 PROCEDURE — 999N000141 HC STATISTIC PRE-PROCEDURE NURSING ASSESSMENT: Performed by: OBSTETRICS & GYNECOLOGY

## 2022-10-26 PROCEDURE — 258N000003 HC RX IP 258 OP 636: Performed by: OBSTETRICS & GYNECOLOGY

## 2022-10-26 PROCEDURE — 710N000010 HC RECOVERY PHASE 1, LEVEL 2, PER MIN: Performed by: OBSTETRICS & GYNECOLOGY

## 2022-10-26 PROCEDURE — 250N000009 HC RX 250: Performed by: OBSTETRICS & GYNECOLOGY

## 2022-10-26 PROCEDURE — 250N000011 HC RX IP 250 OP 636: Performed by: OBSTETRICS & GYNECOLOGY

## 2022-10-26 PROCEDURE — 250N000011 HC RX IP 250 OP 636: Performed by: ANESTHESIOLOGY

## 2022-10-26 RX ORDER — CARBOPROST TROMETHAMINE 250 UG/ML
250 INJECTION, SOLUTION INTRAMUSCULAR
Status: DISCONTINUED | OUTPATIENT
Start: 2022-10-26 | End: 2022-10-26 | Stop reason: HOSPADM

## 2022-10-26 RX ORDER — OXYTOCIN/0.9 % SODIUM CHLORIDE 30/500 ML
340 PLASTIC BAG, INJECTION (ML) INTRAVENOUS CONTINUOUS PRN
Status: DISCONTINUED | OUTPATIENT
Start: 2022-10-26 | End: 2022-10-26 | Stop reason: HOSPADM

## 2022-10-26 RX ORDER — KETOROLAC TROMETHAMINE 30 MG/ML
30 INJECTION, SOLUTION INTRAMUSCULAR; INTRAVENOUS EVERY 6 HOURS
Status: COMPLETED | OUTPATIENT
Start: 2022-10-26 | End: 2022-10-27

## 2022-10-26 RX ORDER — BUPIVACAINE HYDROCHLORIDE 2.5 MG/ML
INJECTION, SOLUTION EPIDURAL; INFILTRATION; INTRACAUDAL
Status: COMPLETED | OUTPATIENT
Start: 2022-10-26 | End: 2022-10-26

## 2022-10-26 RX ORDER — HYDROCORTISONE 25 MG/G
CREAM TOPICAL 3 TIMES DAILY PRN
Status: DISCONTINUED | OUTPATIENT
Start: 2022-10-26 | End: 2022-10-29 | Stop reason: HOSPADM

## 2022-10-26 RX ORDER — LIDOCAINE 40 MG/G
CREAM TOPICAL
Status: DISCONTINUED | OUTPATIENT
Start: 2022-10-26 | End: 2022-10-26 | Stop reason: HOSPADM

## 2022-10-26 RX ORDER — CEFAZOLIN SODIUM/WATER 2 G/20 ML
2 SYRINGE (ML) INTRAVENOUS
Status: COMPLETED | OUTPATIENT
Start: 2022-10-26 | End: 2022-10-26

## 2022-10-26 RX ORDER — TRANEXAMIC ACID 10 MG/ML
1 INJECTION, SOLUTION INTRAVENOUS EVERY 30 MIN PRN
Status: DISCONTINUED | OUTPATIENT
Start: 2022-10-26 | End: 2022-10-26 | Stop reason: HOSPADM

## 2022-10-26 RX ORDER — ACETAMINOPHEN 325 MG/1
975 TABLET ORAL ONCE
Status: COMPLETED | OUTPATIENT
Start: 2022-10-26 | End: 2022-10-26

## 2022-10-26 RX ORDER — MORPHINE SULFATE 1 MG/ML
INJECTION, SOLUTION EPIDURAL; INTRATHECAL; INTRAVENOUS
Status: COMPLETED | OUTPATIENT
Start: 2022-10-26 | End: 2022-10-26

## 2022-10-26 RX ORDER — FENTANYL CITRATE 50 UG/ML
25 INJECTION, SOLUTION INTRAMUSCULAR; INTRAVENOUS EVERY 5 MIN PRN
Status: DISCONTINUED | OUTPATIENT
Start: 2022-10-26 | End: 2022-10-26 | Stop reason: HOSPADM

## 2022-10-26 RX ORDER — CITRIC ACID/SODIUM CITRATE 334-500MG
30 SOLUTION, ORAL ORAL
Status: COMPLETED | OUTPATIENT
Start: 2022-10-26 | End: 2022-10-26

## 2022-10-26 RX ORDER — FENTANYL CITRATE 50 UG/ML
INJECTION, SOLUTION INTRAMUSCULAR; INTRAVENOUS
Status: COMPLETED | OUTPATIENT
Start: 2022-10-26 | End: 2022-10-26

## 2022-10-26 RX ORDER — BUPIVACAINE HYDROCHLORIDE 7.5 MG/ML
INJECTION, SOLUTION INTRASPINAL
Status: COMPLETED | OUTPATIENT
Start: 2022-10-26 | End: 2022-10-26

## 2022-10-26 RX ORDER — MISOPROSTOL 200 UG/1
400 TABLET ORAL
Status: DISCONTINUED | OUTPATIENT
Start: 2022-10-26 | End: 2022-10-26 | Stop reason: HOSPADM

## 2022-10-26 RX ORDER — METHYLERGONOVINE MALEATE 0.2 MG/ML
200 INJECTION INTRAVENOUS
Status: DISCONTINUED | OUTPATIENT
Start: 2022-10-26 | End: 2022-10-26 | Stop reason: HOSPADM

## 2022-10-26 RX ORDER — OXYCODONE HYDROCHLORIDE 5 MG/1
5 TABLET ORAL EVERY 4 HOURS PRN
Status: DISCONTINUED | OUTPATIENT
Start: 2022-10-26 | End: 2022-10-29 | Stop reason: HOSPADM

## 2022-10-26 RX ORDER — ONDANSETRON 2 MG/ML
4 INJECTION INTRAMUSCULAR; INTRAVENOUS EVERY 30 MIN PRN
Status: DISCONTINUED | OUTPATIENT
Start: 2022-10-26 | End: 2022-10-26 | Stop reason: HOSPADM

## 2022-10-26 RX ORDER — SODIUM CHLORIDE, SODIUM LACTATE, POTASSIUM CHLORIDE, CALCIUM CHLORIDE 600; 310; 30; 20 MG/100ML; MG/100ML; MG/100ML; MG/100ML
INJECTION, SOLUTION INTRAVENOUS CONTINUOUS
Status: DISCONTINUED | OUTPATIENT
Start: 2022-10-26 | End: 2022-10-26 | Stop reason: HOSPADM

## 2022-10-26 RX ORDER — BISACODYL 10 MG
10 SUPPOSITORY, RECTAL RECTAL DAILY PRN
Status: DISCONTINUED | OUTPATIENT
Start: 2022-10-28 | End: 2022-10-29 | Stop reason: HOSPADM

## 2022-10-26 RX ORDER — ACETAMINOPHEN 325 MG/1
975 TABLET ORAL EVERY 6 HOURS
Status: DISCONTINUED | OUTPATIENT
Start: 2022-10-26 | End: 2022-10-29 | Stop reason: HOSPADM

## 2022-10-26 RX ORDER — OXYTOCIN 10 [USP'U]/ML
10 INJECTION, SOLUTION INTRAMUSCULAR; INTRAVENOUS
Status: DISCONTINUED | OUTPATIENT
Start: 2022-10-26 | End: 2022-10-29 | Stop reason: HOSPADM

## 2022-10-26 RX ORDER — OXYTOCIN 10 [USP'U]/ML
10 INJECTION, SOLUTION INTRAMUSCULAR; INTRAVENOUS
Status: DISCONTINUED | OUTPATIENT
Start: 2022-10-26 | End: 2022-10-26 | Stop reason: HOSPADM

## 2022-10-26 RX ORDER — MODIFIED LANOLIN
OINTMENT (GRAM) TOPICAL
Status: DISCONTINUED | OUTPATIENT
Start: 2022-10-26 | End: 2022-10-29 | Stop reason: HOSPADM

## 2022-10-26 RX ORDER — AMOXICILLIN 250 MG
1 CAPSULE ORAL 2 TIMES DAILY
Status: DISCONTINUED | OUTPATIENT
Start: 2022-10-26 | End: 2022-10-29 | Stop reason: HOSPADM

## 2022-10-26 RX ORDER — MISOPROSTOL 200 UG/1
800 TABLET ORAL
Status: DISCONTINUED | OUTPATIENT
Start: 2022-10-26 | End: 2022-10-29 | Stop reason: HOSPADM

## 2022-10-26 RX ORDER — SIMETHICONE 80 MG
80 TABLET,CHEWABLE ORAL 4 TIMES DAILY PRN
Status: DISCONTINUED | OUTPATIENT
Start: 2022-10-26 | End: 2022-10-29 | Stop reason: HOSPADM

## 2022-10-26 RX ORDER — ONDANSETRON 4 MG/1
4 TABLET, ORALLY DISINTEGRATING ORAL EVERY 30 MIN PRN
Status: DISCONTINUED | OUTPATIENT
Start: 2022-10-26 | End: 2022-10-26 | Stop reason: HOSPADM

## 2022-10-26 RX ORDER — SODIUM CHLORIDE, SODIUM LACTATE, POTASSIUM CHLORIDE, CALCIUM CHLORIDE 600; 310; 30; 20 MG/100ML; MG/100ML; MG/100ML; MG/100ML
INJECTION, SOLUTION INTRAVENOUS CONTINUOUS PRN
Status: DISCONTINUED | OUTPATIENT
Start: 2022-10-26 | End: 2022-10-26

## 2022-10-26 RX ORDER — DEXTROSE, SODIUM CHLORIDE, SODIUM LACTATE, POTASSIUM CHLORIDE, AND CALCIUM CHLORIDE 5; .6; .31; .03; .02 G/100ML; G/100ML; G/100ML; G/100ML; G/100ML
INJECTION, SOLUTION INTRAVENOUS CONTINUOUS
Status: DISCONTINUED | OUTPATIENT
Start: 2022-10-26 | End: 2022-10-29 | Stop reason: HOSPADM

## 2022-10-26 RX ORDER — TRANEXAMIC ACID 10 MG/ML
1 INJECTION, SOLUTION INTRAVENOUS EVERY 30 MIN PRN
Status: DISCONTINUED | OUTPATIENT
Start: 2022-10-26 | End: 2022-10-29 | Stop reason: HOSPADM

## 2022-10-26 RX ORDER — OXYCODONE HYDROCHLORIDE 5 MG/1
5 TABLET ORAL EVERY 4 HOURS PRN
Status: DISCONTINUED | OUTPATIENT
Start: 2022-10-26 | End: 2022-10-27

## 2022-10-26 RX ORDER — NALOXONE HYDROCHLORIDE 0.4 MG/ML
0.4 INJECTION, SOLUTION INTRAMUSCULAR; INTRAVENOUS; SUBCUTANEOUS
Status: DISCONTINUED | OUTPATIENT
Start: 2022-10-26 | End: 2022-10-29 | Stop reason: HOSPADM

## 2022-10-26 RX ORDER — ONDANSETRON 4 MG/1
4 TABLET, ORALLY DISINTEGRATING ORAL EVERY 6 HOURS PRN
Status: DISCONTINUED | OUTPATIENT
Start: 2022-10-26 | End: 2022-10-29 | Stop reason: HOSPADM

## 2022-10-26 RX ORDER — CARBOPROST TROMETHAMINE 250 UG/ML
250 INJECTION, SOLUTION INTRAMUSCULAR
Status: DISCONTINUED | OUTPATIENT
Start: 2022-10-26 | End: 2022-10-29 | Stop reason: HOSPADM

## 2022-10-26 RX ORDER — DIPHENHYDRAMINE HCL 25 MG
25 CAPSULE ORAL EVERY 6 HOURS PRN
Status: DISCONTINUED | OUTPATIENT
Start: 2022-10-26 | End: 2022-10-29 | Stop reason: HOSPADM

## 2022-10-26 RX ORDER — METOCLOPRAMIDE HYDROCHLORIDE 5 MG/ML
10 INJECTION INTRAMUSCULAR; INTRAVENOUS EVERY 6 HOURS PRN
Status: DISCONTINUED | OUTPATIENT
Start: 2022-10-26 | End: 2022-10-29 | Stop reason: HOSPADM

## 2022-10-26 RX ORDER — MISOPROSTOL 200 UG/1
400 TABLET ORAL
Status: DISCONTINUED | OUTPATIENT
Start: 2022-10-26 | End: 2022-10-29 | Stop reason: HOSPADM

## 2022-10-26 RX ORDER — METOCLOPRAMIDE 10 MG/1
10 TABLET ORAL EVERY 6 HOURS PRN
Status: DISCONTINUED | OUTPATIENT
Start: 2022-10-26 | End: 2022-10-29 | Stop reason: HOSPADM

## 2022-10-26 RX ORDER — IBUPROFEN 800 MG/1
800 TABLET, FILM COATED ORAL EVERY 6 HOURS
Status: DISCONTINUED | OUTPATIENT
Start: 2022-10-27 | End: 2022-10-27

## 2022-10-26 RX ORDER — ONDANSETRON 2 MG/ML
4 INJECTION INTRAMUSCULAR; INTRAVENOUS EVERY 6 HOURS PRN
Status: DISCONTINUED | OUTPATIENT
Start: 2022-10-26 | End: 2022-10-29 | Stop reason: HOSPADM

## 2022-10-26 RX ORDER — CEFAZOLIN SODIUM/WATER 2 G/20 ML
2 SYRINGE (ML) INTRAVENOUS SEE ADMIN INSTRUCTIONS
Status: DISCONTINUED | OUTPATIENT
Start: 2022-10-26 | End: 2022-10-26 | Stop reason: HOSPADM

## 2022-10-26 RX ORDER — NALOXONE HYDROCHLORIDE 0.4 MG/ML
0.2 INJECTION, SOLUTION INTRAMUSCULAR; INTRAVENOUS; SUBCUTANEOUS
Status: DISCONTINUED | OUTPATIENT
Start: 2022-10-26 | End: 2022-10-29 | Stop reason: HOSPADM

## 2022-10-26 RX ORDER — OXYTOCIN/0.9 % SODIUM CHLORIDE 30/500 ML
100-340 PLASTIC BAG, INJECTION (ML) INTRAVENOUS CONTINUOUS PRN
Status: DISCONTINUED | OUTPATIENT
Start: 2022-10-26 | End: 2022-10-26 | Stop reason: HOSPADM

## 2022-10-26 RX ORDER — LIDOCAINE 40 MG/G
CREAM TOPICAL
Status: DISCONTINUED | OUTPATIENT
Start: 2022-10-26 | End: 2022-10-26

## 2022-10-26 RX ORDER — PROCHLORPERAZINE MALEATE 10 MG
10 TABLET ORAL EVERY 6 HOURS PRN
Status: DISCONTINUED | OUTPATIENT
Start: 2022-10-26 | End: 2022-10-29 | Stop reason: HOSPADM

## 2022-10-26 RX ORDER — OXYTOCIN/0.9 % SODIUM CHLORIDE 30/500 ML
340 PLASTIC BAG, INJECTION (ML) INTRAVENOUS CONTINUOUS PRN
Status: DISCONTINUED | OUTPATIENT
Start: 2022-10-26 | End: 2022-10-29 | Stop reason: HOSPADM

## 2022-10-26 RX ORDER — MISOPROSTOL 200 UG/1
800 TABLET ORAL
Status: DISCONTINUED | OUTPATIENT
Start: 2022-10-26 | End: 2022-10-26 | Stop reason: HOSPADM

## 2022-10-26 RX ORDER — METHYLERGONOVINE MALEATE 0.2 MG/ML
200 INJECTION INTRAVENOUS
Status: DISCONTINUED | OUTPATIENT
Start: 2022-10-26 | End: 2022-10-29 | Stop reason: HOSPADM

## 2022-10-26 RX ORDER — PROCHLORPERAZINE 25 MG
25 SUPPOSITORY, RECTAL RECTAL EVERY 12 HOURS PRN
Status: DISCONTINUED | OUTPATIENT
Start: 2022-10-26 | End: 2022-10-29 | Stop reason: HOSPADM

## 2022-10-26 RX ORDER — AMOXICILLIN 250 MG
2 CAPSULE ORAL 2 TIMES DAILY
Status: DISCONTINUED | OUTPATIENT
Start: 2022-10-26 | End: 2022-10-29 | Stop reason: HOSPADM

## 2022-10-26 RX ADMIN — SODIUM CHLORIDE, POTASSIUM CHLORIDE, SODIUM LACTATE AND CALCIUM CHLORIDE: 600; 310; 30; 20 INJECTION, SOLUTION INTRAVENOUS at 12:03

## 2022-10-26 RX ADMIN — BUPIVACAINE HYDROCHLORIDE IN DEXTROSE 1.6 ML: 7.5 INJECTION, SOLUTION SUBARACHNOID at 12:08

## 2022-10-26 RX ADMIN — ACETAMINOPHEN 975 MG: 325 TABLET, FILM COATED ORAL at 18:31

## 2022-10-26 RX ADMIN — BUPIVACAINE 20 ML: 13.3 INJECTION, SUSPENSION, LIPOSOMAL INFILTRATION at 13:30

## 2022-10-26 RX ADMIN — ACETAMINOPHEN 975 MG: 325 TABLET, FILM COATED ORAL at 11:47

## 2022-10-26 RX ADMIN — OXYCODONE HYDROCHLORIDE 5 MG: 5 TABLET ORAL at 19:58

## 2022-10-26 RX ADMIN — SENNOSIDES AND DOCUSATE SODIUM 2 TABLET: 50; 8.6 TABLET ORAL at 19:58

## 2022-10-26 RX ADMIN — BUPIVACAINE HYDROCHLORIDE 20 ML: 2.5 INJECTION, SOLUTION EPIDURAL; INFILTRATION; INTRACAUDAL at 13:30

## 2022-10-26 RX ADMIN — Medication 2 G: at 12:10

## 2022-10-26 RX ADMIN — OXYTOCIN-SODIUM CHLORIDE 0.9% IV SOLN 30 UNIT/500ML 300 ML/HR: 30-0.9/5 SOLUTION at 12:39

## 2022-10-26 RX ADMIN — KETOROLAC TROMETHAMINE 30 MG: 30 INJECTION, SOLUTION INTRAMUSCULAR at 14:34

## 2022-10-26 RX ADMIN — FENTANYL CITRATE 15 MCG: 50 INJECTION, SOLUTION INTRAMUSCULAR; INTRAVENOUS at 12:08

## 2022-10-26 RX ADMIN — SODIUM CITRATE AND CITRIC ACID MONOHYDRATE 30 ML: 500; 334 SOLUTION ORAL at 12:04

## 2022-10-26 RX ADMIN — SODIUM CHLORIDE, POTASSIUM CHLORIDE, SODIUM LACTATE AND CALCIUM CHLORIDE 200 ML/HR: 600; 310; 30; 20 INJECTION, SOLUTION INTRAVENOUS at 11:30

## 2022-10-26 RX ADMIN — SODIUM CHLORIDE, POTASSIUM CHLORIDE, SODIUM LACTATE AND CALCIUM CHLORIDE: 600; 310; 30; 20 INJECTION, SOLUTION INTRAVENOUS at 13:31

## 2022-10-26 RX ADMIN — PHENYLEPHRINE HYDROCHLORIDE 50 MCG/MIN: 10 INJECTION INTRAVENOUS at 12:11

## 2022-10-26 RX ADMIN — TRANEXAMIC ACID 1 G: 10 INJECTION, SOLUTION INTRAVENOUS at 12:40

## 2022-10-26 RX ADMIN — KETOROLAC TROMETHAMINE 30 MG: 30 INJECTION, SOLUTION INTRAMUSCULAR at 20:35

## 2022-10-26 RX ADMIN — MORPHINE SULFATE 0.15 MG: 1 INJECTION EPIDURAL; INTRATHECAL; INTRAVENOUS at 12:08

## 2022-10-26 RX ADMIN — SIMETHICONE 80 MG: 80 TABLET, CHEWABLE ORAL at 19:58

## 2022-10-26 ASSESSMENT — ACTIVITIES OF DAILY LIVING (ADL)
ADLS_ACUITY_SCORE: 18

## 2022-10-26 NOTE — ANESTHESIA PROCEDURE NOTES
"Intrathecal injection Procedure Note    Pre-Procedure   Staff -        Anesthesiologist:  Luna Brooks MD       Resident/Fellow: Rasheed Kevin MD       Performed By: resident       Location: OR       Procedure Start/Stop Times: 10/26/2022 12:08 PM and 10/26/2022 12:12 PM       Pre-Anesthestic Checklist: patient identified, IV checked, risks and benefits discussed, informed consent, monitors and equipment checked, pre-op evaluation, at physician/surgeon's request and post-op pain management  Timeout:       Correct Patient: Yes        Correct Procedure: Yes        Correct Site: Yes        Correct Position: Yes   Procedure Documentation  Procedure: intrathecal injection       Patient Position: sitting       Skin prep: Chloraprep       Insertion Site: L3-4. (midline approach).       Needle Gauge: 25.        Needle Length (Inches): 3.5        Spinal Needle Type: Pencan       Introducer used       Introducer: 20 G       # of attempts: 1 and  # of redirects:  0    Assessment/Narrative         Paresthesias: No.       CSF fluid: clear.    Medication(s) Administered   0.75% Hyperbaric Bupivacaine (Intrathecal) - Intrathecal   1.6 mL - 10/26/2022 12:08:00 PM  Fentanyl PF (Intrathecal) - Intrathecal   15 mcg - 10/26/2022 12:08:00 PM  Morphine PF 1 mg/mL (Intrathecal) - Intrathecal   0.15 mg - 10/26/2022 12:08:00 PM  Medication Administration Time: 10/26/2022 12:08 PM      FOR [facility] ([facility]) ONLY:   Pain Team Contact information: please page the Pain Team Via [employer]. Search \"Pain\". During daytime hours, please page the attending first. At night please page the resident first.    "

## 2022-10-26 NOTE — H&P
History and Physical     Liam Ellis MRN# [id number]   YOB: 1980 Age: 42 year old      Date of Admission: (Not on file)    Primary care provider: Ana Rosa Harvey      Assessment and Plan:     42 year old  at 39w6d by IVF transfer date and 7w0d US, here for scheduled repeat  section.     # Scheduled Repeat  Section  # Breech presentation  Indicated due to Breech presentation. Prior (s) for failure to progress in second stage and maternal exhaustion. Previous op notes reported extension into broad ligament and left sided extension. Right mesosalpinx laceration into venous plexus. Will plan for a Pfannenstial skin incision with low transverse hysterotomy  - Labs: CBC, T&S, RPR   - Pre-op Hgb 12.9, Plts 280  - Placenta: posterior  - Anesthesia: Spinal  - 2g Ancef  - PPH Meds/Ppx: all available  - Diet: NPO  - PPx: SCDs  - Consent: Discussed risks and benefits of procedure, including but not limited to bleeding, infection, injury to surrounding organs, injury to infant, and the potential need for another surgery should some injury go unrecognized or patient were to have continued bleeding. Patient had time to ask questions and expressed understanding of procedure and associated risks. Agreed to blood transfusion if necessary. Consent signed.    # PNC  - Rh positive, Rubella immune, GTT passed with one abnormal, GBS negative  - Other prenatal labs wnl  - s/p covid, flu, Tdap vaccines  - Contraception: IVF pregnancy  - Feeding: Breast    # Cleft Lip and Palate  - anatomy scan: Isolated left sided cleft lip and palate which extends to the midline  - NICU for delivery  - Post partum follow up per pediatric team    # FWB:  Cat 1 tracing, reactive; Breech back on maternal right and head just maternal left of midline by BSUS; EFW 7lbs  - Continuous Fetal Monitoring  - NICU for delivery   - Intrauterine resuscitative measures prn      Patient discussed with Dr. Raphael               HPI:   Liam Ellis is a 42 year old  at 39w6d by LMP c/w 7w0d US who presents today for scheduled repeat  section.    Pregnancy notable for:   - History of previous  section  - AMA  - IVF pregnancy with donor egg  - Fetal cleft lip and palate  - Breech presenation    Her previous  delivery was notable for Arrest of descent and maternal exhaustion. Denies history of postpartum hemorrhage, shoulder dystocia, pre-eclampsia. No history of asthma or high blood pressure.    She reports good fetal movement. Denies LOF, vaginal bleeding, or contractions.  She denies fever, chills, SOB, chest pain, palpitations, N/V, LE swelling/tenderness.  No concerns for headache, vision changes, RUQ or epigastric pain.    Patient has been NPO since last evening    OB History:    OB History    Para Term  AB Living   2 1 1 0 0 1   SAB IAB Ectopic Multiple Live Births   0 0 0 0 1      # Outcome Date GA Lbr David/2nd Weight Sex Delivery Anes PTL Lv   2 Current            1 Term 17 39w2d 20:10 / 04:00 3.005 kg (6 lb 10 oz) M CS-LTranv EPI N TACOS      Birth Comments: hysterotomy extension into right broad ligament- repaired      Complications: Failure to Progress in Second Stage, Gestational diabetes mellitus (GDM), antepartum      Name: Noe      Apgar1: 5  Apgar5: 8        Prenatal Lab Results:  Lab Results   Component Value Date    ABO O 05/15/2017    RH  Pos 05/15/2017    AS Negative 10/24/2022    HEPBANG Nonreactive 2022    CHPCRT Negative 2018    GCPCRT Negative 2018    TREPAB Negative 2017    HGB 12.9 10/24/2022       GBS Status:   Lab Results   Component Value Date    GBS  2017     Negative  No GBS DNA detected, presumed negative for GBS or number of bacteria may be   below the limit of detection of the assay.   Assay performed on incubated broth culture of specimen using [employer] real-time   PCR.                  Past Medical History:      Past Medical History:   Diagnosis Date     Cervical high risk HPV (human papillomavirus) test positive ,     See problem list     Closed displaced fracture of base of metacarpal bone 2011     TMJ (temporomandibular joint syndrome) 12/10/2013             Past Surgical History:     Past Surgical History:   Procedure Laterality Date     APPENDECTOMY        SECTION N/A 2017    Procedure:  SECTION;;  Surgeon: Tracy Bowser MD;  Location: [facility] L[facility]     HC COLP CERVIX/UPPER VAGINA W BX CERVIX       HC INSERTION INTRAUTERINE DEVICE      Mirena     HC REMOVE INTRAUTERINE DEVICE               Social History:     Social History     Tobacco Use     Smoking status: Never     Smokeless tobacco: Never   Substance Use Topics     Alcohol use: No     Alcohol/week: 0.0 standard drinks             Family History:     Family History   Problem Relation Age of Onset     Hypertension Mother      Diabetes Mother      Hypothyroidism Father      Glaucoma No family hx of      Macular Degeneration No family hx of              Immunizations:     Immunization History   Administered Date(s) Administered     COVID-19,PF,Pfizer (12+ Yrs) 2020, 2021, 10/07/2021     COVID-19,PF,Pfizer 12+ YRS BIVALENT Booster 2022     Influenza (H1N1) 2009     Influenza (IIV3) PF 2011, 2013, 2014, 10/14/2015     Influenza (intradermal) 2011     Influenza Vaccine IM > 6 months Valent IIV4 (Alfuria,Fluzone) 10/26/2016, 10/10/2017, 10/12/2019, 10/07/2021, 2022     MMR 2017     TD (ADULT, 7+) 2008     TDAP Vaccine (Adacel) 2017     Tdap (Adacel,Boostrix) 2022            Allergies:   No Known Allergies          Medications:     Medications Prior to Admission   Medication Sig Dispense Refill Last Dose     aspirin (ASA) 81 MG chewable tablet Take 81 mg by mouth daily   10/25/2022 at 1000     Cyanocobalamin (VITAMIN B12 PO)    Past Week     Ferrous Sulfate (IRON  "PO)    Past Week     Prenatal Vit-Fe Fumarate-FA (PRENATAL COMPLETE PO)    Past Week             Review of Systems & Physical Exam:   The Review of Systems is negative other than noted in the HPI      Ht 1.575 m (5' 2\")   Wt 66.2 kg (146 lb)   BMI 26.70 kg/m    Gen: well appearing  CV: RRR, nl S1/S2, no murmurs/clicks/gallops  Lungs: CTAB, non-labored breathing  Abd: Gravid, non-tender, non-distended  Ext: trace peripheral extremity edema    Presentation: Breech with back on maternal right, Fetal head to maternal left of midline by BSUS.  Estimated Fetal Weight: 7lbs.    NST  FHT:  Monitoring External  FHT: Baseline 130 bpm; moderate variability; accels present; no decelerations  TOCO 2-3 contractions in 10 minutes         Data:   Imaging:  10/26: BPP 8/8, Breech presentation  7/22: Level 2 US: EFW 26%, posterior placenta Isolated left sided cleft lip and palate which extends to the midline    Bayron Cade MD, MPH  Ob/Gyn Resident, PGY-2  10/26/22 8:24 AM  "

## 2022-10-26 NOTE — PLAN OF CARE
[facility] for scheduled repeat Low Transverse C/S. VS stable. Positive fetal movement. Connected to Fetal monitor. PIV placed. Procedure explained to patient. Questions answered. Consents signed and secured. Patient prepped for surgery. Pre-operative medications given as ordered. Ambulated to OR 1 at 1205H.

## 2022-10-26 NOTE — OP NOTE
[facility]  Operative Note     Surgery Date:  10/26/2022  Surgeon:  Shahida Raphael MD  Assistants:  Bayron Cade MD PGY2    Pre-op Diagnosis:    - Intrauterine pregnancy at 39w6d  - History of Previous  Section  - Breech Presentation  - Fetal cleft Lip and Palate  - AMA  - IVF pregnancy with donor egg       Post-op Diagnosis:    - Same   - Liveborn female infant     Procedure:    - Repeat low-transverse  section with two layer uterine closure via Pfannenstial incision    Anesthesia:  Spinal with block  QBL:    330 mL  IVF:    1000 mL crystalloid  UOP:    150 mL clear urine at the end of the case  Drains:   Romero Catheter   Specimens:   No Specimens  Antibiotics:  2g Ancef  Additional medications: 1g TXA IV  Complications:  None     Indications:   Liam Ellis is a 42 year old  at 39w6d admitted for scheduled repeat  section with breech presentation. The risks, benefits, and alternatives of  section were discussed with the patient, and she agreed to proceed.     Findings:   1. Moderate intraabdominal adhesions to the anterior abdominal wall including the omentum within the adhesion from the anterior/fundus of uterus  2. Small defect in adhesion suture ligated for hemostasis  3. Clear amniotic fluid  4. Liveborn female infant in Breech presentation. Born at 1238 on 10/26/22. Apgars 8 at 1 minute & 9 at 5 minutes. Weight 3.19 kg.  5. Normal uterus, fallopian tubes, and ovaries. Uterine adhesion from anterior/fundus to anterior recto/fascia.    Procedure Details:   The patient was brought to the OR, where adequate spinal anesthesia was administered. She was placed in the dorsal supine position with a slight leftward tilt. She was prepped and draped in the usual sterile fashion. A surgical time out was performed. A pfannenstiel skin incision was made with the scalpel, and carried down to the underlying fascia with sharp and blunt dissection. The  fascia was incised in the midline, and the incision was extended laterally with the Krishnan scissors. The superior aspect of the fascia was grasped with the Kocher clamps and dissected off of the underlying rectus muscles with blunt and sharp dissection. The rectus muscles were  in the midline, and the peritoneum was entered bluntly, and the opening was extended with digital pressure and sharp dissection in addition to electrocautery. The bladder blade was placed. The vesicouterine peritoneum was incised in the midline, and the incision was extended laterally with the Metzenbaum scissors. A bladder flap was created digitally and the bladder blade was replaced. A transverse hysterotomy was made with the scalpel in the lower uterine segment, and the incision was extended with digital pressure. The infant was noted to be in the footling breech position, and was delivered atraumatically using the standard breech maneuvers. A loose nuchal cord was noted and delivered through. The cord was doubly clamped and cut after 60 seconds, and the infant was handed off to the awaiting nursery staff. A segment of cord was cut and not sent to lab. Cord gasses were not collected. The placenta was delivered with gentle traction on the umbilical cord and uterine massage. The placenta was not sent for pathology. The uterus was cleared of all clots and debris. Uterine tone was noted to be improving with 30 units of pitocin given through the running IV and uterine massage. The patient received 1g of TXA. The hysterotomy was closed with a running locked suture of 0 Vicryl. The hysterotomy was then imbricated using an 0 Monocryl suture. The hysterotomy was noted to be hemostatic. The pericolic gutters were cleared of all clots and debris. The hysterotomy was reexamined and noted to be hemostatic. A defect in the anterior adhesion was ligated with an 0 Monocryl in a running locked fashioonwas until hemostasis was achieved. The fascia  and rectus muscles were examined and areas of oozing were controlled with electrocautery. The fascia was closed with a running 0 Vicryl suture. The subcutaneous tissue was irrigated and areas of oozing were controlled with electrocautery. The subcutaneous tissue was less than 2cm in thickness, and was therefore not closed. The skin was closed with a running subcuticular 4-0 Monocryl suture and covered with steri strips and a sterile dressing.    All sponge, needle, and instrument counts were correct. The patient tolerated the procedure well, and was transferred to recovery in stable condition. Dr. Raphael was present and scrubbed for the procedure.     Bayron Cade MD, MPH  Ob/Gyn Resident, PGY-2  10/26/22 2:54 PM    I was scrubbed and present for entire procedure, agree with above operative note.    MELIDA RAPHAEL MD

## 2022-10-26 NOTE — ANESTHESIA PROCEDURE NOTES
TAP Procedure Note    Pre-Procedure   Staff -        Anesthesiologist:  Luna Brooks MD       Performed By: resident       Location: OR       Procedure Start/Stop Times: 10/26/2022 1:30 PM and 10/26/2022 1:35 PM       Pre-Anesthestic Checklist: patient identified, IV checked, site marked, risks and benefits discussed, informed consent, monitors and equipment checked, pre-op evaluation, at physician/surgeon's request and post-op pain management  Timeout:       Correct Patient: Yes        Correct Procedure: Yes        Correct Site: Yes        Correct Position: Yes        Correct Laterality: N/A        Site Marked: N/A  Procedure Documentation  Procedure: TAP       Diagnosis: POST-OP PAIN CONTROL       Laterality: bilateral       Patient Position: supine       Skin prep: Chloraprep       Insertion Site: T9-10.       Needle Type: short bevel       Needle Gauge: 21.        Needle Length (millimeters): 110        Ultrasound guided       1. Ultrasound was used to identify targeted nerve, plexus, vascular marker, or fascial plane and place a needle adjacent to it in real-time.       2. Ultrasound was used to visualize the spread of anesthetic in close proximity to the above referenced structure.       3. A permanent image is entered into the patient's record.       4. The visualized anatomic structures appeared normal.       5. There were no apparent abnormal pathologic findings.    Assessment/Narrative         Bolus given via needle. no blood aspirated via catheter.        Secured via.        Insertion/Infusion Method: Single Shot       Complications: none       Injection made incrementally with aspirations every 5 mL.    Medication(s) Administered   Bupivacaine 0.25% PF (Infiltration) - Infiltration   20 mL - 10/26/2022 1:30:00 PM  Bupivacaine liposome (Exparel) 1.3% LA inj susp (Infiltration) - Infiltration   20 mL - 10/26/2022 1:30:00 PM  Medication Administration Time: 10/26/2022 1:30 PM      FOR [facility]  "Bank) ONLY:   Pain Team Contact information: please page the Pain Team Via [facility]. Search \"Pain\". During daytime hours, please page the attending first. At night please page the resident first.    "

## 2022-10-26 NOTE — ANESTHESIA POSTPROCEDURE EVALUATION
"Patient: Liam Ellis    Procedure: Procedure(s):   SECTION       Anesthesia Type:  Spinal    Note:  Disposition: Inpatient   Postop Pain Control: Uneventful            Sign Out: Well controlled pain   PONV: No   Neuro/Psych: Uneventful            Sign Out: Acceptable/Baseline neuro status   Airway/Respiratory: Uneventful            Sign Out: Acceptable/Baseline resp. status   CV/Hemodynamics: Uneventful            Sign Out: Acceptable CV status; No obvious hypovolemia; No obvious fluid overload   Other NRE: NONE   DID A NON-ROUTINE EVENT OCCUR? No           Last vitals:  Vitals Value Taken Time   BP 99/74 10/26/22 1500   Temp 36.3  C (97.4  F) 10/26/22 1345   Pulse 69 10/26/22 1507   Resp 17 10/26/22 1415   SpO2 99 % 10/26/22 1507   Vitals shown include unvalidated device data.    Patient Vitals for the past 24 hrs:   BP Temp Pulse Resp SpO2 Height Weight   10/26/22 1500 99/74 -- 89 -- 98 % -- --   10/26/22 1445 105/77 -- 80 -- 99 % -- --   10/26/22 1430 103/76 -- 96 -- 98 % -- --   10/26/22 1415 112/82 -- 87 17 99 % -- --   10/26/22 1400 107/70 -- 90 17 98 % -- --   10/26/22 1345 (!) 68/56 36.3  C (97.4  F) 87 18 98 % -- --   10/26/22 1325 100/60 -- -- 17 97 % -- --   10/26/22 1045 106/68 -- -- -- -- -- --   10/26/22 1041 -- -- -- -- -- 1.575 m (5' 2\") 66.2 kg (146 lb)         Electronically Signed By: Luna Brooks MD  2022  3:08 PM  "

## 2022-10-26 NOTE — PLAN OF CARE
Data: Liam Ellis transferred to 71 via cart at 1600. Baby in NICU.  Action: Receiving unit notified of transfer: Yes. Patient and family notified of room change. Report given to Sary ABDALLA at 1600. Belongings sent to receiving unit. Accompanied by Registered Nurse. Oriented patient to surroundings. Call light within reach.   Response: Patient tolerated transfer and is stable.

## 2022-10-26 NOTE — ANESTHESIA CARE TRANSFER NOTE
Patient: Liam Ellis    Procedure: Procedure(s):   SECTION       Diagnosis: Previous  delivery, antepartum condition or complication [O34.219]  Diagnosis Additional Information: No value filed.    Anesthesia Type:   Spinal     Note:    Oropharynx: spontaneously breathing  Level of Consciousness: awake  Oxygen Supplementation: room air    Independent Airway: airway patency satisfactory and stable  Dentition: dentition unchanged  Vital Signs Stable: post-procedure vital signs reviewed and stable  Report to RN Given: handoff report given  Patient transferred to: PACU    Handoff Report: Identifed the Patient, Identified the Reponsible Provider, Reviewed the pertinent medical history, Discussed the surgical course, Reviewed Intra-OP anesthesia mangement and issues during anesthesia, Set expectations for post-procedure period and Allowed opportunity for questions and acknowledgement of understanding      Vitals:  Vitals Value Taken Time   BP 98/67 10/26/22 1339   Temp     Pulse 87 10/26/22 1344   Resp     SpO2 98 % 10/26/22 1344   Vitals shown include unvalidated device data.    Electronically Signed By: Rasheed Kevin MD  2022  1:45 PM

## 2022-10-26 NOTE — PLAN OF CARE
Goal Outcome Evaluation:  Patient arrived to [facility] unit via cart at 1615, with belongings, accompanied by RN. Received report from AMBROSE Wilcox. Fundus firm U/1. Unit and room orientation started. Call light given and within arms reach; no concerns present at this time. Continue with plan of care.

## 2022-10-26 NOTE — DISCHARGE SUMMARY
[facility] Discharge Summary    Liam Ellis MRN# [id number]   Age: 42 year old YOB: 1980     Date of Admission:  10/26/2022  Date of Discharge::  10/28/2022  Admitting Physician:  Shahida Raphael MD  Discharge Physician:  REJI MUNIZ MD             Admission Diagnoses:   -  at 39w6d  - History of previous  section  - AMA  - IVF pregnancy with donor egg  - Fetal cleft lip and palate  - Breech presenation          Discharge Diagnosis:   -               Procedures:     Procedure(s): Repeat low-transverse  section with two layer uterine closure via Pfannenstial incision  - Spinal                Medications Prior to Admission:     Medications Prior to Admission   Medication Sig Dispense Refill Last Dose     Cyanocobalamin (VITAMIN B12 PO)    Past Week     Ferrous Sulfate (IRON PO)    Past Week     Prenatal Vit-Fe Fumarate-FA (PRENATAL COMPLETE PO)    Past Week     [DISCONTINUED] aspirin (ASA) 81 MG chewable tablet Take 81 mg by mouth daily   10/25/2022 at 1000             Discharge Medications:        Review of your medicines      START taking      Dose / Directions   acetaminophen 325 MG tablet  Commonly known as: TYLENOL  Used for: S/P repeat low transverse       Dose: 650 mg  Take 2 tablets (650 mg) by mouth every 6 hours as needed for mild pain Start after Delivery.  Quantity: 100 tablet  Refills: 0     ibuprofen 600 MG tablet  Commonly known as: ADVIL/MOTRIN  Used for: S/P repeat low transverse       Dose: 600 mg  Take 1 tablet (600 mg) by mouth every 6 hours as needed for moderate pain Start after delivery  Quantity: 60 tablet  Refills: 0     oxyCODONE 5 MG tablet  Commonly known as: ROXICODONE  Used for: S/P repeat low transverse       Dose: 5 mg  Take 1 tablet (5 mg) by mouth every 6 hours as needed for pain  Quantity: 6 tablet  Refills: 0     senna-docusate 8.6-50 MG tablet  Commonly known as: SENOKOT-S/PERICOLACE  Used  for: S/P repeat low transverse       Dose: 1 tablet  Take 1 tablet by mouth daily Start after delivery.  Quantity: 100 tablet  Refills: 0        CONTINUE these medicines which have NOT CHANGED      Dose / Directions   IRON PO      Refills: 0     PRENATAL COMPLETE PO      Refills: 0     VITAMIN B12 PO      Refills: 0        STOP taking    aspirin 81 MG chewable tablet  Commonly known as: ASA              Where to get your medicines      These medications were sent to [facility] [place], [place] - [address]  [address] [facility] [address], [facility] [address]    Phone: [phone]     acetaminophen 325 MG tablet    ibuprofen 600 MG tablet    oxyCODONE 5 MG tablet    senna-docusate 8.6-50 MG tablet               Consultations:   Anesthesiology  NICU  Social Work  Pediatrics          Brief Admission History:   42 year old  admitted at at 39w6d by IVF transfer date and 7w0d US, here for scheduled repeat  section. Her pregnancy has been uncomplicated other than known fetal cleft/lip palate and breech presentation.       Intraoperative course   The procedure was uncomplicated.   mL.  See operative report for details.     Intraoperative findings:  1. Moderate intraabdominal adhesions to the anterior abdominal wall including the omentum within the adhesion from the anterior/fundus of uterus  2. Small defect in adhesion suture liggated for hemostasis  3. Clear amniotic fluid  4. Liveborn female infant in Breech presentation. Born at 1238 on 10/26/22. Apgars 8 at 1 minute & 9 at 5 minutes. Weight 3.19 kg.  5. Normal uterus, fallopian tubes, and ovaries. Uterine adhesion from anterior/fundus to anterior        Postpartum Course   The patient's hospital course was unremarkable.  She recovered as anticipated and experienced no post-operative complications.  On discharge, her pain was well controlled. Vaginal bleeding was appropriate for postpartum period.  Voiding without  difficulty.  Ambulating well and tolerating a normal diet without nausea or emesis.  No fever or significant wound drainage.  Breastfeeding well.  Infant is stable.  Passing flatus and bowel movement.      She was discharged on post-partum day #3.    She was undecided for contraception.    O POS Rhogam not indicated.     Post-partum hemoglobin:   Hemoglobin   Date Value Ref Range Status   10/27/2022 11.9 11.7 - 15.7 g/dL Final   06/21/2019 12.1 11.7 - 15.7 g/dL Final             Discharge Instructions and Follow-Up:     Discharge diet: Regular   Discharge activity: No lifting greater than 20 lbs, pushing, pulling, or other strenuous activity for 6 weeks. Pelvic rest for 6 weeks including no sexual intercourse, tampons, or douching. No driving until you can slam on the brakes without pain or while on narcotic pain medications.    Discharge follow-up: Follow up with primary OB for routine postpartum visit in 6 weeks   Wound care: Keep incision clean and dry           Discharge Disposition:     Discharged to home          Physician Attestation   I saw and evaluated this patient prior to discharge.  I discussed the patient with the resident/fellow and agree with plan of care as documented in the note.      I personally reviewed vital signs, medications, labs and exam.    I personally spent 15 minutes on discharge activities.    Will do 2 and 6 week visit. H/o wound infection last delivery.     Nan Chowdhury MD  Date of Service (when I saw the patient): 10/29/22

## 2022-10-27 LAB — HGB BLD-MCNC: 11.9 G/DL (ref 11.7–15.7)

## 2022-10-27 PROCEDURE — 36415 COLL VENOUS BLD VENIPUNCTURE: CPT

## 2022-10-27 PROCEDURE — 250N000013 HC RX MED GY IP 250 OP 250 PS 637

## 2022-10-27 PROCEDURE — 85018 HEMOGLOBIN: CPT

## 2022-10-27 PROCEDURE — 250N000011 HC RX IP 250 OP 636

## 2022-10-27 PROCEDURE — 120N000002 HC R&B MED SURG/OB UMMC

## 2022-10-27 RX ORDER — IBUPROFEN 800 MG/1
800 TABLET, FILM COATED ORAL EVERY 6 HOURS PRN
Status: DISCONTINUED | OUTPATIENT
Start: 2022-10-27 | End: 2022-10-29 | Stop reason: HOSPADM

## 2022-10-27 RX ADMIN — ACETAMINOPHEN 975 MG: 325 TABLET, FILM COATED ORAL at 19:00

## 2022-10-27 RX ADMIN — SENNOSIDES AND DOCUSATE SODIUM 2 TABLET: 50; 8.6 TABLET ORAL at 09:30

## 2022-10-27 RX ADMIN — OXYCODONE HYDROCHLORIDE 5 MG: 5 TABLET ORAL at 00:05

## 2022-10-27 RX ADMIN — OXYCODONE HYDROCHLORIDE 5 MG: 5 TABLET ORAL at 08:57

## 2022-10-27 RX ADMIN — OXYCODONE HYDROCHLORIDE 5 MG: 5 TABLET ORAL at 15:25

## 2022-10-27 RX ADMIN — DIPHENHYDRAMINE HYDROCHLORIDE 25 MG: 25 CAPSULE ORAL at 00:31

## 2022-10-27 RX ADMIN — SENNOSIDES AND DOCUSATE SODIUM 1 TABLET: 50; 8.6 TABLET ORAL at 19:00

## 2022-10-27 RX ADMIN — KETOROLAC TROMETHAMINE 30 MG: 30 INJECTION, SOLUTION INTRAMUSCULAR at 02:50

## 2022-10-27 RX ADMIN — OXYCODONE HYDROCHLORIDE 5 MG: 5 TABLET ORAL at 21:39

## 2022-10-27 RX ADMIN — IBUPROFEN 800 MG: 800 TABLET, FILM COATED ORAL at 23:45

## 2022-10-27 RX ADMIN — ACETAMINOPHEN 975 MG: 325 TABLET, FILM COATED ORAL at 13:19

## 2022-10-27 RX ADMIN — IBUPROFEN 800 MG: 800 TABLET, FILM COATED ORAL at 18:14

## 2022-10-27 RX ADMIN — IBUPROFEN 800 MG: 800 TABLET, FILM COATED ORAL at 11:21

## 2022-10-27 RX ADMIN — ACETAMINOPHEN 975 MG: 325 TABLET, FILM COATED ORAL at 07:00

## 2022-10-27 RX ADMIN — OXYCODONE HYDROCHLORIDE 5 MG: 5 TABLET ORAL at 04:53

## 2022-10-27 ASSESSMENT — ACTIVITIES OF DAILY LIVING (ADL)
ADLS_ACUITY_SCORE: 18

## 2022-10-27 NOTE — PROGRESS NOTES
"[facility]   Post-partum Progress Note    Name:  Liam Ellis  MRN: [id number]    S:   Patient reports feeling well.  Pain well controlled.  Tolerating regular diet without nausea or vomiting.  Ambulating without dizziness.  Voiding spontaneously. Has passed flatus; has not had bowel movement. Lochia minimal/similar to menstrual flow. Denies fever/chills, headache, vision changes, chest pain, shortness of breath, RUQ pain, increased edema. Working on pumping, baby bottle feeding in NICU.  Declined contraception.  O:   Patient Vitals for the past 24 hrs:   BP Temp Temp src Pulse Resp SpO2 Height Weight   10/27/22 0100 110/72 97.7  F (36.5  C) Oral 72 18 99 % -- --   10/26/22 2137 109/76 97.9  F (36.6  C) Oral 66 17 100 % -- --   10/26/22 2025 105/76 97.9  F (36.6  C) Oral 66 16 100 % -- --   10/26/22 1924 115/76 98  F (36.7  C) Oral 80 17 100 % -- --   10/26/22 1837 121/84 -- -- -- 18 100 % -- --   10/26/22 1730 104/72 -- -- -- 18 100 % -- --   10/26/22 1700 98/68 -- -- -- 18 98 % -- --   10/26/22 1624 111/75 97.8  F (36.6  C) Axillary -- 18 100 % -- --   10/26/22 1515 100/72 -- -- 84 -- 97 % -- --   10/26/22 1500 99/74 -- -- 89 -- 98 % -- --   10/26/22 1445 105/77 -- -- 80 -- 99 % -- --   10/26/22 1430 103/76 -- -- 96 -- 98 % -- --   10/26/22 1415 112/82 -- -- 87 17 99 % -- --   10/26/22 1400 107/70 -- -- 90 17 98 % -- --   10/26/22 1345 (!) 68/56 97.4  F (36.3  C) -- 87 18 98 % -- --   10/26/22 1325 100/60 -- -- -- 17 97 % -- --   10/26/22 1045 106/68 -- -- -- -- -- -- --   10/26/22 1041 -- -- -- -- -- -- 1.575 m (5' 2\") 66.2 kg (146 lb)     Gen:  Resting comfortably, NAD  CV:  RRR  Pulm:  Non-labored breathing. No cough or audible wheezing.   Abd:  Soft, appropriately tender to palpation, non-distended.  Fundus below umbilicus, firm, and non-tender.  Inc:  Bandage in place, c/d/i with minimal overlying shadowing, no surrounding erythema or edema  Ext:  Non-tender, minimal/trace LE edema " b/l    Hgb:   Recent Labs   Lab 10/27/22  0757 10/24/22  1008   HGB 11.9 12.9     A/P:  42 year old  POD#1 s/p RLTCS. Pregnancy notable for breech presentation, hx c/s, AMA, gestational diabetes, fetal cleft lip and palate and IVF pregnancy with donor egg. Meeting early postop goals. Continue with routine postpartum management.     Routine Postpartum  Pain: Well-controlled with scheduled tylenol, toradol > ibuprofen, PRN oxy  Hgb: 12.9 >  (TXA) > 11.9  GI:  BID Senna/Colace.  PRN Simethicone.   PPx:  Encouraged ambulation   Rh: Positive  Rub:  Immune  Hep B: Surface antigen negative  Baby: In NICU, doing well, working on feeding  Feed: Working on pumping, bottle feeding in NICU d/t child cleft  BC: Unsure.  Discussed pregnancy spacing and awaiting to attempt next pregnancy for 1 year  Dispo: Plan for home on POD#2-3.      Bayron Cade MD, MPH  Ob/Gyn Resident, PGY-2  10/27/22 8:12 AM    Physician Attestation   I personally examined and evaluated this patient.  I discussed the patient with the resident/fellow and care team, and agree with the assessment and plan of care as documented in the note of 10/27/22.      I personally reviewed vital signs, medications and labs.    Key findings: Liam is a 42 year old  POD#1 s/p RLTCS, currently recovering well.  Patient states her pain is controlled, voiding without issues, tolerating regular diet, ambulating with minimal dizziness.  Post op Hgb normal.  Likely discharge to home tomorrow.  Yolanda Locke MD  Date of Service (when I saw the patient): 10/27/22

## 2022-10-27 NOTE — PLAN OF CARE
[id number]    Liam doing well, has been in NICU to see baby and then spending time with visitors in her room. Pain managed with tyl, ibu, oxy. PIV removed per Pt request. She plans to shower tonight and will remove her incision dressing in the shower. No concerns.

## 2022-10-27 NOTE — PROVIDER NOTIFICATION
10/26/22 5849   Provider Notification   Provider Name/Title Dr. PHILIP Mendoza   Method of Notification Electronic Page   Request Evaluate-Remote   Notification Reason Medication Request   Pt requesting med for itching, wants it on stand by. Thanks.

## 2022-10-27 NOTE — PLAN OF CARE
Goal Outcome Evaluation:      Plan of Care Reviewed With: patient, spouse    Overall Patient Progress: improvingOverall Patient Progress: improving     VSS. Fundus midline, firm, and U/1. Scant lochia. Incisional dressing is C/D/I. Pain adequately managed with tylenol. Romero in place, draining well. tolerating regular diet. Pt verbalized wanting to pump, and pumping for  in NICU. Bonding well with . Continue with plan of care.

## 2022-10-27 NOTE — CONSULTS
[facility] CHILDREN'S \[facility]\""  MATERNAL CHILD HEALTH   INITIAL NICU PSYCHOSOCIAL ASSESSMENT     DATA:     Presenting Information: Mom, Liam, is a 42 year old  who delivered an infant girl , Who does not yet have a name on 10/26/2022 at 39w6d gestation in the setting of . Baby was admitted to the NICU for Cleft Lip & Pallet causing feeding difficulties.  SW was consulted to meet with this family per NICU admission of infant.     Living Situation: Parents, Liam and Oscar, are unmarried but in a relationship and live together in [place], along with 1 other child.    Social Support: Liam has a support system that includes her immediate family, her sister was also with her in her room and is a significant source of support.      Education/Employment: Pt is currently employed, but SW did not get a chance to discuss where.     Insurance: Pt has [employer] primary and [place] secondary.     Source of Financial Support: parental employment    Mental Health History: Pt denies a history of mental health symptoms including during and after her last pregnancy.      History of Postpartum Mood Disorders: None per pt      Chemical Health History: None per pt.     Legal/Child Protection Involvement: None per pt     INTERVENTION:       Chart review    Conducted Psychosocial Assessment    Introduction to Maternal Child Health SW role and scope of practice    Orientation to the NICU (parking, lodging, meals, visitation)    Validated emotions and provided supportive listening    SW described process for birth certificate, social security card and insurance process.     Provided resources and referrals    Info for WIC  and list of places to get lower cost diapers/clothing     Provided psychoeducation on  mood disorders and indicated that SW would continue to monitor mood and support bridging to mental health resources as needed.    Provided SW contact info    ASSESSMENT:     Coping:  Patient reports coping okay with what is going on but being concerned about what it will look like when she returns home. SW validated this and let her know she would look into resources around cleft lip/pallet for her. She stated that she feels it might be helpful to have a home visiting nurse, SW provided a referral for this. She also inquired about formula and the best places to find this. SW referred her to [facility] and sent this info to her via Email.     Assessment of parental risk for PMAD: Higher than average risk, considering medically complexity.    Risk Factors: other children at home needing care and attention  and hospitalization during a global pandemic    Resiliency Factors & Strengths: local family, experienced parents, strong social support, parental employment, stable housing, reliable transportation, able and willing to ask for help/accept help, spiritual support, demonstrated commitment to being present and engaged in baby's cares and actively seeking resources     PLAN:     SW will continue to follow for supportive intervention.    JOY Olivo, [facility]  Maternal and Child Health   Office: [phone]  Pager: [phone]  After Hours Pager: [phone]  [email or web address]

## 2022-10-27 NOTE — PROVIDER NOTIFICATION
10/27/22 0802   Provider Notification   Provider Name/Title Dr. Cade   Method of Notification Electronic Page   Request Evaluate in Person   Notification Reason Medication Request     Ibuprofen request, modify to be due now, Toradol now discontinued.

## 2022-10-27 NOTE — PLAN OF CARE
VSS. Patient c/o pain, Tylenol, Toradol IV and Oxycodone  given and helpful. Fundus firm without massage at 1 cm below level of umbilicus. Scant lochia noted. Patient assisted to bathroom, Romero cath taken out, patient was able to urinate freely with x 2 urine output greater than 200, passing gas and able to walk with some assistance. Patient also pumping for  in NICU. Patient itching, PRN Benadryl given. Will continue with plan of care.

## 2022-10-27 NOTE — PLAN OF CARE
Problem: Plan of Care - These are the overarching goals to be used throughout the patient stay.    Goal: Optimal Comfort and Wellbeing  Intervention: Monitor Pain and Promote Comfort  Recent Flowsheet Documentation  Taken 10/27/2022 1319 by Binta Mariscal RN  Pain Management Interventions: medication (see MAR)  Taken 10/27/2022 1121 by Binta Mariscal RN  Pain Management Interventions:   pain management plan reviewed with patient/caregiver   medication (see MAR)  Taken 10/27/2022 0857 by Binta Mariscal RN  Pain Management Interventions: pain management plan reviewed with patient/caregiver     VSS, assessments WDL. Up ad lexy, independent. Incision is still covered and dressing is clean dry intact. Pt is pumping, getting some mLs. Passing gas, voiding without difficulty. Reports pain is manageable with Ibu, Tyl, oxycodone prn. She visited baby in NICU for the first time today and is going down to visit to help with feedings. Will continue with plan of care.

## 2022-10-28 PROCEDURE — 250N000013 HC RX MED GY IP 250 OP 250 PS 637

## 2022-10-28 PROCEDURE — 120N000002 HC R&B MED SURG/OB UMMC

## 2022-10-28 RX ORDER — ACETAMINOPHEN 325 MG/1
650 TABLET ORAL EVERY 6 HOURS PRN
Qty: 100 TABLET | Refills: 0 | Status: SHIPPED | OUTPATIENT
Start: 2022-10-28 | End: 2022-12-06

## 2022-10-28 RX ORDER — AMOXICILLIN 250 MG
1 CAPSULE ORAL DAILY
Qty: 100 TABLET | Refills: 0 | Status: SHIPPED | OUTPATIENT
Start: 2022-10-28 | End: 2022-12-06

## 2022-10-28 RX ORDER — IBUPROFEN 600 MG/1
600 TABLET, FILM COATED ORAL EVERY 6 HOURS PRN
Qty: 60 TABLET | Refills: 0 | Status: SHIPPED | OUTPATIENT
Start: 2022-10-28 | End: 2022-12-06

## 2022-10-28 RX ORDER — OXYCODONE HYDROCHLORIDE 5 MG/1
5 TABLET ORAL EVERY 6 HOURS PRN
Qty: 6 TABLET | Refills: 0 | Status: SHIPPED | OUTPATIENT
Start: 2022-10-28 | End: 2022-11-02

## 2022-10-28 RX ADMIN — OXYCODONE HYDROCHLORIDE 5 MG: 5 TABLET ORAL at 16:32

## 2022-10-28 RX ADMIN — ACETAMINOPHEN 975 MG: 325 TABLET, FILM COATED ORAL at 01:27

## 2022-10-28 RX ADMIN — SENNOSIDES AND DOCUSATE SODIUM 2 TABLET: 50; 8.6 TABLET ORAL at 08:57

## 2022-10-28 RX ADMIN — IBUPROFEN 800 MG: 800 TABLET, FILM COATED ORAL at 14:25

## 2022-10-28 RX ADMIN — OXYCODONE HYDROCHLORIDE 5 MG: 5 TABLET ORAL at 22:11

## 2022-10-28 RX ADMIN — OXYCODONE HYDROCHLORIDE 5 MG: 5 TABLET ORAL at 01:27

## 2022-10-28 RX ADMIN — SENNOSIDES AND DOCUSATE SODIUM 2 TABLET: 50; 8.6 TABLET ORAL at 20:23

## 2022-10-28 RX ADMIN — IBUPROFEN 800 MG: 800 TABLET, FILM COATED ORAL at 20:23

## 2022-10-28 RX ADMIN — ACETAMINOPHEN 975 MG: 325 TABLET, FILM COATED ORAL at 14:25

## 2022-10-28 RX ADMIN — ACETAMINOPHEN 975 MG: 325 TABLET, FILM COATED ORAL at 07:15

## 2022-10-28 RX ADMIN — IBUPROFEN 800 MG: 800 TABLET, FILM COATED ORAL at 06:35

## 2022-10-28 RX ADMIN — ACETAMINOPHEN 975 MG: 325 TABLET, FILM COATED ORAL at 20:23

## 2022-10-28 ASSESSMENT — ACTIVITIES OF DAILY LIVING (ADL)
ADLS_ACUITY_SCORE: 18

## 2022-10-28 NOTE — LACTATION NOTE
"This note was copied from a baby's chart.  D:  I met with Liam on [facility] is her second baby.  She tried to breastfeed and pump with her first child, now 5 years old, however she states she had many delivery complications and was not able to produce milk. We discussed her history briefly, she reports breast growth in pregnancy, per chart this is an IVF pregnancy with donor egg. We discussed pumping frequently on schedule, hands on pumping, hand expression, skin to skin holding and optimizing these to see what her body can do. She is normally in good health, takes no medications, and has no history of breast/chest surgery or trauma.  She has already started to pump getting drops.   I:  I gave her a folder of introductory materials and went over pumping guidelines.  I reviewed physiology of colostrum and milk production, pumping guidelines, and I gave her a log and encouraged her to use it.   I explained how to access the videos \"Hand Expression\" and \"Maximizing Milk Production\"; as well as other helpful books and websites.   We discussed hands-on pumping techniques and usefulness of a hands-free pumping bra, she is using a belly band.  We discussed skin to skin holding and how to reach your breastfeeding goals.  We talked about medications during breastfeeding.  She verbalized understanding via teachback. Liam has a new Spectra and a portable pump; we discussed the pros and cons of different pump types. She stated infant may be transferring back to [facility] with her this afternoon, so NICU lactation would not be following any longer. With her history, I told her I wound bring her information for outpatient lactation resources and discussed when to follow up.   A:  Mom has information she needs to initiate her supply, needs close follow up.   P: Will continue to provide lactation support if remains in NICU, will be followed by [facility] once transfers. Will provide outpatient resources for mom.     Amairani Bartholomew, " [place], RN, IBCLC

## 2022-10-28 NOTE — LACTATION NOTE
"This note was copied from a baby's chart.  Printed resources below, gave to mom on [facility]. No further questions for NICU lactation at this time. Mom shares she is looking forward to infant transferring back to [facility] today.     LACTATION SUPPORT RESOURCES:     [facility]:   Tracy Vargas, ELLEN, CNRM, IBCLC  Tuesday:  Critical access hospital,  8:30 - 5:00,  [phone]  Wednesday:  [facility], 7th floor, 8:30 - 4:00, [phone]  Thursday:  [facility], [facility], 8:30 - 4:00,  [phone]    Breastfeeding Connection at [facility]  [phone]   Breastfeeding Connection at [facility]   [phone]  [facility] Lactation Services    [phone]  [facility]      [phone]  [facility]      [phone]  [facility]      [phone]    [facility]       [phone]  [facility]       [phone]      Other Lactation Help:    BabyCafes (www.babycafeusa.org):    [place] [facility]/ [place] (Tues/Wed)     o [phone]    [facility] Baby Weigh In (various times and locations)    o www.UGE ++HAS VIRTUAL SUPPORT++     Enlightened Mama   o [employer] [phone]    Everyday Miracles         o [email or web address].everyday-miracles.[email or web address]    [facility]     o [phone] ++HAS VIRTUAL SUPPORT++     Jeri Nunez DO, MPH, ABOIM, IBCLC  o Integrative Family Medicine Physician/Breastfeeding Medicine/ Home visits  o wwwLinktone  [phone]    [facility] \"Well Fed\" postpartum group ([facility])   o [phone]     [facility]     o [email or web address]    Chocolate Milk Club:    o [email or web address]-milk-club/    [facility] Moms (Dynamic Involved Valued  Moms)     [phone]    [facility] [place] Breastfeeding [facility]  o See Facebook " site  o [email or web address]     [place] [facility] [place]      o See Facebook site  o [email or web address]  [phone]

## 2022-10-28 NOTE — PLAN OF CARE
Goal Outcome Evaluation:      Plan of Care Reviewed With: patient    Overall Patient Progress: improving     AFVSS. Postpartum assessments WDL. Patient is ambulating and voiding without difficulty. She states is passing flatus and has had BM. Incision well approximated, steri strips intact and free of s/s infection. Patient states incisional pain controlled with ibuprofen, tylenol and oxycodone. Breast pumping for baby in NICU. Visiting baby in NICU frequently. Continue to provide support and education as needed. Continue present cares.

## 2022-10-28 NOTE — PROGRESS NOTES
[facility]   Post-partum Progress Note    Name:  Liam Ellis  MRN: [id number]    S:   Patient reports feeling good.  Pain well controlled.  Tolerating regular diet without nausea or vomiting.  Ambulating without dizziness.  Voiding spontaneously. Has passed flatus; has  had bowel movement. Lochia decreased, flow, slight hematuria Denies fever/chills, headache, chest pain, shortness of breath, RUQ pain, increased edema. Undecided for contraception after experiencing weight gain with IUD.    O:   Patient Vitals for the past 24 hrs:   BP Temp Temp src Pulse Resp   10/27/22 2341 107/66 97.9  F (36.6  C) Oral 66 18   10/27/22 2135 119/73 98.3  F (36.8  C) Oral 79 18   10/27/22 1300 95/66 98.4  F (36.9  C) Oral 80 17   10/27/22 0929 118/82 99  F (37.2  C) Oral 90 18     Gen:  Resting comfortably, NAD  CV:  RRR  Pulm:  Non-labored breathing. No cough or audible wheezing.   Abd:  Soft, appropriately tender to palpation, non-distended.  Fundus below umbilicus, firm, and non-tender.  Inc:  steristrips in place, c/d/i, no surrounding erythema or edema  Ext:  Non-tender, trace+ LE edema b/l    Hgb:   Recent Labs   Lab 10/27/22  0757 10/24/22  1008   HGB 11.9 12.9     A/P:  42 year old  POD#2 s/p RLTCS. Pregnancy notable for breech presentation, hx c/s, AMA, gestational diabetes, fetal cleft lip and palate and IVF pregnancy with donor egg. Meeting postop goals.    #Routine Postpartum  Pain: Well-controlled with scheduled tylenol, toradol > ibuprofen, PRN oxy  Hgb: 12.9 >  > 11.9. VSS as noted above, asymptomatic. Will discharge with PO Fe if hgb < 10  GI:  BID Senna/Colace.  PRN Simethicone.   PPx:  Encouraged ambulation   Rh: Positive  Rub:  Immune  Hep B: Surface antigen negative  Baby: In NICU, doing well fed 20ml breast milk  Feed: Pumping bottle deeding in NICU d/t child cleft  BC: Undecided for contraception. Discussed pregnancy spacing and awaiting to attempt next pregnancy for 18  months.  Dispo: Plan for home on POD#2.      Jv Alex, MS3     Resident/Fellow Attestation   I, Lynne Fajardo, was present with the medical student who participated in the service and in the documentation of the note.  I have verified the history and personally performed the physical exam and medical decision making.  I agree with the assessment and plan of care as documented in the note.  I have reviewed the note and made changes as necessary.    Lynne Fajardo MD  ObGyn Resident PGY-3  10/28/2022        Physician Attestation   I, Letty Duran, personally saw and examined Liam.  I have reviewed her vitals, labs, and medications. I have discussed the plan of care with the resident. Patient is doing well.  She is pumping for baby. Voiding and had a BM.   Hemoglobin   Date Value Ref Range Status   10/27/2022 11.9 11.7 - 15.7 g/dL Final   10/24/2022 12.9 11.7 - 15.7 g/dL Final   06/21/2019 12.1 11.7 - 15.7 g/dL Final   05/26/2017 9.2 (L) 11.7 - 15.7 g/dL Final     discussed routine post op/ pp care, restrictions and normal expectations for recovery.  Plan discharge home later today as long as baby is ok for discharge from the NICU today.   Letty Duran MD  October 28, 2022

## 2022-10-28 NOTE — PLAN OF CARE
Data: Vital signs within normal limits. Postpartum checks within normal limits - see flow record. Patient eating and drinking normally. Patient able to empty bladder independently and is up ambulating. No apparent signs of infection. Incision healing well. Patient performing self cares and is able to pump for infant in NICU.  Action: Patient medicated during the shift for pain. See MAR. Patient reassessed within 1 hour after each medication and pain was improved - patient stated she was comfortable. Patient education done about pain management and self cares. See flow record.  Response: Positive attachment behaviors observed with infant in NICU. Support persons mother present.   Plan: Anticipate discharge on 10/29.  Goal Outcome Evaluation:      Plan of Care Reviewed With: patient    Overall Patient Progress: improvingOverall Patient Progress: improving

## 2022-10-28 NOTE — PLAN OF CARE
Goal Outcome Evaluation:     Brought patient and spouse to the NICU to do feeding and meet with OT.

## 2022-10-28 NOTE — PLAN OF CARE
Goal Outcome Evaluation:    EDS completed and score 1.   Infant transferred to [facility] to room in with mom.

## 2022-10-28 NOTE — PLAN OF CARE
Goal Outcome Evaluation:    Data: Vital signs within normal limits. Postpartum checks within normal limits - see flow record. Patient eating and drinking normally. Patient able to empty bladder independently and is up ambulating. No apparent signs of infection. Incision healing well. Patient performing self cares. Patient has been pumping and visiting infant in NICU and preparing for infant to transfer to [facility].  Action: Patient medicated during the shift for pain. See MAR. Patient reassessed within 1 hour after each medication and pain was improved - patient stated she was comfortable. Patient education done about self care. See flow record.  Response: .Support persons was present.   Plan: Anticipate discharge on 10/594116.

## 2022-10-29 VITALS
TEMPERATURE: 98 F | RESPIRATION RATE: 26 BRPM | HEART RATE: 90 BPM | OXYGEN SATURATION: 99 % | WEIGHT: 146 LBS | DIASTOLIC BLOOD PRESSURE: 76 MMHG | SYSTOLIC BLOOD PRESSURE: 108 MMHG | BODY MASS INDEX: 26.87 KG/M2 | HEIGHT: 62 IN

## 2022-10-29 PROCEDURE — 250N000013 HC RX MED GY IP 250 OP 250 PS 637

## 2022-10-29 RX ADMIN — OXYCODONE HYDROCHLORIDE 5 MG: 5 TABLET ORAL at 10:25

## 2022-10-29 RX ADMIN — ACETAMINOPHEN 975 MG: 325 TABLET, FILM COATED ORAL at 10:25

## 2022-10-29 RX ADMIN — ACETAMINOPHEN 975 MG: 325 TABLET, FILM COATED ORAL at 02:55

## 2022-10-29 RX ADMIN — SENNOSIDES AND DOCUSATE SODIUM 2 TABLET: 50; 8.6 TABLET ORAL at 10:25

## 2022-10-29 RX ADMIN — IBUPROFEN 800 MG: 800 TABLET, FILM COATED ORAL at 02:55

## 2022-10-29 RX ADMIN — IBUPROFEN 800 MG: 800 TABLET, FILM COATED ORAL at 10:25

## 2022-10-29 ASSESSMENT — ACTIVITIES OF DAILY LIVING (ADL)
ADLS_ACUITY_SCORE: 18

## 2022-10-29 NOTE — PLAN OF CARE
Goal Outcome Evaluation:  Discharge to home today in stable condition and discharge education and instructions were reviewed. Encouraged to call clinic per discharge instructions.

## 2022-10-29 NOTE — PLAN OF CARE
Goal Outcome Evaluation:  Data: Vital signs within normal limits. Postpartum checks within normal limits - see flow record. Patient eating and drinking normally. Patient able to empty bladder independently and is up ambulating. No apparent signs of infection. Incision healing well. Patient performing self cares and is able to care for infant. Patient is pumping.  Action: Patient medicated during the shift for pain. See MAR. Patient reassessed within 1 hour after each medication and pain was improved - patient stated she was comfortable. Patient education done about infant cares. See flow record.  Response: Positive attachment behaviors observed with infant. Support persons was present.   Plan: Anticipate discharge today.  Patient was educated on feeding baby, patient was proficient and felt comfortable in doing so.

## 2022-10-29 NOTE — PROGRESS NOTES
"[facility]    Obstetrics Post-Op / Progress Note    Assessment & Plan   Assessment:  -3 Days Post-Op  Procedure(s):   SECTION    Doing well.  Normal healing wound.  No immediate surgical complications identified.  Pain well-controlled.    Plan:  Dischargetoday    Nan Chowdhury MD     Interval History   Doing well.  Pain is well-controlled.  No fevers.  No history of wound drainage, warmth or significant erythema.  Good appetite.  Denies chest pain, shortness of breath, nausea or vomiting.  Ambulatory.  Breastfeeding well.    Medications     bupivacaine liposome (EXPAREL) LA inj was given in the infiltration site to produce post-op analgesia. Duration of action is up to 72 hours. Other \"perfecto\" meds should not be given for 96 hours except for lidocaine 4% patch. This is for INFORMATION ONLY.       dextrose 5% lactated ringers       - MEDICATION INSTRUCTIONS -       - MEDICATION INSTRUCTIONS -       oxytocin in 0.9% NaCl         acetaminophen  975 mg Oral Q6H     senna-docusate  1 tablet Oral BID    Or     senna-docusate  2 tablet Oral BID     sodium chloride (PF)  3 mL Intracatheter Q8H       Physical Exam   Temp: 98  F (36.7  C) Temp src: Oral BP: 108/76 Pulse: 90   Resp: 26   O2 Device: None (Room air)    Vitals:    10/26/22 1041   Weight: 66.2 kg (146 lb)     Vital Signs with Ranges  Temp:  [98  F (36.7  C)-98.2  F (36.8  C)] 98  F (36.7  C)  Pulse:  [90] 90  Resp:  [16-26] 26  BP: (108-110)/(76) 108/76  No intake/output data recorded.    Uterine fundus is firm, non-tender and at the level of the umbilicus  Incision C/D/I    Data   Recent Labs   Lab Test 10/24/22  1008 22  1042 05/15/17  1847   ABO  --   --  O   RH  --   --   Pos   AS Negative   < > Neg    < > = values in this interval not displayed.     Recent Labs   Lab Test 10/27/22  0757 10/24/22  1008   HGB 11.9 12.9     Recent Labs   Lab Test 22  1042   RUQIGG Positive       "

## 2022-10-29 NOTE — PLAN OF CARE
Goal Outcome Evaluation:      Plan of Care Reviewed With: patient, spouse    Overall Patient Progress: improvingOverall Patient Progress: improving    VSS. Fundus midline, firm, and U/1. Scant lochia. Incision approximated with adhesive strips; no drainage noted. Pain adequately managed with current scheduled and PRN for breakthrough pain. Ambulating independently, tolerating regular diet, and voiding without difficulty. Pumping independently. [place] transferred from NICU to [facility]. Formula feeding independently. Bonding well with . Continue with plan of care.

## 2022-10-31 ENCOUNTER — TELEPHONE (OUTPATIENT)
Dept: FAMILY MEDICINE | Facility: CLINIC | Age: 42
End: 2022-10-31

## 2022-10-31 NOTE — TELEPHONE ENCOUNTER
ENOCHM to schedule a  visit.   has a  appt 11/3/2022 at 11:10 arrival time. If patient calls back please assist in scheduling.    Kole Sharma, CMA

## 2022-11-02 ENCOUNTER — OFFICE VISIT (OUTPATIENT)
Dept: OBGYN | Facility: CLINIC | Age: 42
End: 2022-11-02
Payer: COMMERCIAL

## 2022-11-02 VITALS
TEMPERATURE: 98.6 F | OXYGEN SATURATION: 95 % | BODY MASS INDEX: 24.84 KG/M2 | HEART RATE: 85 BPM | WEIGHT: 135 LBS | HEIGHT: 62 IN | DIASTOLIC BLOOD PRESSURE: 80 MMHG | SYSTOLIC BLOOD PRESSURE: 114 MMHG

## 2022-11-02 DIAGNOSIS — G89.18 ACUTE POST-OPERATIVE PAIN: ICD-10-CM

## 2022-11-02 DIAGNOSIS — Z98.891 S/P REPEAT LOW TRANSVERSE C-SECTION: Primary | ICD-10-CM

## 2022-11-02 PROCEDURE — 99024 POSTOP FOLLOW-UP VISIT: CPT | Performed by: OBSTETRICS & GYNECOLOGY

## 2022-11-02 RX ORDER — OXYCODONE HYDROCHLORIDE 5 MG/1
5 TABLET ORAL EVERY 6 HOURS PRN
Qty: 6 TABLET | Refills: 0 | Status: SHIPPED | OUTPATIENT
Start: 2022-11-02 | End: 2022-12-06

## 2022-11-02 ASSESSMENT — PATIENT HEALTH QUESTIONNAIRE - PHQ9: SUM OF ALL RESPONSES TO PHQ QUESTIONS 1-9: 2

## 2022-11-02 NOTE — PROGRESS NOTES
"OBGYN CLINIC VISIT  2022  CC: incision check    HPI:  Liam Ellis is a 42 year old  who presents 1 week post op status repeat  section.    Headache and low grade fever for past 3 days. Headache throughout the day. No cough, no SOB, no sore throat. Swelling in bilateral axilla. Breasts engorged. This did not happen last time but did not breastfeed first baby. Pain still significant, requesting more oxycodone.    Vitals:    22 1318   BP: 114/80   Pulse: 85   Temp: 98.6  F (37  C)   SpO2: 95%   Weight: 61.2 kg (135 lb)   Height: 1.575 m (5' 2\")       Gen: alert, oriented, no distress  Breasts: engorged, no erythema, nontender, swelling in bilateral axilla consistent with engorged breast tissue  Abd: nontender, nondistended, fundus firm and below umbilicus  Incision: well approximated. steristrips removed. No erythema.    ASSESSMENT:  42 year old  1 week post op status post repeat CS  Breast engorgement with breast tissue in bilateral axilla    1. S/P repeat low transverse     2. Acute post-operative pain  Discussed limited oxycodone rx, should be weaned off in next few days. Continue ibuprofen and tylenol.  - oxyCODONE (ROXICODONE) 5 MG tablet; Take 1 tablet (5 mg) by mouth every 6 hours as needed for pain  Dispense: 6 tablet; Refill: 0      RTC 5 weeks for postpartum visit, sooner PRN.    Sonia Ventura MD    "

## 2022-11-28 ENCOUNTER — MEDICAL CORRESPONDENCE (OUTPATIENT)
Dept: HEALTH INFORMATION MANAGEMENT | Facility: CLINIC | Age: 42
End: 2022-11-28

## 2022-12-06 ENCOUNTER — PRENATAL OFFICE VISIT (OUTPATIENT)
Dept: OBGYN | Facility: CLINIC | Age: 42
End: 2022-12-06
Payer: COMMERCIAL

## 2022-12-06 VITALS
BODY MASS INDEX: 23.89 KG/M2 | OXYGEN SATURATION: 96 % | DIASTOLIC BLOOD PRESSURE: 73 MMHG | HEART RATE: 82 BPM | SYSTOLIC BLOOD PRESSURE: 101 MMHG | WEIGHT: 130.6 LBS

## 2022-12-06 DIAGNOSIS — Z30.09 GENERAL COUNSELING FOR PRESCRIPTION OF ORAL CONTRACEPTIVES: ICD-10-CM

## 2022-12-06 PROBLEM — Z23 NEED FOR TDAP VACCINATION: Status: RESOLVED | Noted: 2022-04-05 | Resolved: 2022-12-06

## 2022-12-06 PROCEDURE — 99207 PR POST PARTUM EXAM: CPT | Performed by: OBSTETRICS & GYNECOLOGY

## 2022-12-06 RX ORDER — ACETAMINOPHEN AND CODEINE PHOSPHATE 120; 12 MG/5ML; MG/5ML
0.35 SOLUTION ORAL DAILY
Qty: 90 TABLET | Refills: 4 | Status: SHIPPED | OUTPATIENT
Start: 2022-12-06 | End: 2024-02-09

## 2022-12-06 NOTE — PROGRESS NOTES
Liam is here for a 6-week postpartum checkup.    She had a repeat c/s of a viable girl, weight 7 pounds 0 oz., with no complications.  Since delivery, she has been pumping and bottle feeding due to baby with cleft lip/palate.  Her supply is not great, so she does supplement with formula.  She has no signs of infection, bleeding or other complications.  She is not pregnant.  We discussed contraceptions and she has chosen mini pill / progesterone only pill.  Things have been difficult because her mother is here to help with the kids and she recently broke her wrist and can't hold the baby or help with feedings.  Liam is stress about having to RTW end of January when baby has her first surgery on 1/5.  The pediatric surgeon already offered to give her a letter in support of longer maternity leave until mother is able to help. She feels overall her mood is good and no depression.    EXAM:    HEENT: grossly normal.  NECK: no lymphadenopathy or thyroidomegaly.  LUNGS: CTA X 2, no rales or crackles.  BACK: No spinal or CVA tenderness.  HEART: RRR without murmurs clicks or gallops.  ABDOMEN: soft, non tender, good bowel sounds, without masses rebound, guarding or tenderness. Incision well healed.    PELVIC:    deferred    EXTREMITIES: Warm to touch, good pulses, no ankle edema or calf tenderness.  NEUROLOGIC: grossly normal.    ASSESSMENT:   Normal 6-week postpartum exam after repeat c/s.    PLAN:  Pap smear utd and mini pill / progesterone only pill for contraception. Ok to resume normal activities.  RTC yearly or prn.    MELIDA ANDINO MD

## 2023-01-18 ENCOUNTER — MEDICAL CORRESPONDENCE (OUTPATIENT)
Dept: HEALTH INFORMATION MANAGEMENT | Facility: CLINIC | Age: 43
End: 2023-01-18

## 2023-03-17 ENCOUNTER — MYC MEDICAL ADVICE (OUTPATIENT)
Dept: FAMILY MEDICINE | Facility: CLINIC | Age: 43
End: 2023-03-17

## 2023-03-17 ENCOUNTER — HOSPITAL ENCOUNTER (OUTPATIENT)
Dept: GENERAL RADIOLOGY | Facility: HOSPITAL | Age: 43
Discharge: HOME | End: 2023-03-17
Payer: COMMERCIAL

## 2023-03-17 ENCOUNTER — OFFICE VISIT (OUTPATIENT)
Dept: FAMILY MEDICINE | Facility: CLINIC | Age: 43
End: 2023-03-17
Payer: COMMERCIAL

## 2023-03-17 VITALS
BODY MASS INDEX: 25.62 KG/M2 | HEART RATE: 78 BPM | SYSTOLIC BLOOD PRESSURE: 82 MMHG | OXYGEN SATURATION: 98 % | RESPIRATION RATE: 16 BRPM | TEMPERATURE: 97.8 F | DIASTOLIC BLOOD PRESSURE: 68 MMHG | WEIGHT: 130.5 LBS | HEIGHT: 60 IN

## 2023-03-17 DIAGNOSIS — Z13.220 SCREENING FOR HYPERLIPIDEMIA: ICD-10-CM

## 2023-03-17 DIAGNOSIS — N76.0 BV (BACTERIAL VAGINOSIS): ICD-10-CM

## 2023-03-17 DIAGNOSIS — M79.644 PAIN OF RIGHT THUMB: ICD-10-CM

## 2023-03-17 DIAGNOSIS — N89.8 VAGINAL DISCHARGE: ICD-10-CM

## 2023-03-17 DIAGNOSIS — B96.89 BV (BACTERIAL VAGINOSIS): ICD-10-CM

## 2023-03-17 DIAGNOSIS — Z00.00 ROUTINE GENERAL MEDICAL EXAMINATION AT A HEALTH CARE FACILITY: Primary | ICD-10-CM

## 2023-03-17 DIAGNOSIS — I95.9 HYPOTENSION, UNSPECIFIED HYPOTENSION TYPE: ICD-10-CM

## 2023-03-17 DIAGNOSIS — Z23 ENCOUNTER FOR IMMUNIZATION: ICD-10-CM

## 2023-03-17 DIAGNOSIS — Z86.32 HISTORY OF GESTATIONAL DIABETES: ICD-10-CM

## 2023-03-17 LAB
ANION GAP SERPL CALCULATED.3IONS-SCNC: 11 MMOL/L (ref 7–15)
BUN SERPL-MCNC: 9.6 MG/DL (ref 6–20)
CALCIUM SERPL-MCNC: 9 MG/DL (ref 8.6–10)
CHLORIDE SERPL-SCNC: 106 MMOL/L (ref 98–107)
CHOLEST SERPL-MCNC: 122 MG/DL
CLUE CELLS: PRESENT
CREAT SERPL-MCNC: 0.57 MG/DL (ref 0.51–0.95)
DEPRECATED HCO3 PLAS-SCNC: 22 MMOL/L (ref 22–29)
GFR SERPL CREATININE-BSD FRML MDRD: >90 ML/MIN/1.73M2
GLUCOSE SERPL-MCNC: 86 MG/DL (ref 70–99)
HBA1C MFR BLD: 5.5 % (ref 0–5.6)
HDLC SERPL-MCNC: 41 MG/DL
LDLC SERPL CALC-MCNC: 67 MG/DL
NONHDLC SERPL-MCNC: 81 MG/DL
POTASSIUM SERPL-SCNC: 4.2 MMOL/L (ref 3.4–5.3)
SODIUM SERPL-SCNC: 139 MMOL/L (ref 136–145)
TRICHOMONAS, WET PREP: ABNORMAL
TRIGL SERPL-MCNC: 68 MG/DL
WBC'S/HIGH POWER FIELD, WET PREP: ABNORMAL
YEAST, WET PREP: ABNORMAL

## 2023-03-17 PROCEDURE — 87210 SMEAR WET MOUNT SALINE/INK: CPT

## 2023-03-17 PROCEDURE — 73130 X-RAY EXAM OF HAND: CPT | Mod: RT

## 2023-03-17 PROCEDURE — 36415 COLL VENOUS BLD VENIPUNCTURE: CPT

## 2023-03-17 PROCEDURE — 83036 HEMOGLOBIN GLYCOSYLATED A1C: CPT

## 2023-03-17 PROCEDURE — 90471 IMMUNIZATION ADMIN: CPT

## 2023-03-17 PROCEDURE — 99396 PREV VISIT EST AGE 40-64: CPT | Mod: 25

## 2023-03-17 PROCEDURE — 99213 OFFICE O/P EST LOW 20 MIN: CPT | Mod: 25

## 2023-03-17 PROCEDURE — 80048 BASIC METABOLIC PNL TOTAL CA: CPT

## 2023-03-17 PROCEDURE — 80061 LIPID PANEL: CPT

## 2023-03-17 PROCEDURE — 90746 HEPB VACCINE 3 DOSE ADULT IM: CPT

## 2023-03-17 RX ORDER — METRONIDAZOLE 500 MG/1
500 TABLET ORAL 2 TIMES DAILY
Qty: 14 TABLET | Refills: 0 | Status: SHIPPED | OUTPATIENT
Start: 2023-03-17 | End: 2023-03-24

## 2023-03-17 ASSESSMENT — ENCOUNTER SYMPTOMS
HEMATURIA: 0
CHILLS: 0
HEADACHES: 0
WEAKNESS: 0
EYE PAIN: 0
DIARRHEA: 0
BREAST MASS: 0
JOINT SWELLING: 0
HEARTBURN: 0
SHORTNESS OF BREATH: 0
FEVER: 0
CONSTIPATION: 0
HEMATOCHEZIA: 0
PARESTHESIAS: 0
SORE THROAT: 0
DYSURIA: 0
ARTHRALGIAS: 0
ABDOMINAL PAIN: 0
DIZZINESS: 0
NERVOUS/ANXIOUS: 0
FREQUENCY: 0
NAUSEA: 0
PALPITATIONS: 0
COUGH: 0
MYALGIAS: 0

## 2023-03-17 ASSESSMENT — PAIN SCALES - GENERAL: PAINLEVEL: NO PAIN (0)

## 2023-03-17 NOTE — PROGRESS NOTES
met   SUBJECTIVE:   CC: Liam is an 43 year old who presents for preventive health visit.   Patient has been advised of split billing requirements and indicates understanding: Yes     Healthy Habits:     Getting at least 3 servings of Calcium per day:  Yes    Bi-annual eye exam:  NO    Dental care twice a year:  Yes    Sleep apnea or symptoms of sleep apnea:  None    Diet:  Regular (no restrictions)    Frequency of exercise:  None    Taking medications regularly:  Yes    Medication side effects:  Other    PHQ-2 Total Score: 0    Additional concerns today:  No    Today's PHQ-2 Score:   PHQ-2 ( 1999 Pfizer) 3/17/2023   Q1: Little interest or pleasure in doing things 0   Q2: Feeling down, depressed or hopeless 0   PHQ-2 Score 0   PHQ-2 Total Score (12-17 Years)- Positive if 3 or more points; Administer PHQ-A if positive -   Q1: Little interest or pleasure in doing things Not at all   Q2: Feeling down, depressed or hopeless Not at all   PHQ-2 Score 0     Social History     Tobacco Use     Smoking status: Never     Smokeless tobacco: Never   Substance Use Topics     Alcohol use: No     Alcohol/week: 0.0 standard drinks     Alcohol Use 3/17/2023   Prescreen: >3 drinks/day or >7 drinks/week? No     Reviewed orders with patient.  Reviewed health maintenance and updated orders accordingly - Yes    Breast Cancer Screening:    Breast CA Risk Assessment (FHS-7) 5/11/2021   Do you have a family history of breast, colon, or ovarian cancer? No / Unknown     Mammogram Screening - Offered annual screening and updated Health Maintenance for mutual plan based on risk factor consideration    Pertinent mammograms are reviewed under the imaging tab.    History of abnormal Pap smear: NO - age 30-65 PAP every 5 years with negative HPV co-testing recommended  PAP / HPV Latest Ref Rng & Units 4/29/2022 10/4/2019 6/21/2019   PAP   Negative for Intraepithelial Lesion or Malignancy (NILM) - -   PAP (Historical) - - NIL NIL   HPV16 Negative  Negative - Negative   HPV18 Negative Negative - Negative   HRHPV Negative Negative - Positive(A)     Reviewed and updated as needed this visit by clinical staff   Tobacco  Allergies  Meds            Reviewed and updated as needed this visit by Provider         Attila Alva           Review of Systems   Constitutional: Negative for chills and fever.   HENT: Negative for congestion, ear pain, hearing loss and sore throat.    Eyes: Negative for pain and visual disturbance.   Respiratory: Negative for cough and shortness of breath.    Cardiovascular: Negative for chest pain, palpitations and peripheral edema.   Gastrointestinal: Negative for abdominal pain, constipation, diarrhea, heartburn, hematochezia and nausea.   Breasts:  Negative for tenderness, breast mass and discharge.   Genitourinary: Positive for vaginal discharge. Negative for dysuria, frequency, genital sores, hematuria, pelvic pain, urgency and vaginal bleeding.   Musculoskeletal: Negative for arthralgias, joint swelling and myalgias.   Skin: Negative for rash.   Neurological: Negative for dizziness, weakness, headaches and paresthesias.   Psychiatric/Behavioral: Negative for mood changes. The patient is not nervous/anxious.      41/2 month old at home, 5 year son. 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     OBJECTIVE:   BP (!) 82/68 (BP Location: Right arm, Patient Position: Sitting, Cuff Size: Adult Regular)   Pulse 78   Temp 97.8  F (36.6  C) (Oral)   Resp 16   Ht 1.524 m (5')   Wt 59.2 kg (130 lb 8 oz)   SpO2 98%   BMI 25.49 kg/m    Physical Exam  Constitutional:       General: She is not in acute distress.  HENT:      Right Ear: Tympanic membrane, ear canal and external ear normal.      Left Ear: Tympanic membrane, ear canal and external ear normal.   Eyes:      Extraocular Movements: Extraocular movements intact.      Pupils: Pupils are equal, round, and reactive to light.   Cardiovascular:      Rate and Rhythm: Normal rate and regular rhythm.       Pulses: Normal pulses.      Heart sounds: Normal heart sounds. No murmur heard.  Pulmonary:      Effort: Pulmonary effort is normal. No respiratory distress.      Breath sounds: No wheezing.   Abdominal:      General: Abdomen is flat. Bowel sounds are normal.   Musculoskeletal:         General: Normal range of motion.      Right hand: Tenderness present. No swelling or bony tenderness. Normal range of motion. Decreased strength of thumb/finger opposition. Normal strength of finger abduction and wrist extension. Normal sensation. Normal pulse.      Cervical back: Normal range of motion. No rigidity.      Right lower leg: No edema.      Left lower leg: No edema.   Lymphadenopathy:      Cervical: No cervical adenopathy.   Skin:     General: Skin is warm and dry.   Neurological:      General: No focal deficit present.      Mental Status: She is alert.      Cranial Nerves: No cranial nerve deficit.      Sensory: No sensory deficit.      Motor: No weakness.      Gait: Gait normal.   Psychiatric:         Mood and Affect: Mood normal.         Thought Content: Thought content normal.       ASSESSMENT/PLAN:   Routine general medical examination at a health care facility  Patient presents for annual preventative care visit. She has concerns with diabetes, thumb pain and vaginal discharge. No acute findings on exam. Last PAP was  August 2022 NILM and negative HPV testing. She is not due for mammogram screening at this time, not due for colon cancer screening at this time. 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 She does not smoke or drink.   - REVIEW OF HEALTH MAINTENANCE PROTOCOL ORDERS  - Adult Eye  Referral; Future  - Basic metabolic panel  (Ca, Cl, CO2, Creat, Gluc, K, Na, BUN)    Screening for hyperlipidemia  Due, ordered and completed today.   - Lipid Profile (Chol, Trig, HDL, LDL calc)    Recent Labs   Lab Test 03/17/23  1137 05/11/21  1219 06/08/18  1214 09/25/15  0844   CHOL 122 137   < > 105    HDL 41* 62   < > 46*   LDL 67 63   < > 52   TRIG 68 60   < > 36   CHOLHDLRATIO  --   --   --  2.3    < > = values in this interval not displayed.     History of gestational diabetes  Patient reports gestational diabetes with her first child, but not with her second. Would like diabetes screening completed today.  - Hemoglobin A1c    Pain of right thumb  Chronic pain with weakness reported in her right thumb. Will image today and refer to orthopedics as needed. No numbness or tingling, CMS intact, pain is reported with palpation of the palm below the thumb. No specific MCP joint tenderness, no snuff box tenderness. Full ROM. Some weakness noted with opposition testing.  - XR Hand Right G/E 3 Views; Future    Vaginal discharge  BV (bacterial vaginosis)  Patient reports vaginal discharge. Clue cells on wet prep. Confirmed she is not breast feeding. Ordered metronidazole metroNIDAZOLE (FLAGYL) 500 MG tablet; Take 1 tablet (500 mg) by mouth 2 times daily for 7 days  - Wet prep - Clinic Collect    Encounter for immunization  Discussed recommendation, patient works in healthcare. She would like to receive adult vaccination. Started today.  - HEPATITIS B VACCINE ADULT 3 DOSE IM (ENGERIX-B/RECOMBIVAX HB)    Hypotension, unspecified hypotension type  BP today 82/68. Patient reports this is chronic for her and at the dentist this week it was lower. Asymptomatic.     BP Readings from Last 6 Encounters:   03/17/23 (!) 82/68   12/06/22 101/73   11/02/22 114/80   10/29/22 108/76   10/11/22 120/74   10/05/22 96/63     Patient has been advised of split billing requirements and indicates understanding: Yes    COUNSELING:  Reviewed preventive health counseling, as reflected in patient instructions       Regular exercise       Healthy diet/nutrition    She reports that she has never smoked. She has never used smokeless tobacco.    At the end of the visit, I confirmed understanding of what was discussed. Patient has no further  questions or concerns that were brought up at this time.     Reanna Rodriguez, JIM, APRN, FNP-C

## 2023-03-17 NOTE — PATIENT INSTRUCTIONS
You will need your next hepatitis B vaccine in 1 month and another in 6 months.       Preventive Health Recommendations  Female Ages 40 to 49    Yearly exam:   See your health care provider every year in order to  Review health changes.   Discuss preventive care.    Review your medicines if your doctor prescribed any.    Get a Pap test every three years (unless you have an abnormal result and your provider advises testing more often).    If you get Pap tests with HPV test, you only need to test every 5 years, unless you have an abnormal result. You do not need a Pap test if your uterus was removed (hysterectomy) and you have not had cancer.    You should be tested each year for STDs (sexually transmitted diseases), if you're at risk.   Ask your doctor if you should have a mammogram.    Have a colonoscopy (test for colon cancer) if someone in your family has had colon cancer or polyps before age 50.     Have a cholesterol test every 5 years.     Have a diabetes test (fasting glucose) after age 45. If you are at risk for diabetes, you should have this test every 3 years.    Shots: Get a flu shot each year. Get a tetanus shot every 10 years.     Nutrition:   Eat at least 5 servings of fruits and vegetables each day.  Eat whole-grain bread, whole-wheat pasta and brown rice instead of white grains and rice.  Get adequate Calcium and Vitamin D.      Lifestyle  Exercise at least 150 minutes a week (an average of 30 minutes a day, 5 days a week). This will help you control your weight and prevent disease.  Limit alcohol to one drink per day.  No smoking.   Wear sunscreen to prevent skin cancer.  See your dentist every six months for an exam and cleaning.

## 2023-04-14 ENCOUNTER — OFFICE VISIT (OUTPATIENT)
Dept: PODIATRY | Facility: CLINIC | Age: 43
End: 2023-04-14
Payer: COMMERCIAL

## 2023-04-14 VITALS
SYSTOLIC BLOOD PRESSURE: 96 MMHG | DIASTOLIC BLOOD PRESSURE: 65 MMHG | WEIGHT: 130 LBS | HEART RATE: 102 BPM | BODY MASS INDEX: 25.52 KG/M2 | HEIGHT: 60 IN

## 2023-04-14 DIAGNOSIS — L60.0 INGROWN NAIL OF GREAT TOE OF LEFT FOOT: ICD-10-CM

## 2023-04-14 DIAGNOSIS — M72.2 PLANTAR FASCIITIS, RIGHT: Primary | ICD-10-CM

## 2023-04-14 DIAGNOSIS — M72.2 PLANTAR FASCIITIS, LEFT: ICD-10-CM

## 2023-04-14 PROCEDURE — 11750 EXCISION NAIL&NAIL MATRIX: CPT | Mod: TA | Performed by: PODIATRIST

## 2023-04-14 PROCEDURE — 99203 OFFICE O/P NEW LOW 30 MIN: CPT | Mod: 25 | Performed by: PODIATRIST

## 2023-04-14 ASSESSMENT — PAIN SCALES - GENERAL: PAINLEVEL: SEVERE PAIN (7)

## 2023-04-14 NOTE — PROGRESS NOTES
Liam Ellis is a 43 year old female who presents with a chief complaint of a painful ingrown toenail to the left great toe.  The patient relates pain when wearing shoes.  The patient denies any redness extending up the big toe into the foot.  The patient relates the condition has been getting worse over the past several weeks.  The patient also relates having chronic heel pain to both heels over the past several months.  The patient relates trying different shoes with no relief.       Pertinent medical, surgical and family history was reviewed in the chart.    Vitals: BP 96/65   Pulse 102   Ht 1.524 m (5')   Wt 59 kg (130 lb)   BMI 25.39 kg/m    BMI= Body mass index is 25.39 kg/m .    LOWER EXTREMITY PHYSICAL EXAM    Dermatologic: One notes an inflamed nail border of the left great toe.  There is noted erythema and edema located around the labial fold and does not extend past the interphalangeal joint.  There is no apparent purulent drainage noted.  There is pain on palpation to the proximal aspect of the nail border.  Otherwise, the skin is intact to both lower extremities without significant lesions, rash or abrasion.           Vascular: DP & PT pulses are intact & regular on the left.   CFT and skin temperature is normal to the left lower extremities.     Neurologic: Lower extremity sensation is intact to light touch.  No evidence of weakness in the left lower extremities.        Musculoskeletal: Patient is ambulatory without assistive device or brace.  No gross ankle deformity noted.  No foot or ankle joint effusion is noted.   Noted pain on palpation on the plantar aspect of the right and left heel near the insertion point of the plantar fascia.  No surrounding erythema or edema noted.  Noted tight gastroc complex bilaterally.      ASSESSMENT / PLAN:     ICD-10-CM    1. Plantar fasciitis, right  M72.2       2. Plantar fasciitis, left  M72.2       3. Ingrown nail of great toe of left foot  L60.0            Plan:  I have explained to Liam about the condition.  The potential causes and nature of an ingrown toenail were discussed with the patient.  We reviewed the natural history and prognosis of the condition and potential risks if no treatment is provided.  Treatment options discussed included conservative management (oral antibiotics, soaking of foot, adequate width shoes)  as well as surgical management (partial or total nail removal).  The pros and cons of both forms as well as risks and benefits of treatment were reviewed.       At this point, I recommended having the offending nail border permanently removed from the left great toe.  I have explained to the patient all of the possible risks, benefits, alternatives to the procedure.  The patient consented to the proposed procedure.  The left hallux was swabbed with alcohol.  Next, approximately 3 cc of 1% lidocaine plain was injected around the left hallux.  The left hallux was then prepped with Betadine ointment.  A tourniquet was applied to the left hallux.  A [place] elevator was utilized to free up the eponychium of the offending nail border.  Next, the offending nail border was split using an English anvil back to the matrix.  Next, utilizing a straight hemostat, the offending nail border was avulsed in toto.  The wound bed was debrided on any remaining nail and hyperkeratotic skin.  Next, the offending nail matrix was treated with 89% phenol using microtip cotton applicators for 30 seconds.  The wound was then irrigated and dressed with bacitracin antibiotic ointment and a compressive  sterile dressing.  The tourniquet was removed and a prompt hyperemic response was noted.  The patient tolerated the procedure well with no complications.  The patient was given post procedure instructions for the care of the wound.  The patient was informed that it is common to experience redness with watery drainage coming from the treated areas of the toe related to the  phenol application.  The patient will return in two weeks for reevaluation and was instructed to notify the office if any redness extending past the big toe joint or fever with chills are experienced before then.      There is low risk of morbidity with the procedure.  There was no overlap in work associated with the evaluation/management and the work associated with the procedure.    the patient was educated on the importance of offloading supportive shoes and other devices.  I demonstrated to the patient calf stretches to perform every hour daily until symptoms resolve.  After symptoms resolve, the patient was advised to perform the stretches 3 times daily to prevent future recurrence.  The patient was instructed to perform warm soaks with Epson salt after which to also apply over-the-counter Voltaren gel to deeply massage the injured tissue.  The patient was instructed to do this on a daily basis until symptoms resolve.  The patient was prescribed custom orthotics that will aid in offloading the tension forces to the soft tissues and prevent further inflammation.   The patient may return in four weeks for reevaluation to determine if any further treatment will be needed.    Liam verbalized agreement with and understanding of the rational for the diagnosis and treatment plan.  All questions were answered to best of my ability and the patient's satisfaction. The patient was advised to contact the clinic with any questions that may arise after the clinic visit.      Disclaimer: This note consists of symbols derived from keyboarding, dictation and/or voice recognition software. As a result, there may be errors in the script that have gone undetected. Please consider this when interpreting information found in this chart.      THUY Scott D.P.M., [email or web address].

## 2023-04-14 NOTE — LETTER
4/14/2023         RE: Liam Ellis  [address] [place] [address]        Dear Colleague,    Thank you for referring your patient, Liam Ellis, to the [facility]. Please see a copy of my visit note below.    Liam Ellis is a 43 year old female who presents with a chief complaint of a painful ingrown toenail to the left great toe.  The patient relates pain when wearing shoes.  The patient denies any redness extending up the big toe into the foot.  The patient relates the condition has been getting worse over the past several weeks.  The patient also relates having chronic heel pain to both heels over the past several months.  The patient relates trying different shoes with no relief.       Pertinent medical, surgical and family history was reviewed in the chart.    Vitals: BP 96/65   Pulse 102   Ht 1.524 m (5')   Wt 59 kg (130 lb)   BMI 25.39 kg/m    BMI= Body mass index is 25.39 kg/m .    LOWER EXTREMITY PHYSICAL EXAM    Dermatologic: One notes an inflamed nail border of the left great toe.  There is noted erythema and edema located around the labial fold and does not extend past the interphalangeal joint.  There is no apparent purulent drainage noted.  There is pain on palpation to the proximal aspect of the nail border.  Otherwise, the skin is intact to both lower extremities without significant lesions, rash or abrasion.           Vascular: DP & PT pulses are intact & regular on the left.   CFT and skin temperature is normal to the left lower extremities.     Neurologic: Lower extremity sensation is intact to light touch.  No evidence of weakness in the left lower extremities.        Musculoskeletal: Patient is ambulatory without assistive device or brace.  No gross ankle deformity noted.  No foot or ankle joint effusion is noted.   Noted pain on palpation on the plantar aspect of the right and left heel near the insertion point of the plantar fascia.  No  surrounding erythema or edema noted.  Noted tight gastroc complex bilaterally.      ASSESSMENT / PLAN:     ICD-10-CM    1. Plantar fasciitis, right  M72.2       2. Plantar fasciitis, left  M72.2       3. Ingrown nail of great toe of left foot  L60.0           Plan:  I have explained to Liam about the condition.  The potential causes and nature of an ingrown toenail were discussed with the patient.  We reviewed the natural history and prognosis of the condition and potential risks if no treatment is provided.  Treatment options discussed included conservative management (oral antibiotics, soaking of foot, adequate width shoes)  as well as surgical management (partial or total nail removal).  The pros and cons of both forms as well as risks and benefits of treatment were reviewed.       At this point, I recommended having the offending nail border permanently removed from the left great toe.  I have explained to the patient all of the possible risks, benefits, alternatives to the procedure.  The patient consented to the proposed procedure.  The left hallux was swabbed with alcohol.  Next, approximately 3 cc of 1% lidocaine plain was injected around the left hallux.  The left hallux was then prepped with Betadine ointment.  A tourniquet was applied to the left hallux.  A [place] elevator was utilized to free up the eponychium of the offending nail border.  Next, the offending nail border was split using an English anvil back to the matrix.  Next, utilizing a straight hemostat, the offending nail border was avulsed in toto.  The wound bed was debrided on any remaining nail and hyperkeratotic skin.  Next, the offending nail matrix was treated with 89% phenol using microtip cotton applicators for 30 seconds.  The wound was then irrigated and dressed with bacitracin antibiotic ointment and a compressive  sterile dressing.  The tourniquet was removed and a prompt hyperemic response was noted.  The patient tolerated the  procedure well with no complications.  The patient was given post procedure instructions for the care of the wound.  The patient was informed that it is common to experience redness with watery drainage coming from the treated areas of the toe related to the phenol application.  The patient will return in two weeks for reevaluation and was instructed to notify the office if any redness extending past the big toe joint or fever with chills are experienced before then.      There is low risk of morbidity with the procedure.  There was no overlap in work associated with the evaluation/management and the work associated with the procedure.    the patient was educated on the importance of offloading supportive shoes and other devices.  I demonstrated to the patient calf stretches to perform every hour daily until symptoms resolve.  After symptoms resolve, the patient was advised to perform the stretches 3 times daily to prevent future recurrence.  The patient was instructed to perform warm soaks with Epson salt after which to also apply over-the-counter Voltaren gel to deeply massage the injured tissue.  The patient was instructed to do this on a daily basis until symptoms resolve.  The patient was prescribed custom orthotics that will aid in offloading the tension forces to the soft tissues and prevent further inflammation.   The patient may return in four weeks for reevaluation to determine if any further treatment will be needed.    Liam verbalized agreement with and understanding of the rational for the diagnosis and treatment plan.  All questions were answered to best of my ability and the patient's satisfaction. The patient was advised to contact the clinic with any questions that may arise after the clinic visit.      Disclaimer: This note consists of symbols derived from keyboarding, dictation and/or voice recognition software. As a result, there may be errors in the script that have gone undetected. Please  consider this when interpreting information found in this chart.      THUY Scott D.P.M., F.PHILIP.C.F.A.S.        Again, thank you for allowing me to participate in the care of your patient.        Sincerely,        Antonio Scott DPM

## 2023-04-14 NOTE — NURSING NOTE
Chief Complaint   Patient presents with     Consult     Bilateral heel pain     Ingrown Toenail     Left great toenail       Initial BP 96/65   Pulse 102   Ht 1.524 m (5')   Wt 59 kg (130 lb)   BMI 25.39 kg/m   Estimated body mass index is 25.39 kg/m  as calculated from the following:    Height as of this encounter: 1.524 m (5').    Weight as of this encounter: 59 kg (130 lb).  Medications and allergies reviewed.      Amanda YOUNG MA

## 2023-04-14 NOTE — PATIENT INSTRUCTIONS
Post toenail procedure instructions:    Keep bandages on for the rest of the day; take off this evening.  Soak the foot and toe in warm water with Epsom's salt or Dreft detergent for 20 min.  Clean the toe and the treated area with a soapy wash cloth.  Apply a topical antibiotic (Bacitracin) to the treated area and cover with a Band-Aid  Repeat this morning and night for two weeks, or until the treated are resolves any redness or drainage.  Redness and drainage is normal when treated with the Phenol acid.  Notify the office if there is any redness extending up the foot or purulent drainage noted.    Any discomfort should be treated with either Ibuprofen (Advil) or Tylenol.  Please follow package instructions on amount to be taken.      Next Steps:      Support:  Wear supportive shoes, sandals, boots and/or inserts that have a rigid supportive sole.    This will offload the majority of tension forces that travel through your feet every step you take.    Rekoo Max Cushioning Elite/Premier   Rekoo Relax Fit D'Lux Walker  Reebok Walk Ultra 7 DMX MAX   Hoka Bondi walking shoes  Syrenaica shoes/slippers (https://Contractually.[employer])  Optony sandals (https://www.Perfect Earth.Patient Conversation Media/us)  Superfeet inserts (www.Codeanywhereeet.com)  It is important that you also wear supportive shoe wear in the house to continue providing support to your feet.    You may always use a cushioned liner for your shoes if that makes your feet feel better.  Stretching  Calf stretching is essential to offload the tension forces that travel through your feet every step you take  Preferred calf stretch is the Runner's Stretch  Place one foot behind the other foot, flat against the ground (it is important to keep the heel on the ground).  The back leg is the one that will be stretched.  Start with the knee straight and lean your hips into the wall, counter or whatever you are leaning into - count to ten.  Next, bend the knee.  You should feel the  stretch lower in the calf muscle - count to ten.  Repeat this stretch once an hour to start off with.  When symptoms subside, I recommend performing the stretch 3 times daily to prevent any future problems.                Tissue Massage  It is important that you physically loosen the inflammation tissue to help your body heal the injured tissue.  I recommend soaking your foot in warm water to increase the microcirculation to the soft tissues.  You may add Epson salt to the water if you prefer.  You may apply an over-the-counter muscle rub, such as Voltaren gel, and deeply massage the injured tissue.  Reduce Inflammation  You can ice the injured tissue with an ice pack with a light cloth covering or soaking in ice water 20 minutes to reduce any acute inflammation, typically at the end of the day.      It is important to understand that most problems that develop in the foot and ankle are caused by excessive tension that cause microinjury to the soft tissues and inflammation in the foot and ankle.  By addressing the underlying causes with support and stretching as well as treating the current inflammatory conditions with tissue massage and anti-inflammatory treatments, most foot and ankle musculoskeletal conditions will resolve.  This may take time to heal.  However, if symptoms persist past 4 weeks you should return to the office for reevaluation to determine further treatment options.

## 2023-04-17 ENCOUNTER — OFFICE VISIT (OUTPATIENT)
Dept: OPTOMETRY | Facility: CLINIC | Age: 43
End: 2023-04-17
Payer: COMMERCIAL

## 2023-04-17 DIAGNOSIS — Z01.00 ROUTINE EYE EXAM: Primary | ICD-10-CM

## 2023-04-17 DIAGNOSIS — H04.123 DRY EYES: ICD-10-CM

## 2023-04-17 DIAGNOSIS — H52.4 PRESBYOPIA: ICD-10-CM

## 2023-04-17 PROCEDURE — 92004 COMPRE OPH EXAM NEW PT 1/>: CPT | Performed by: OPTOMETRIST

## 2023-04-17 PROCEDURE — 92015 DETERMINE REFRACTIVE STATE: CPT | Performed by: OPTOMETRIST

## 2023-04-17 ASSESSMENT — CONF VISUAL FIELD
OS_INFERIOR_TEMPORAL_RESTRICTION: 0
OS_SUPERIOR_TEMPORAL_RESTRICTION: 0
OD_INFERIOR_TEMPORAL_RESTRICTION: 0
OD_INFERIOR_NASAL_RESTRICTION: 0
METHOD: COUNTING FINGERS
OS_SUPERIOR_NASAL_RESTRICTION: 0
OS_INFERIOR_NASAL_RESTRICTION: 0
OD_NORMAL: 1
OS_NORMAL: 1
OD_SUPERIOR_NASAL_RESTRICTION: 0
OD_SUPERIOR_TEMPORAL_RESTRICTION: 0

## 2023-04-17 ASSESSMENT — SLIT LAMP EXAM - LIDS
COMMENTS: NORMAL
COMMENTS: NORMAL

## 2023-04-17 ASSESSMENT — REFRACTION_MANIFEST
OD_SPHERE: PLANO
OS_CYLINDER: SPHERE
OS_CYLINDER: SPHERE
OD_CYLINDER: SPHERE
OD_ADD: +1.75
OS_ADD: +1.75
OS_SPHERE: PLANO
OD_CYLINDER: SPHERE
OS_SPHERE: PLANO
OD_SPHERE: +0.25

## 2023-04-17 ASSESSMENT — EXTERNAL EXAM - RIGHT EYE: OD_EXAM: NORMAL

## 2023-04-17 ASSESSMENT — KERATOMETRY
OS_K2POWER_DIOPTERS: 41.00
OD_AXISANGLE_DEGREES: 090
OS_K1POWER_DIOPTERS: 40.00
OS_AXISANGLE_DEGREES: 090
OS_AXISANGLE2_DEGREES: 180
OD_AXISANGLE2_DEGREES: 180
OD_K1POWER_DIOPTERS: 40.25
OD_K2POWER_DIOPTERS: 40.75

## 2023-04-17 ASSESSMENT — VISUAL ACUITY
OS_SC: 20/20
OS_SC+: -1
OD_SC: 20/20
OD_SC+: -1
METHOD: SNELLEN - LINEAR
OD_SC: J1
OS_SC: J2

## 2023-04-17 ASSESSMENT — TONOMETRY
OD_IOP_MMHG: 11
IOP_METHOD: APPLANATION
OS_IOP_MMHG: 11

## 2023-04-17 ASSESSMENT — EXTERNAL EXAM - LEFT EYE: OS_EXAM: NORMAL

## 2023-04-17 ASSESSMENT — CUP TO DISC RATIO
OD_RATIO: 0.3
OS_RATIO: 0.3

## 2023-04-17 NOTE — LETTER
4/17/2023         RE: Liam Ellis  [address]  [place] [facility] [address]        Dear Colleague,    Thank you for referring your patient, Liam Ellis, to the [facility]. Please see a copy of my visit note below.    Chief Complaint   Patient presents with     Annual Eye Exam           Last Eye Exam: May 2018  Dilated Previously: Yes, side effects of dilation explained today    What are you currently using to see?  Readers +1.00     Distance Vision Acuity: Noticed gradual change in both eyes - vision can seem blurry at times. She noticed after pregnancy, her vision has changed a lot.     Near Vision Acuity: Noticed more trouble reading in dim light    Eye Comfort: itchy  Do you use eye drops? 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       OBJECTIVE: See Ophthalmology exam    ASSESSMENT:    ICD-10-CM    1. Routine eye exam  Z01.00       2. Presbyopia  H52.4       3. Dry eyes  H04.123           PLAN:     Patient Instructions   Use over the counter readers +1.00 for computer and +1.75 for reading   If your eyes are dry it can cause smeary or blurry vision that changes with the blink  Use over the counter artificial tears 2 times a day (Thera Tears , SystaneComplete or Refresh Optive)   Return in 1 year for eye exam    Pam Knutson O.D.  [facility]  [address] [place], [place] [id number] [phone]            Again, thank you for allowing me to participate in the care of your patient.        Sincerely,        Pam Knutson, OD

## 2023-04-17 NOTE — PROGRESS NOTES
Chief Complaint   Patient presents with     Annual Eye Exam           Last Eye Exam: May 2018  Dilated Previously: Yes, side effects of dilation explained today    What are you currently using to see?  Readers +1.00     Distance Vision Acuity: Noticed gradual change in both eyes - vision can seem blurry at times. She noticed after pregnancy, her vision has changed a lot.     Near Vision Acuity: Noticed more trouble reading in dim light    Eye Comfort: itchy  Do you use eye drops? 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       OBJECTIVE: See Ophthalmology exam    ASSESSMENT:    ICD-10-CM    1. Routine eye exam  Z01.00       2. Presbyopia  H52.4       3. Dry eyes  H04.123           PLAN:     Patient Instructions   Use over the counter readers +1.00 for computer and +1.75 for reading   If your eyes are dry it can cause smeary or blurry vision that changes with the blink  Use over the counter artificial tears 2 times a day (Thera Tears , SystaneComplete or Refresh Optive)   Return in 1 year for eye exam    Pam Knutson O.D.  [facility]  [address] [id number] [phone]

## 2023-04-17 NOTE — PATIENT INSTRUCTIONS
Use over the counter readers +1.00 for computer and +1.75 for reading   If your eyes are dry it can cause smeary or blurry vision that changes with the blink  Use over the counter artificial tears 2 times a day (Thera Tears , SystaneComplete or Refresh Optive)   Return in 1 year for eye exam    Pam Knutson O.D.  [facility]  [address] [place] [address] [phone]

## 2023-04-28 ENCOUNTER — ALLIED HEALTH/NURSE VISIT (OUTPATIENT)
Dept: FAMILY MEDICINE | Facility: CLINIC | Age: 43
End: 2023-04-28
Payer: COMMERCIAL

## 2023-04-28 DIAGNOSIS — Z23 NEED FOR VACCINATION: Primary | ICD-10-CM

## 2023-04-28 DIAGNOSIS — Z23 ENCOUNTER FOR IMMUNIZATION: ICD-10-CM

## 2023-04-28 PROCEDURE — 90471 IMMUNIZATION ADMIN: CPT

## 2023-04-28 PROCEDURE — 99207 PR NO CHARGE NURSE ONLY: CPT

## 2023-04-28 PROCEDURE — 90746 HEPB VACCINE 3 DOSE ADULT IM: CPT

## 2023-04-28 NOTE — PROGRESS NOTES
Prior to immunization administration, verified patients identity using patient s name and date of birth. Please see Immunization Activity for additional information.     Screening Questionnaire for Adult Immunization    Are you sick today?   No   Do you have allergies to medications, food, a vaccine component or latex?   No   Have you ever had a serious reaction after receiving a vaccination?   No   Do you have a long-term health problem with heart, lung, kidney, or metabolic disease (e.g., diabetes), asthma, a blood disorder, no spleen, complement component deficiency, a cochlear implant, or a spinal fluid leak?  Are you on long-term aspirin therapy?   No   Do you have cancer, leukemia, HIV/AIDS, or any other immune system problem?   No   Do you have a parent, brother, or sister with an immune system problem?   No   In the past 3 months, have you taken medications that affect  your immune system, such as prednisone, other steroids, or anticancer drugs; drugs for the treatment of rheumatoid arthritis, Crohn s disease, or psoriasis; or have you had radiation treatments?   No   Have you had a seizure, or a brain or other nervous system problem?   No   During the past year, have you received a transfusion of blood or blood    products, or been given immune (gamma) globulin or antiviral drug?   No   For women: Are you pregnant or is there a chance you could become       pregnant during the next month?   No   Have you received any vaccinations in the past 4 weeks?   No     Immunization questionnaire answers were all negative.    I have reviewed the following standing orders:   This patient is due and qualifies for the Hepatitis B vaccine.    Click here for Hepatitis B Standing Order    I have reviewed the vaccines inclusion and exclusion criteria; No concerns regarding eligibility.  - order was already in chart per Maldonado Talley        Injection of Hep B given by Carlota Smith CMA. Patient instructed to remain in  clinic for 15 minutes afterwards, and to report any adverse reactions.     Screening performed by Carlota Smith CMA on 4/28/2023 at 10:56 AM.

## 2023-05-30 ENCOUNTER — OFFICE VISIT (OUTPATIENT)
Dept: ORTHOPEDICS | Facility: CLINIC | Age: 43
End: 2023-05-30
Payer: COMMERCIAL

## 2023-05-30 VITALS — SYSTOLIC BLOOD PRESSURE: 98 MMHG | BODY MASS INDEX: 25.39 KG/M2 | DIASTOLIC BLOOD PRESSURE: 64 MMHG | WEIGHT: 130 LBS

## 2023-05-30 DIAGNOSIS — M65.311 TRIGGER THUMB, RIGHT THUMB: Primary | ICD-10-CM

## 2023-05-30 PROCEDURE — 20550 NJX 1 TENDON SHEATH/LIGAMENT: CPT | Mod: RT | Performed by: PEDIATRICS

## 2023-05-30 PROCEDURE — 99203 OFFICE O/P NEW LOW 30 MIN: CPT | Mod: 25 | Performed by: PEDIATRICS

## 2023-05-30 RX ADMIN — TRIAMCINOLONE ACETONIDE 20 MG: 40 INJECTION, SUSPENSION INTRA-ARTICULAR; INTRAMUSCULAR at 13:15

## 2023-05-30 RX ADMIN — LIDOCAINE HYDROCHLORIDE 0.5 ML: 10 INJECTION, SOLUTION INFILTRATION; PERINEURAL at 13:15

## 2023-05-30 NOTE — PATIENT INSTRUCTIONS
Overall, condition was consistent with right trigger thumb.  Though there is no active triggering currently, the other types of symptoms and location of symptoms is most consistent with trigger thumb.  For this, we discussed considerations around therapy, splinting/support medications, injections, also potential for referral on to see orthopedic surgery.  Following discussion, right trigger thumb steroid injection done today.  Oval 8 splint provided, may use as desired.  Plan to monitor course with injection at least 2 weeks to start.  Contact clinic if any other questions or concerns.    If you have any further questions for your physician or physician s care team you can contact them thru MyChart or by calling  [phone] and use option 3 to leave a voice message.   Messages received during business hours will be returned same day.            Injection Discharge Instructions    You may shower, however avoid swimming, tub baths or hot tubs for 24 hours following your procedure  You may have a mild to moderate increase in pain for several days following the injection.  It may take up to 14 days for the steroid medication to start working although you may feel the effect as early as a few days after the procedure.  You may use ice packs for 10-15 minutes, 3 to 4 times a day at the injection site for comfort  You may use anti-inflammatory medications (such as Ibuprofen or Aleve or Advil) if you are able to take safely, or acetaminophen (Tylenol) for pain control if necessary  If you were fasting, you may resume your normal diet and medications after the procedure  If you have diabetes, check your blood sugar more frequently than usual as your blood sugar may be higher than normal for 10-14 days following a steroid injection. Contact your doctor who manages your diabetes if your blood sugar is higher than usual    If you experience any of the following, call the clinic @ [phone]  - Fever over 100 degree F  -  Swelling, bleeding, redness, drainage, warmth at the injection site  - New or worsening pain

## 2023-05-30 NOTE — LETTER
5/30/2023         RE: Liam Ellis  [address] [facility] [address]        Dear Colleague,    Thank you for referring your patient, Liam Ellis, to the [facility]. Please see a copy of my visit note below.    ASSESSMENT & PLAN    Liam was seen today for pain.    Diagnoses and all orders for this visit:    Trigger thumb, right thumb  -     Hand / Upper Extremity Injection/Arthrocentesis: R thumb A1          See Patient Instructions  Patient Instructions   Overall, condition was consistent with right trigger thumb.  Though there is no active triggering currently, the other types of symptoms and location of symptoms is most consistent with trigger thumb.  For this, we discussed considerations around therapy, splinting/support medications, injections, also potential for referral on to see orthopedic surgery.  Following discussion, right trigger thumb steroid injection done today.  Oval 8 splint provided, may use as desired.  Plan to monitor course with injection at least 2 weeks to start.  Contact clinic if any other questions or concerns.    If you have any further questions for your physician or physician s care team you can contact them thru moduhart or by calling  [phone] and use option 3 to leave a voice message.   Messages received during business hours will be returned same day.            Injection Discharge Instructions      You may shower, however avoid swimming, tub baths or hot tubs for 24 hours following your procedure    You may have a mild to moderate increase in pain for several days following the injection.    It may take up to 14 days for the steroid medication to start working although you may feel the effect as early as a few days after the procedure.    You may use ice packs for 10-15 minutes, 3 to 4 times a day at the injection site for comfort    You may use anti-inflammatory medications (such as Ibuprofen or Aleve or Advil) if you are able  to take safely, or acetaminophen (Tylenol) for pain control if necessary    If you were fasting, you may resume your normal diet and medications after the procedure    If you have diabetes, check your blood sugar more frequently than usual as your blood sugar may be higher than normal for 10-14 days following a steroid injection. Contact your doctor who manages your diabetes if your blood sugar is higher than usual      If you experience any of the following, call the clinic @ [phone]  - Fever over 100 degree F  - Swelling, bleeding, redness, drainage, warmth at the injection site  - New or worsening pain        Mik Garcia DO  [facility]    -----  Chief Complaint   Patient presents with     Right Hand - Pain       SUBJECTIVE  Liam Ellis is a/an 43 year old female who is seen as a self referral for evaluation of right hand pain.     The patient is seen by themselves.  The patient is Right handed    Onset: 7 month(s) ago. Reports insidious onset without acute precipitating event, but noticed it after the birth of her child 7 months ago.  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  Additional history:    Pain is around base of thumb. Feels inflamed around palmar base right thumb.  Pain also diffuse around base of thumb. No pain around level of wrist.  Can try to crack right first CMC joint, sometimes relieves some pressure. Otherwise, no clear joint noise, no clear triggering noted.  Also if trying to fully extend right thumb then gets some pain more diffusely in thumb;  generally holds thumb IP joint in mild flexion.    After baby was born, felt like had generally swelling sensation, some soreness. However, this area remains.        REVIEW OF SYSTEMS:  Review of Systems    OBJECTIVE:  BP 98/64   Wt 59 kg (130 lb)   BMI 25.39 kg/m     General: healthy, alert and in no distress  Skin: no suspicious lesions or rash.  CV: distal perfusion intact   Resp: normal respiratory effort without conversational dyspnea   Psych: normal mood and affect  Gait: NORMAL  Neuro: Normal light sensory exam of extremity       Hand/wrist (right):    Inspection:  No deformity noted.  Minimal proximal thumb swelling. No ecchymosis.    Motion:  Digit motion full flexion at thumb; extension limited to ~10 deg, with stiffness, pain      Sensation:  Grossly intact light touch.    Radial pulses normal, +2/4, capillary refill brisk.    Palpation:  Tender palmar base thumb, with some sensation of nodularity there compared to left    Tinel neg.    Finkelstein neg.      Hand / Upper Extremity Injection/Arthrocentesis: R thumb A1    Date/Time: 5/30/2023 1:15 PM    Performed by: Mik Garcia DO  Authorized by: Mik Garcia DO    Needle Size:  25 G  Guidance: landmark    Approach:  Volar  Condition: trigger finger    Location:  Thumb    Site:  R thumb A1  Medications:  0.5 mL lidocaine 1 %; 20 mg triamcinolone 40 MG/ML  Outcome:  Tolerated well, no immediate complications  Procedure discussed: discussed risks, benefits, and alternatives    Consent Given by:  Patient  Timeout: timeout called immediately prior to procedure    Prep: patient was prepped and draped in usual sterile fashion     Half volume injected          RADIOLOGY:  Final results and radiologist's interpretation, available in the [facility] health record.  Images were reviewed with the patient in the office today.  My personal interpretation of the performed imaging: no acute bony abnormality noted.      Narrative & Impression   EXAM: XR  HAND RIGHT G/E 3 VIEWS  LOCATION: [facility]  DATE/TIME: 3/17/2023 11:48 AM     INDICATION:  Pain of right thumb  COMPARISON: None.                                                                      IMPRESSION: Normal joint spaces and alignment. No fracture.                    Again, thank you for allowing me to participate in the care of your patient.        Sincerely,        Mik Garcia DO

## 2023-05-30 NOTE — Clinical Note
Please put procdoc after imaging. Or, if more consistent with [facility]a or other providers, at the top of the note. Seems misplaced to me to include it in the objective portion of the note. Also, I changed the procdoc from deQuervain to trigger thumb. Please check meds for documentation.

## 2023-05-30 NOTE — PROGRESS NOTES
ASSESSMENT & PLAN    Liam was seen today for pain.    Diagnoses and all orders for this visit:    Trigger thumb, right thumb  -     Hand / Upper Extremity Injection/Arthrocentesis: R thumb A1          See Patient Instructions  Patient Instructions   Overall, condition was consistent with right trigger thumb.  Though there is no active triggering currently, the other types of symptoms and location of symptoms is most consistent with trigger thumb.  For this, we discussed considerations around therapy, splinting/support medications, injections, also potential for referral on to see orthopedic surgery.  Following discussion, right trigger thumb steroid injection done today.  Oval 8 splint provided, may use as desired.  Plan to monitor course with injection at least 2 weeks to start.  Contact clinic if any other questions or concerns.    If you have any further questions for your physician or physician s care team you can contact them thru Run The Campaignhart or by calling  [phone] and use option 3 to leave a voice message.   Messages received during business hours will be returned same day.            Injection Discharge Instructions      You may shower, however avoid swimming, tub baths or hot tubs for 24 hours following your procedure    You may have a mild to moderate increase in pain for several days following the injection.    It may take up to 14 days for the steroid medication to start working although you may feel the effect as early as a few days after the procedure.    You may use ice packs for 10-15 minutes, 3 to 4 times a day at the injection site for comfort    You may use anti-inflammatory medications (such as Ibuprofen or Aleve or Advil) if you are able to take safely, or acetaminophen (Tylenol) for pain control if necessary    If you were fasting, you may resume your normal diet and medications after the procedure    If you have diabetes, check your blood sugar more frequently than usual as your blood sugar  may be higher than normal for 10-14 days following a steroid injection. Contact your doctor who manages your diabetes if your blood sugar is higher than usual      If you experience any of the following, call the clinic @ [phone]  - Fever over 100 degree F  - Swelling, bleeding, redness, drainage, warmth at the injection site  - New or worsening pain        Mik Garcia DO  [facility]    -----  Chief Complaint   Patient presents with     Right Hand - Pain       SUBJECTIVE  Liam Ellis is a/an 43 year old female who is seen as a self referral for evaluation of right hand pain.     The patient is seen by themselves.  The patient is Right handed    Onset: 7 month(s) ago. Reports insidious onset without acute precipitating event, but noticed it after the birth of her child 7 months ago.  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  Additional history:    Pain is around base of thumb. Feels inflamed around palmar base right thumb.  Pain also diffuse around base of thumb. No pain around level of wrist.  Can try to crack right first CMC joint, sometimes relieves some pressure. Otherwise, no clear joint noise, no clear triggering noted.  Also if trying to fully extend right thumb then gets some pain more diffusely in thumb; generally holds thumb IP joint in mild flexion.    After baby was born, felt like had generally swelling sensation, some soreness. However, this area remains.        REVIEW OF SYSTEMS:  Review of Systems    OBJECTIVE:  BP 98/64   Wt 59 kg (130 lb)   BMI 25.39  kg/m     General: healthy, alert and in no distress  Skin: no suspicious lesions or rash.  CV: distal perfusion intact   Resp: normal respiratory effort without conversational dyspnea   Psych: normal mood and affect  Gait: NORMAL  Neuro: Normal light sensory exam of extremity       Hand/wrist (right):    Inspection:  No deformity noted.  Minimal proximal thumb swelling. No ecchymosis.    Motion:  Digit motion full flexion at thumb; extension limited to ~10 deg, with stiffness, pain      Sensation:  Grossly intact light touch.    Radial pulses normal, +2/4, capillary refill brisk.    Palpation:  Tender palmar base thumb, with some sensation of nodularity there compared to left    Tinel neg.    Finkelstein neg.      Hand / Upper Extremity Injection/Arthrocentesis: R thumb A1    Date/Time: 5/30/2023 1:15 PM    Performed by: Mik Garcia DO  Authorized by: Mik Garcia DO    Needle Size:  25 G  Guidance: landmark    Approach:  Volar  Condition: trigger finger    Location:  Thumb    Site:  R thumb A1  Medications:  0.5 mL lidocaine 1 %; 20 mg triamcinolone 40 MG/ML  Outcome:  Tolerated well, no immediate complications  Procedure discussed: discussed risks, benefits, and alternatives    Consent Given by:  Patient  Timeout: timeout called immediately prior to procedure    Prep: patient was prepped and draped in usual sterile fashion     Half volume injected          RADIOLOGY:  Final results and radiologist's interpretation, available in the [facility] health record.  Images were reviewed with the patient in the office today.  My personal interpretation of the performed imaging: no acute bony abnormality noted.      Narrative & Impression   EXAM: XR HAND RIGHT G/E 3 VIEWS  LOCATION: [facility]  DATE/TIME: 3/17/2023 11:48 AM     INDICATION:  Pain of right thumb  COMPARISON: None.                                                                      IMPRESSION: Normal joint spaces and  alignment. No fracture.

## 2023-05-31 RX ORDER — TRIAMCINOLONE ACETONIDE 40 MG/ML
20 INJECTION, SUSPENSION INTRA-ARTICULAR; INTRAMUSCULAR
Status: SHIPPED | OUTPATIENT
Start: 2023-05-30

## 2023-05-31 RX ORDER — LIDOCAINE HYDROCHLORIDE 10 MG/ML
0.5 INJECTION, SOLUTION INFILTRATION; PERINEURAL
Status: SHIPPED | OUTPATIENT
Start: 2023-05-30

## 2023-07-10 ENCOUNTER — APPOINTMENT (OUTPATIENT)
Dept: OPTOMETRY | Facility: CLINIC | Age: 43
End: 2023-07-10
Payer: COMMERCIAL

## 2023-07-10 PROCEDURE — 92340 FIT SPECTACLES MONOFOCAL: CPT | Performed by: OPTOMETRIST

## 2023-11-15 ENCOUNTER — ALLIED HEALTH/NURSE VISIT (OUTPATIENT)
Dept: FAMILY MEDICINE | Facility: CLINIC | Age: 43
End: 2023-11-15
Payer: COMMERCIAL

## 2023-11-15 DIAGNOSIS — Z23 HIGH PRIORITY FOR 2019-NCOV VACCINE: ICD-10-CM

## 2023-11-15 DIAGNOSIS — Z23 NEED FOR VACCINATION: Primary | ICD-10-CM

## 2023-11-15 DIAGNOSIS — Z23 NEED FOR PROPHYLACTIC VACCINATION AND INOCULATION AGAINST INFLUENZA: ICD-10-CM

## 2023-11-15 PROCEDURE — 90686 IIV4 VACC NO PRSV 0.5 ML IM: CPT

## 2023-11-15 PROCEDURE — 91320 SARSCV2 VAC 30MCG TRS-SUC IM: CPT

## 2023-11-15 PROCEDURE — 90746 HEPB VACCINE 3 DOSE ADULT IM: CPT

## 2023-11-15 PROCEDURE — 90472 IMMUNIZATION ADMIN EACH ADD: CPT

## 2023-11-15 PROCEDURE — 90471 IMMUNIZATION ADMIN: CPT

## 2023-11-15 PROCEDURE — 90480 ADMN SARSCOV2 VAC 1/ONLY CMP: CPT

## 2023-11-15 PROCEDURE — 99207 PR NO CHARGE NURSE ONLY: CPT

## 2024-01-18 NOTE — PROGRESS NOTES
Assessment & Plan     Alteration in speech  Intermittent   - MRA Brain ([place]) wo Contrast; Future  - MR Brain w/o & w Contrast; Future  Pt describes slurred words and difficulty with getting words out at times and the sound is not correct when she does. Does not occur in native language, I think likely not neurological condition although with some visual concerns will get imaging for reassurance.  Likely fatigue due to loss of sleep, possible anxiety/stress.  Pt without drooping face, numbness/tingling, weakness in extremities, total loss of speech or vision, headaches.    Vision changes  With some blurriness   - MRA Brain ([place]) wo Contrast; Future  - MR Brain w/o & w Contrast; Future  Blurriness occurs  beyond simple up close reading. EOM with overcorrective nystagmus on flat plane both directions without full correction. Possible relation to visual changes, could be incidental finding as symptoms not present on exam.  Could relate to brain fatigue and alteration in speech       Will wait on imaging with strong consideration sending to opthalmology vs neuro op vs neurology     Follow up dependent on imaging otherwise if symptoms worsen     Subjective   Liam is a 43 year old, presenting for the following health issues:  Speach concern    Less clear words for a year off/on. Before noticed it now others are noticing it.           1/25/2024    10:12 AM   Additional Questions   Roomed by Stone Ritter MA   Accompanied by Self     History of Present Illness       Reason for visit:  Having slurred speech,words cannot say clearly  Symptom onset:  More than a month  Symptom intensity:  Mild  Symptom progression:  Worsening  Had these symptoms before:  Yes  Has tried/received treatment for these symptoms:  No  What makes it worse:  Speech  What makes it better:  May need further check upssnd treatment if needed    She eats 2-3 servings of fruits and vegetables daily.She consumes 0 sweetened  beverage(s) daily.She exercises with enough effort to increase her heart rate 9 or less minutes per day.  She exercises with enough effort to increase her heart rate 3 or less days per week.   She is taking medications regularly.                 Objective    BP 94/65   Pulse 71   Temp 97.2  F (36.2  C) (Tympanic)   Resp 14   Ht 1.524 m (5')   Wt 53.1 kg (117 lb)   SpO2 99%   Breastfeeding No   BMI 22.85 kg/m    Body mass index is 22.85 kg/m .    Review of Systems  Constitutional, neuro, ENT, endocrine, pulmonary, cardiac, gastrointestinal, genitourinary, musculoskeletal, integument and psychiatric systems are negative, except as otherwise noted.    Physical Exam   GENERAL: alert and no distress  EYES: Nystagmus on flat plane both directions eyes with equal movement with overcorrection without return of fixation back to finger when tracking. Eyes dropped out to maximal width each time tested. No other abnormalities noted   NECK: no adenopathy, no asymmetry, masses, or scars  RESP: lungs clear to auscultation - no rales, rhonchi or wheezes  CV: regular rate and rhythm, normal S1 S2, no S3 or S4, no murmur, click or rub, no peripheral edema  ABDOMEN: soft, nontender, no hepatosplenomegaly, no masses and bowel sounds normal  MS: no gross musculoskeletal defects noted, no edema  SKIN: no suspicious lesions or rashes  NEURO: Normal strength and tone, mentation intact and speech normal            Signed Electronically by: Maldonado Talley PA-C

## 2024-01-25 ENCOUNTER — OFFICE VISIT (OUTPATIENT)
Dept: FAMILY MEDICINE | Facility: CLINIC | Age: 44
End: 2024-01-25
Payer: COMMERCIAL

## 2024-01-25 VITALS
SYSTOLIC BLOOD PRESSURE: 94 MMHG | DIASTOLIC BLOOD PRESSURE: 65 MMHG | WEIGHT: 117 LBS | RESPIRATION RATE: 14 BRPM | HEIGHT: 60 IN | OXYGEN SATURATION: 99 % | BODY MASS INDEX: 22.97 KG/M2 | TEMPERATURE: 97.2 F | HEART RATE: 71 BPM

## 2024-01-25 DIAGNOSIS — H53.9 VISION CHANGES: ICD-10-CM

## 2024-01-25 DIAGNOSIS — R47.89 ALTERATION IN SPEECH: Primary | ICD-10-CM

## 2024-01-25 PROCEDURE — 99213 OFFICE O/P EST LOW 20 MIN: CPT | Performed by: PHYSICIAN ASSISTANT

## 2024-01-25 ASSESSMENT — PAIN SCALES - GENERAL: PAINLEVEL: NO PAIN (0)

## 2024-02-07 ENCOUNTER — TELEPHONE (OUTPATIENT)
Dept: OPTOMETRY | Facility: CLINIC | Age: 44
End: 2024-02-07
Payer: COMMERCIAL

## 2024-02-07 NOTE — TELEPHONE ENCOUNTER
Called mobile number earlier today and not able to reach    [phone]     Spoke to pt at 1718    New floater in each eye-- 2 smaller floaters.    Pt has MRI scheduled this Friday for slurred speech/seen by provider 1-25-23    No acute vision changes    Vision stable-- able to read with reading glasses.    No flashing    Scheduled appt on Friday with Dr. Bay    Pt aware of date/time/location/duration/hospital based clinic/non-[employer] insurance.    Jeffery Beatty RN 5:24 PM 02/07/24

## 2024-02-07 NOTE — TELEPHONE ENCOUNTER
Health Call Center    Phone Message    May a detailed message be left on voicemail: yes     Reason for Call: Symptoms or Concerns     If patient has red-flag symptoms, warm transfer to triage line    Current symptom or concern: Floaters both eyes    Symptoms have been present for:  1 day(s)    Has patient previously been seen for this? No    By : Dr Knutson    Date: 04/17/2023    Are there any new or worsening symptoms? Yes: Patient experiencing floaters in both eyes since yesterday. Per protocol to send urgent TE.    Please call patient back at [phone]. Thank you.    Action Taken: Message routed to:  [facility]    Travel Screening: Not Applicable

## 2024-02-09 ENCOUNTER — OFFICE VISIT (OUTPATIENT)
Dept: OPHTHALMOLOGY | Facility: CLINIC | Age: 44
End: 2024-02-09
Attending: OPTOMETRIST
Payer: COMMERCIAL

## 2024-02-09 ENCOUNTER — HOSPITAL ENCOUNTER (OUTPATIENT)
Dept: MRI IMAGING | Facility: HOSPITAL | Age: 44
Discharge: HOME | End: 2024-02-09
Attending: PHYSICIAN ASSISTANT | Admitting: PHYSICIAN ASSISTANT
Payer: COMMERCIAL

## 2024-02-09 DIAGNOSIS — R47.89 ALTERATION IN SPEECH: ICD-10-CM

## 2024-02-09 DIAGNOSIS — H53.9 VISION CHANGES: ICD-10-CM

## 2024-02-09 DIAGNOSIS — H43.393 FLOATERS IN VISUAL FIELD, BILATERAL: Primary | ICD-10-CM

## 2024-02-09 DIAGNOSIS — H52.4 PRESBYOPIA: ICD-10-CM

## 2024-02-09 PROCEDURE — 92015 DETERMINE REFRACTIVE STATE: CPT | Performed by: OPTOMETRIST

## 2024-02-09 PROCEDURE — 70544 MR ANGIOGRAPHY HEAD W/O DYE: CPT

## 2024-02-09 PROCEDURE — 99213 OFFICE O/P EST LOW 20 MIN: CPT | Performed by: OPTOMETRIST

## 2024-02-09 PROCEDURE — 255N000002 HC RX 255 OP 636: Performed by: PHYSICIAN ASSISTANT

## 2024-02-09 PROCEDURE — 70553 MRI BRAIN STEM W/O & W/DYE: CPT

## 2024-02-09 PROCEDURE — 92014 COMPRE OPH EXAM EST PT 1/>: CPT | Performed by: OPTOMETRIST

## 2024-02-09 PROCEDURE — A9585 GADOBUTROL INJECTION: HCPCS | Performed by: PHYSICIAN ASSISTANT

## 2024-02-09 RX ORDER — GADOBUTROL 604.72 MG/ML
0.1 INJECTION INTRAVENOUS ONCE
Status: COMPLETED | OUTPATIENT
Start: 2024-02-09 | End: 2024-02-09

## 2024-02-09 RX ADMIN — GADOBUTROL 5 ML: 604.72 INJECTION INTRAVENOUS at 08:53

## 2024-02-09 ASSESSMENT — VISUAL ACUITY
METHOD: SNELLEN - LINEAR
OS_SC: J5
OD_SC: J5
OS_SC+: -2
OS_SC: 20/20
OD_SC: 20/20

## 2024-02-09 ASSESSMENT — EXTERNAL EXAM - LEFT EYE: OS_EXAM: NORMAL

## 2024-02-09 ASSESSMENT — CONF VISUAL FIELD
OD_SUPERIOR_TEMPORAL_RESTRICTION: 0
OD_SUPERIOR_NASAL_RESTRICTION: 0
METHOD: COUNTING FINGERS
OS_SUPERIOR_TEMPORAL_RESTRICTION: 0
OD_INFERIOR_NASAL_RESTRICTION: 0
OS_NORMAL: 1
OD_INFERIOR_TEMPORAL_RESTRICTION: 0
OS_INFERIOR_TEMPORAL_RESTRICTION: 0
OS_SUPERIOR_NASAL_RESTRICTION: 0
OD_NORMAL: 1
OS_INFERIOR_NASAL_RESTRICTION: 0

## 2024-02-09 ASSESSMENT — SLIT LAMP EXAM - LIDS
COMMENTS: NORMAL
COMMENTS: NORMAL

## 2024-02-09 ASSESSMENT — REFRACTION_MANIFEST
OD_AXIS: 105
OD_ADD: +2.00
OD_SPHERE: -0.25
OS_ADD: +2.00
OD_CYLINDER: +0.25
OS_SPHERE: -0.25
OS_CYLINDER: SPHERE

## 2024-02-09 ASSESSMENT — CUP TO DISC RATIO
OD_RATIO: 0.1
OS_RATIO: 0.05

## 2024-02-09 ASSESSMENT — TONOMETRY
IOP_METHOD: TONOPEN
OS_IOP_MMHG: 10
OD_IOP_MMHG: 11

## 2024-02-09 ASSESSMENT — EXTERNAL EXAM - RIGHT EYE: OD_EXAM: NORMAL

## 2024-02-09 NOTE — PATIENT INSTRUCTIONS
Discussed vitreoul floaters and PVD   Educated patient on signs and symptoms of retinal detachment including increase in flashes, floaters, or a change in vision. If symptoms present, return to clinic immediately.;  New prescription for glasses   readers +1.75  Yearly exam

## 2024-02-09 NOTE — PROGRESS NOTES
Assessment & Plan       Liam Ellis is a 43 year old female with the following diagnoses:   1. Floaters in visual field, bilateral - Both Eyes    2. Presbyopia - Both Eyes        Discussed vitreoul floaters and PVD   Educated patient on signs and symptoms of retinal detachment including increase in flashes, floaters, or a change in vision. If symptoms present, return to clinic immediately.;  New prescription for glasses   readers +1.75  Yearly exam     Patient disposition:   Return in about 1 year (around 2/9/2025).          Complete documentation of historical and exam elements from today's encounter can be found in the full encounter summary report (not reduplicated in this progress note). I personally obtained the chief complaint(s) and history of present illness.  I confirmed and edited as necessary the review of systems, past medical/surgical history, family history, social history, and examination findings as documented by others; and I examined the patient myself. I personally reviewed the relevant tests, images, and reports as documented above. I formulated and edited as necessary the assessment and plan and discussed the findings and management plan with the patient and family.  Dr. Arun Bay

## 2024-02-09 NOTE — NURSING NOTE
Chief Complaints and History of Present Illnesses   Patient presents with    Floaters Both Eyes     Chief Complaint(s) and History of Present Illness(es)       Floaters Both Eyes              Laterality: both eyes              Comments    Pt returns for evaluation of new floaters in both eyes. She describes these as looking like small black dots. She has one in each eye. Onset 3 days ago. Denies flashes. No acute vision loss, but does have mild blurred vision. She wonders if prescription changes after pregnancy. She presently wears OTC readers for her near needs. She does use Refresh gtts PRN each eye. Eyes remain comfortable.    She has increased difficulty with slowed or delayed speech. She had a neuro check as recommended by her PA, as her visual tracking seemed slow. She completed an MRI just this morning and results are pending.

## 2024-02-11 ENCOUNTER — HEALTH MAINTENANCE LETTER (OUTPATIENT)
Age: 44
End: 2024-02-11

## 2024-04-21 ENCOUNTER — HEALTH MAINTENANCE LETTER (OUTPATIENT)
Age: 44
End: 2024-04-21

## 2024-04-24 NOTE — PROGRESS NOTES
ASSESSMENT & PLAN    Liam was seen today for follow up.    Diagnoses and all orders for this visit:    Trigger thumb, right thumb  -     Hand / Upper Extremity Injection/Arthrocentesis: R thumb A1    Other orders  -     Cancel: Small Joint Injection/Arthrocentesis      Recurrence of symptoms after previous successful trigger thumb injection. Reviewed splinting, injection, referral options. Additional consideration could be hand therapy as well.  Repeat injection today.  See below.  Questions answered. Discussed signs and symptoms that may indicate more serious issues; the patient was instructed to seek appropriate care if noted. Liam indicates understanding of these issues and agrees with the plan.    See Patient Instructions  Patient Instructions   Repeat right trigger thumb steroid injection done today, with good results from previous injection. Should take effect in the next few days, can be up to 1-2 weeks for maximal benefit.  Reviewed potential use of splint for the thumb, may use with sleep routinely to start, and at other times as desired. New oval 8 splint provided today.  If this injection is not beneficial, or if symptoms return after this second injection, would likely refer on to discuss further with orthopedic surgery. If interested in referral, contact clinic ([facility] or phone call is fine). Otherwise, will leave follow-up here open ended.    If you have any further questions for your physician or physician s care team you can contact them thru [facility] or by calling [phone].        Injection Discharge Instructions    You may shower, however avoid swimming, tub baths or hot tubs for 24 hours following your procedure  You may have a mild to moderate increase in pain for several days following the injection.  It may take up to 14 days for the steroid medication to start working although you may feel the effect as early as a few days after the procedure.  You may use ice packs for 10-15 minutes,  3 to 4 times a day at the injection site for comfort  You may use anti-inflammatory medications (such as Ibuprofen or Aleve or Advil) if you are able to take safely, or acetaminophen (Tylenol) for pain control if necessary  If you were fasting, you may resume your normal diet and medications after the procedure  If you have diabetes, check your blood sugar more frequently than usual as your blood sugar may be higher than normal for 10-14 days following a steroid injection. Contact your doctor who manages your diabetes if your blood sugar is higher than usual    If you experience any of the following, call the clinic @ [phone]  - Fever over 100 degree F  - Swelling, bleeding, redness, drainage, warmth at the injection site  - New or worsening pain      Mik Garcia DO  [facility]    SUBJECTIVE- Interim History April 24, 2024    No chief complaint on file.      Liam Ellis is a 44 year old female who is seen in f/u up for Data Unavailable. Since last visit on 5/30/23 patient has had an increase in pain over the last month or so. No interim injury.   Right thumb steroid injection done on 5/30/23 provided relief for 9 months.  Would like consultation on possible injection today.    The patient is seen by themselves.    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 See previous notes.        Hand / Upper Extremity Injection/Arthrocentesis: R thumb A1    Date/Time: 4/29/2024 8:09 AM    Performed by: Mik Garcia DO  Authorized by: Mik Garcia DO    Indications:  Pain  Needle Size:  25 G  Guidance: landmark     Approach:  Volar  Condition: trigger finger    Location:  Thumb    Site:  R thumb A1  Medications:  10 mg triamcinolone 40 MG/ML; 0.25 mL lidocaine 1 %  Outcome:  Tolerated well, no immediate complications  Procedure discussed: discussed risks, benefits, and alternatives    Consent Given by:  Patient  Timeout: timeout called immediately prior to procedure    Prep: patient was prepped and draped in usual sterile fashion

## 2024-04-29 ENCOUNTER — OFFICE VISIT (OUTPATIENT)
Dept: ORTHOPEDICS | Facility: CLINIC | Age: 44
End: 2024-04-29
Payer: COMMERCIAL

## 2024-04-29 VITALS — WEIGHT: 117 LBS | HEIGHT: 60 IN | BODY MASS INDEX: 22.97 KG/M2

## 2024-04-29 DIAGNOSIS — M65.311 TRIGGER THUMB, RIGHT THUMB: Primary | ICD-10-CM

## 2024-04-29 PROCEDURE — 20550 NJX 1 TENDON SHEATH/LIGAMENT: CPT | Mod: F5 | Performed by: PEDIATRICS

## 2024-04-29 RX ORDER — TRIAMCINOLONE ACETONIDE 40 MG/ML
10 INJECTION, SUSPENSION INTRA-ARTICULAR; INTRAMUSCULAR
Status: SHIPPED | OUTPATIENT
Start: 2024-04-29

## 2024-04-29 RX ORDER — LIDOCAINE HYDROCHLORIDE 10 MG/ML
0.25 INJECTION, SOLUTION INFILTRATION; PERINEURAL
Status: SHIPPED | OUTPATIENT
Start: 2024-04-29

## 2024-04-29 RX ADMIN — TRIAMCINOLONE ACETONIDE 10 MG: 40 INJECTION, SUSPENSION INTRA-ARTICULAR; INTRAMUSCULAR at 08:09

## 2024-04-29 RX ADMIN — LIDOCAINE HYDROCHLORIDE 0.25 ML: 10 INJECTION, SOLUTION INFILTRATION; PERINEURAL at 08:09

## 2024-04-29 NOTE — LETTER
4/29/2024         RE: Liam Ellis  [address] [facility] [address]        Dear Colleague,    Thank you for referring your patient, Liam Ellis, to the [facility]. Please see a copy of my visit note below.     ASSESSMENT & PLAN    Liam was seen today for follow up.    Diagnoses and all orders for this visit:    Trigger thumb, right thumb  -     Hand / Upper Extremity Injection/Arthrocentesis: R thumb A1    Other orders  -     Cancel: Small Joint Injection/Arthrocentesis      Recurrence of symptoms after previous successful trigger thumb injection. Reviewed splinting, injection, referral options. Additional consideration could be hand therapy as well.  Repeat injection today.  See below.  Questions answered. Discussed signs and symptoms that may indicate more serious issues; the patient was instructed to seek appropriate care if noted. Liam indicates understanding of these issues and agrees with the plan.    See Patient Instructions  Patient Instructions   Repeat right trigger thumb steroid injection done today, with good results from previous injection. Should take effect in the next few days, can be up to 1-2 weeks for maximal benefit.  Reviewed potential use of splint for the thumb, may use with sleep routinely to start, and at other times as desired. New oval 8 splint provided today.  If this injection is not beneficial, or if symptoms return after this second injection, would likely refer on to discuss further with orthopedic surgery. If interested in referral, contact clinic ([facility] or phone call is fine). Otherwise, will leave follow-up here open ended.    If you have any further questions for your physician or physician s care team you can contact them thru [facility] or by calling [phone].        Injection Discharge Instructions    You may shower, however avoid swimming, tub baths or hot tubs for 24 hours following your procedure  You may have a mild  to moderate increase in pain for several days following the injection.  It may take up to 14 days for the steroid medication to start working although you may feel the effect as early as a few days after the procedure.  You may use ice packs for 10-15 minutes, 3 to 4 times a day at the injection site for comfort  You may use anti-inflammatory medications (such as Ibuprofen or Aleve or Advil) if you are able to take safely, or acetaminophen (Tylenol) for pain control if necessary  If you were fasting, you may resume your normal diet and medications after the procedure  If you have diabetes, check your blood sugar more frequently than usual as your blood sugar may be higher than normal for 10-14 days following a steroid injection. Contact your doctor who manages your diabetes if your blood sugar is higher than usual    If you experience any of the following, call the clinic @ [phone]  - Fever over 100 degree F  - Swelling, bleeding, redness, drainage, warmth at the injection site  - New or worsening pain      [facility]    SUBJECTIVE- Interim History April 24, 2024    No chief complaint on file.      Liam Ellis is a 44 year old female who is seen in f/u up for Data Unavailable. Since last visit on 5/30/23 patient has had an increase in pain over the last month or so. No interim injury.   Right thumb steroid injection done on 5/30/23 provided relief for 9 months.  Would like consultation on possible injection today.    The patient is seen by themselves.    Orthopedic/Surgical history: NO  Social History/Occupation: Nurse in Oncology at [employer]      REVIEW OF SYSTEMS:  Review of Systems    OBJECTIVE:  Ht 1.524 m (5')   Wt 53.1 kg (117 lb)   BMI 22.85 kg/m         Right thumb:  No swelling or ecchymosis  Some discomfort with active motion  Slight triggering intermittently today  Tender over level A1 pulley      RADIOLOGY:  None new. See previous  notes.        Hand / Upper Extremity Injection/Arthrocentesis: R thumb A1    Date/Time: 4/29/2024 8:09 AM    Performed by: Mik Garcia DO  Authorized by: Mik Garcia DO    Indications:  Pain  Needle Size:  25 G  Guidance: landmark    Approach:  Volar  Condition: trigger finger    Location:  Thumb    Site:  R thumb A1  Medications:  10 mg triamcinolone 40 MG/ML; 0.25 mL lidocaine 1 %  Outcome:  Tolerated well, no immediate complications  Procedure discussed: discussed risks, benefits, and alternatives    Consent Given by:  Patient  Timeout: timeout called immediately prior to procedure    Prep: patient was prepped and draped in usual sterile fashion              Again, thank you for allowing me to participate in the care of your patient.        Sincerely,        Mik Garcia DO

## 2024-04-29 NOTE — PATIENT INSTRUCTIONS
Repeat right trigger thumb steroid injection done today, with good results from previous injection. Should take effect in the next few days, can be up to 1-2 weeks for maximal benefit.  Reviewed potential use of splint for the thumb, may use with sleep routinely to start, and at other times as desired. New oval 8 splint provided today.  If this injection is not beneficial, or if symptoms return after this second injection, would likely refer on to discuss further with orthopedic surgery. If interested in referral, [facility] phone [facility]). Otherwise, will leave follow-up here open ended.    If you have any further questions for your physician or physician s care team you can contact them thru [facility] or by calling [phone].        Injection Discharge Instructions    You may shower, however avoid swimming, tub baths or hot tubs for 24 hours following your procedure  You may have a mild to moderate increase in pain for several days following the injection.  It may take up to 14 days for the steroid medication to start working although you may feel the effect as early as a few days after the procedure.  You may use ice packs for 10-15 minutes, 3 to 4 times a day at the injection site for comfort  You may use anti-inflammatory medications (such as Ibuprofen or Aleve or Advil) if you are able to take safely, or acetaminophen (Tylenol) for pain control if necessary  If you were fasting, you may resume your normal diet and medications after the procedure  If you have diabetes, check your blood sugar more frequently than usual as your blood sugar may be higher than normal for 10-14 days following a steroid injection. Contact your doctor who manages your diabetes if your blood sugar is higher than usual    If you experience any of the following, call the clinic @ [phone]  - Fever over 100 degree F  - Swelling, bleeding, redness, drainage, warmth at the injection site  - New or worsening  pain

## 2024-09-21 ENCOUNTER — OFFICE VISIT (OUTPATIENT)
Dept: URGENT CARE | Facility: URGENT CARE | Age: 44
End: 2024-09-21
Payer: COMMERCIAL

## 2024-09-21 VITALS
HEART RATE: 66 BPM | RESPIRATION RATE: 16 BRPM | TEMPERATURE: 97.7 F | WEIGHT: 114.2 LBS | SYSTOLIC BLOOD PRESSURE: 102 MMHG | DIASTOLIC BLOOD PRESSURE: 70 MMHG | BODY MASS INDEX: 22.3 KG/M2 | OXYGEN SATURATION: 96 %

## 2024-09-21 DIAGNOSIS — R09.81 CONGESTION OF PARANASAL SINUS: ICD-10-CM

## 2024-09-21 DIAGNOSIS — J02.9 PHARYNGITIS, UNSPECIFIED ETIOLOGY: Primary | ICD-10-CM

## 2024-09-21 LAB
DEPRECATED S PYO AG THROAT QL EIA: NEGATIVE
GROUP A STREP BY PCR: DETECTED

## 2024-09-21 PROCEDURE — 99213 OFFICE O/P EST LOW 20 MIN: CPT | Performed by: FAMILY MEDICINE

## 2024-09-21 PROCEDURE — 87635 SARS-COV-2 COVID-19 AMP PRB: CPT | Performed by: FAMILY MEDICINE

## 2024-09-21 PROCEDURE — 87651 STREP A DNA AMP PROBE: CPT | Performed by: FAMILY MEDICINE

## 2024-09-21 RX ORDER — FLUTICASONE PROPIONATE 50 MCG
1 SPRAY, SUSPENSION (ML) NASAL DAILY
COMMUNITY
Start: 2024-09-21

## 2024-09-21 NOTE — PROGRESS NOTES
Assessment & Plan     Liam was seen today for pharyngitis.    Diagnoses and all orders for this visit:    Pharyngitis, unspecified etiology- most likely viral  -     Streptococcus A Rapid Screen w/Reflex to PCR - Clinic Collect  -     Symptomatic COVID-19 Virus (Coronavirus) by PCR Nose - pending at this time.  -     Group A Streptococcus PCR Throat Swab     Congestion of paranasal sinus  -     fluticasone (FLONASE) 50 MCG/ACT nasal spray; Spray 1 spray into both nostrils daily.      Will notify her with pending results.    Patient education and Handout with home care instructions given. See AVS for details.      Return in about 1 week (around 9/28/2024) for follow up with PCP if symptoms fail to improve.    Subjective   Liam is a 44 year old, presenting for the following health issues:  Pharyngitis (Sore throat for the past 5 days, some dry cough )    HPI       Acute Illness   Acute illness concerns: sore throat, cough  Onset: 5 days  Fever: no   Chills/Sweats: no   Headache (location?): no   Sinus Pressure:YES- chronic, no going  Conjunctivitis:  no  Ear Pain: no  Rhinorrhea: YES  Congestion: YES  Sore Throat: YES- painful to swallow   Cough: YES-non-productive  Wheeze: no   Decreased Appetite: no   Nausea: YES  Vomiting: no   Diarrhea:  YES  Dysuria/Freq.: no   Fatigue/Achiness: YES  Sick/Strep Exposure: YES- son has similar symptoms      Therapies Tried and outcome: OTC Delsum    Otherwise healthy. Works as a Nurse.        Review of Systems  Constitutional, HEENT, cardiovascular, pulmonary, gi and gu systems are negative, except as otherwise noted.      Objective    /70 (BP Location: Left arm, Patient Position: Sitting, Cuff Size: Adult Regular)   Pulse 66   Temp 97.7  F (36.5  C) (Tympanic)   Resp 16   Wt 51.8 kg (114 lb 3.2 oz)   LMP 08/14/2024 (Approximate)   SpO2 96%   BMI 22.30 kg/m    Body mass index is 22.3 kg/m .  Physical Exam   GENERAL: alert and no distress  HENT: ear canals and  TM's normal, bilateral nasal turbinate hypertrophy. Mouth without ulcers or lesions. No tonsillar erythematous enlargement or exudates noted.  NECK: no adenopathy, no asymmetry, masses, or scars  RESP: lungs clear to auscultation - no rales, rhonchi or wheezes  CV: regular rate and rhythm, normal S1 S2, no S3 or S4, no murmur, click or rub, no peripheral edema  MS: no gross musculoskeletal defects noted, no edema  PSYCH: mentation appears normal, affect normal/bright    DATA  Reviewed and discussed with patient prior to discharge.  Results for orders placed or performed in visit on 09/21/24   Streptococcus A Rapid Screen w/Reflex to PCR - Clinic Collect     Status: Normal    Specimen: Throat; Swab   Result Value Ref Range    Group A Strep antigen Negative Negative             Signed Electronically by: Joann Mcfarland MD

## 2024-09-22 DIAGNOSIS — J02.0 STREPTOCOCCAL SORE THROAT: Primary | ICD-10-CM

## 2024-09-22 LAB — SARS-COV-2 RNA RESP QL NAA+PROBE: NEGATIVE

## 2024-09-22 RX ORDER — AMOXICILLIN 500 MG/1
500 CAPSULE ORAL 2 TIMES DAILY
Qty: 20 CAPSULE | Refills: 0 | Status: SHIPPED | OUTPATIENT
Start: 2024-09-22 | End: 2024-10-02

## 2024-09-27 ENCOUNTER — OFFICE VISIT (OUTPATIENT)
Dept: FAMILY MEDICINE | Facility: CLINIC | Age: 44
End: 2024-09-27
Payer: COMMERCIAL

## 2024-09-27 VITALS
HEART RATE: 72 BPM | DIASTOLIC BLOOD PRESSURE: 62 MMHG | HEIGHT: 60 IN | BODY MASS INDEX: 22.42 KG/M2 | SYSTOLIC BLOOD PRESSURE: 93 MMHG | RESPIRATION RATE: 12 BRPM | WEIGHT: 114.2 LBS | OXYGEN SATURATION: 98 % | TEMPERATURE: 97.6 F

## 2024-09-27 DIAGNOSIS — Z23 NEED FOR HEPATITIS A IMMUNIZATION: ICD-10-CM

## 2024-09-27 DIAGNOSIS — Z71.84 ENCOUNTER FOR COUNSELING FOR TRAVEL: Primary | ICD-10-CM

## 2024-09-27 DIAGNOSIS — Z23 NEED FOR IMMUNIZATION AGAINST TYPHOID: ICD-10-CM

## 2024-09-27 PROCEDURE — 90472 IMMUNIZATION ADMIN EACH ADD: CPT | Performed by: PHYSICIAN ASSISTANT

## 2024-09-27 PROCEDURE — 90632 HEPA VACCINE ADULT IM: CPT | Performed by: PHYSICIAN ASSISTANT

## 2024-09-27 PROCEDURE — 90656 IIV3 VACC NO PRSV 0.5 ML IM: CPT | Performed by: PHYSICIAN ASSISTANT

## 2024-09-27 PROCEDURE — 90691 TYPHOID VACCINE IM: CPT | Performed by: PHYSICIAN ASSISTANT

## 2024-09-27 PROCEDURE — 90471 IMMUNIZATION ADMIN: CPT | Performed by: PHYSICIAN ASSISTANT

## 2024-09-27 PROCEDURE — 99402 PREV MED CNSL INDIV APPRX 30: CPT | Mod: 25 | Performed by: PHYSICIAN ASSISTANT

## 2024-09-27 RX ORDER — AZITHROMYCIN 500 MG/1
TABLET, FILM COATED ORAL
Qty: 3 TABLET | Refills: 0 | Status: SHIPPED | OUTPATIENT
Start: 2024-09-27

## 2024-09-27 NOTE — NURSING NOTE
Prior to immunization administration, verified patients identity using patient s name and date of birth. Please see Immunization Activity for additional information.     Screening Questionnaire for Adult Immunization    Are you sick today?   No   Do you have allergies to medications, food, a vaccine component or latex?   No   Have you ever had a serious reaction after receiving a vaccination?   No   Do you have a long-term health problem with heart, lung, kidney, or metabolic disease (e.g., diabetes), asthma, a blood disorder, no spleen, complement component deficiency, a cochlear implant, or a spinal fluid leak?  Are you on long-term aspirin therapy?   No   Do you have cancer, leukemia, HIV/AIDS, or any other immune system problem?   No   Do you have a parent, brother, or sister with an immune system problem?   No   In the past 3 months, have you taken medications that affect  your immune system, such as prednisone, other steroids, or anticancer drugs; drugs for the treatment of rheumatoid arthritis, Crohn s disease, or psoriasis; or have you had radiation treatments?   No   Have you had a seizure, or a brain or other nervous system problem?   No   During the past year, have you received a transfusion of blood or blood    products, or been given immune (gamma) globulin or antiviral drug?   No   For women: Are you pregnant or is there a chance you could become       pregnant during the next month?   No   Have you received any vaccinations in the past 4 weeks?   No     Immunization questionnaire answers were all negative.      Patient instructed to remain in clinic for 15 minutes afterwards, and to report any adverse reactions.     Screening performed by Sridevi Appiah MA on 9/27/2024 at 7:47 AM.

## 2024-09-27 NOTE — PATIENT INSTRUCTIONS
"See travel packet provided  Recommend ultrathon (mosquito repellant), pepto bismol and imodium  The food and drink choices you make while traveling can impact your likelihood of getting sick.   If you aren't sure if a food or drink is safe, the saying \" BOIL IT, COOK IT, PEEL IT, OR FORGET IT\" can help you decide whether it's okay to consume.   Also bring hand  and sun screen with you.  Safe Travels     If you first start to get mild to moderate diarrhea, take imodium.      If diarrhea is severe or you have a fever with the diarrhea, take the antibiotic (azithromycin).      Today September 27, 2024 you received the    Flu Vaccine    Hepatitis A Vaccine - Please return on 3/27/25 or later for your 2nd and final dose.    Typhoid - injectable. This vaccine is valid for two years. .    These appointments can be made as a NURSE ONLY visit.    **It is very important for the vaccinations to be given on the scheduled day(s), this helps ensure you receive the full effectiveness of the vaccine.**    Please call [facility] with any questions [phone]    Thank you for visiting [facility]    "

## 2024-09-27 NOTE — PROGRESS NOTES
SUBJECTIVE: Liam Ellis , a 44 year old  female, presents for counseling and information regarding upcoming travel to [facility]. Special medical concerns include: none. She anticipates the following unusual exposures: none.    Itinerary:  [facility]    Departure Date: 10/1/2024 Return date: 10/20/2024    Reason for travel (i.e. Business, pleasure): pleasure    Visiting an urban or rural area?: both    Accommodations (i.e. hotel, hostel, friends, family, etc): hotel and friends house        IMMUNIZATION HISTORY  Have you received any vaccinations in the past 4 weeks? If so, which? No  Have you ever fainted from having your blood drawn or from an injection?  No  Have you ever had any bad reaction or side effect from any vaccination?  If so, which? No  Do you live (or work closely) with anyone who has AIDS, an AIDS-like condition, any other immune disorder or who is on chemotherapy for cancer?  No  Have you received any injection of immune globulin or any blood products during the past 12 months?  No    GENERAL MEDICAL HISTORY  Do you have a medical condition that requires medicine or doctor follow-up visits?  No  Do you have a medical condition that is stable now, but that may recur while traveling?  No  Has your spleen been removed?  No  Have you had an illness or a fever in the past 48 hours?  No  Are you pregnant, or might you become pregnant on this trip?  Any chance of pregnancy?  No  Are you breastfeeding?  No  Do you have HIV, AIDS, an AIDS-like condition, any other immune disorder, leukemia or cancer?  No  Have you had your thymus gland removed or history of problems with your thymus, such as myasthenia gravis, DiGeorge syndrome, or thymoma?  No  Do you have a severely low platelet count (thrombocytopenia) or a blood clotting disorder?  No  Have you ever had a convulsion, seizure, epilepsy, neurologic condition or brain infection?  No  Do you have any stomach conditions?  No  Do you have severe renal or kidney  impairment?  No  Do you have a history of mental health concerns?  No  Do you get yeast infections often?  No  Do you have psoriasis?  No  Do you get motion sickness?  No  Have you ever had headaches, nausea, vomiting, or breathing problems from altitude exposure?  No    MEDICINES  Are you taking:   Steroids, prednisone, anti-cancer drugs, or medicines that suppress your immune system? No  Antibiotics or sulfonamides? No  Oral contraceptives (birth control pills)? No  Aspirin therapy (children and teens)? No    ALLERGIES  Are you allergic to:  Any medicines? No  Any foods or other? No  Neomycin, formalin, or fish products? No      Past Medical History:   Diagnosis Date    Cervical high risk HPV (human papillomavirus) test positive 2018, 2019    See problem list    Closed displaced fracture of base of metacarpal bone 6/6/2011    TMJ (temporomandibular joint syndrome) 12/10/2013      Immunization History   Administered Date(s) Administered    COVID-19 12+ (Pfizer) 11/15/2023    COVID-19 Bivalent 12+ (Pfizer) 09/19/2022    COVID-19 MONOVALENT 12+ (Pfizer) 12/30/2020, 01/19/2021, 10/07/2021    Hepatitis B, Adult 03/17/2023, 04/28/2023, 11/15/2023    Influenza (H1N1) 12/23/2009    Influenza (IIV3) PF 12/28/2011, 05/29/2013, 11/20/2014, 10/14/2015    Influenza (intradermal) 09/30/2011    Influenza Vaccine >6 months,quad, PF 10/26/2016, 10/10/2017, 10/12/2019, 10/07/2021, 09/19/2022, 11/15/2023    MMR 05/16/2017    TD,PF 7+ (Tenivac) 04/01/2008    TDAP (Adacel,Boostrix) 08/08/2022    TDAP Vaccine (Adacel) 02/24/2017       Current Outpatient Medications   Medication Sig Dispense Refill    amoxicillin (AMOXIL) 500 MG capsule Take 1 capsule (500 mg) by mouth 2 times daily for 10 days. 20 capsule 0    fluticasone (FLONASE) 50 MCG/ACT nasal spray Spray 1 spray into both nostrils daily.       No Known Allergies     EXAM: deferred    Immunizations discussed include: Hepatitis A and Typhoid  Malaria prophylaxis recommended: not  needed  Symptomatic treatment for traveler's diarrhea: bismuth subsalicylate, loperamide/diphenoxylate, and azithromycin    ASSESSMENT/PLAN:    (Z71.84) Encounter for counseling for travel  (primary encounter diagnosis)    Comment: Hepatitis A and typhoid vaccines today. Patient will return or follow-up with PCP as needed. Prophylaxis given for Traveler's diarrhea and is not needed for Malaria. All questions were answered.     Plan: azithromycin (ZITHROMAX) 500 MG tablet            (Z23) Need for hepatitis A immunization  Comment:   Plan: HEPATITIS A 19+ (HAVRIX/VAQTA)            (Z23) Need for immunization against typhoid  Comment:   Plan: TYPHOID VACCINE, IM              I have reviewed general recommendations for safe travel   including: food/water precautions, insect avoidance, safe sex   practices given high prevalence of HIV and other STDs,   roadway safety. Educational materials and links to the CDC   Traveler's health website have been provided.    Total time 24 minutes, greater than 50 percent in counseling   and coordination of care.

## 2025-01-04 ENCOUNTER — OFFICE VISIT (OUTPATIENT)
Dept: URGENT CARE | Facility: URGENT CARE | Age: 45
End: 2025-01-04
Payer: COMMERCIAL

## 2025-01-04 VITALS
BODY MASS INDEX: 22.16 KG/M2 | TEMPERATURE: 98.3 F | OXYGEN SATURATION: 97 % | DIASTOLIC BLOOD PRESSURE: 69 MMHG | RESPIRATION RATE: 16 BRPM | SYSTOLIC BLOOD PRESSURE: 94 MMHG | WEIGHT: 114.4 LBS | HEART RATE: 97 BPM

## 2025-01-04 DIAGNOSIS — R68.89 FLU-LIKE SYMPTOMS: ICD-10-CM

## 2025-01-04 DIAGNOSIS — A08.4 VIRAL GASTROENTERITIS: Primary | ICD-10-CM

## 2025-01-04 LAB
DEPRECATED S PYO AG THROAT QL EIA: NEGATIVE
FLUAV AG SPEC QL IA: NEGATIVE
FLUBV AG SPEC QL IA: NEGATIVE
S PYO DNA THROAT QL NAA+PROBE: NOT DETECTED

## 2025-01-04 PROCEDURE — 87651 STREP A DNA AMP PROBE: CPT | Performed by: STUDENT IN AN ORGANIZED HEALTH CARE EDUCATION/TRAINING PROGRAM

## 2025-01-04 PROCEDURE — 87804 INFLUENZA ASSAY W/OPTIC: CPT | Performed by: STUDENT IN AN ORGANIZED HEALTH CARE EDUCATION/TRAINING PROGRAM

## 2025-01-04 RX ORDER — ONDANSETRON 4 MG/1
4 TABLET, ORALLY DISINTEGRATING ORAL EVERY 8 HOURS PRN
Qty: 15 TABLET | Refills: 0 | Status: SHIPPED | OUTPATIENT
Start: 2025-01-04 | End: 2025-01-11

## 2025-01-04 NOTE — PATIENT INSTRUCTIONS
Great to meet you in clinic today      Vomiting and diarrhea:  Viral gastroenteritis:  -Influenza and strep tests negative  -Most likely a viral stomach bug, with norovirus going around your work this is most likely  - This takes time to resolve  - We can treat the nausea with zofran  -This typically lasts about 3 days  -You can return to work after symptom-free for 48 hours  -Reasons to return to be seen again: Unable to stay hydrated, blood in vomit or diarrhea, no improvement after 5 days

## 2025-01-04 NOTE — LETTER
2025    Liam Ellis   1980        To Whom it May Concern;    Please excuse Liam Ellis from work. She had a healthcare visit on 2025. She has viral gastroenteritis which could be norovirus. She should not work until the symptoms have been gone for 48 hours to avoid infecting anyone else.     Sincerely,        Jc Dominguez MD

## 2025-01-04 NOTE — PROGRESS NOTES
Assessment & Plan     Flu-like symptoms  Viral gastroenteritis  Had what sounds like viral URI for about 10 days which has been resolving.  Now has had 2 days of vomiting and diarrhea.  Kids have flu B, however her rapid flu is negative.  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  Symptoms consistent with viral gastroenteritis.  - Streptococcus A Rapid Screen w/Reflex to PCR - Clinic Collect  - Prescribed Zofran  - Work note provided  - Return precautions discussed              No follow-ups on file.    Jc Dominguez MD  [facility] [place]    Evie Wheeler is a 44 year old female who presents to clinic today for the following health issues:  Chief Complaint   Patient presents with    Pharyngitis     Sore throat, cough, headache for the last 10 days. Has been improving    Vomiting     Vomiting, diarrhea, abdominal pain for the last 2 days     First had sore throat, cough, headache for 10 days. This had been getting better.    Now for two days vomiting (x8) and diarrhea (x4) and abdominal pain. Abdominal pain less today.    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     Childrens with diagnosed with Flu B and on Tamiflu.              Review of Systems        Objective    BP 94/69   Pulse 97   Temp 98.3  F (36.8  C) (Tympanic)   Resp 16   Wt 51.9 kg (114 lb 6.4 oz)   LMP 09/01/2024 (Approximate)   SpO2 97%   BMI 22.16 kg/m    Physical Exam   GENERAL: alert and no distress  EYES: Eyes grossly normal to inspection, PERRL and conjunctivae and sclerae normal  HENT: nose and mouth without ulcers or lesions  NECK: no adenopathy, no asymmetry, masses, or scars  RESP: lungs clear to auscultation - no rales, rhonchi or wheezes  CV: regular rate and rhythm, normal S1 S2, no S3 or S4, no murmur, click or rub, no peripheral edema  ABDOMEN: soft, mild epigastric tenderness with palpation, no RUQ or RLQ pain, no hepatosplenomegaly,  MS: no gross musculoskeletal  defects noted, no edema  SKIN: no suspicious lesions or rashes  NEURO: mentation intact and speech normal  PSYCH: mentation appears normal, affect normal/bright

## 2025-02-18 ENCOUNTER — ANCILLARY PROCEDURE (OUTPATIENT)
Dept: MAMMOGRAPHY | Facility: CLINIC | Age: 45
End: 2025-02-18
Payer: COMMERCIAL

## 2025-02-18 DIAGNOSIS — Z12.31 VISIT FOR SCREENING MAMMOGRAM: ICD-10-CM

## 2025-02-18 PROCEDURE — 77067 SCR MAMMO BI INCL CAD: CPT

## 2025-03-27 ENCOUNTER — ALLIED HEALTH/NURSE VISIT (OUTPATIENT)
Dept: FAMILY MEDICINE | Facility: CLINIC | Age: 45
End: 2025-03-27
Payer: COMMERCIAL

## 2025-03-27 DIAGNOSIS — Z23 NEED FOR HEPATITIS A IMMUNIZATION: Primary | ICD-10-CM

## 2025-03-27 DIAGNOSIS — Z23 NEED FOR COVID-19 VACCINE: ICD-10-CM

## 2025-03-27 NOTE — PROGRESS NOTES
Prior to immunization administration, verified patients identity using patient s name and date of birth. Please see Immunization Activity for additional information.     Screening Questionnaire for Adult Immunization    Are you sick today?   No   Do you have allergies to medications, food, a vaccine component or latex?   No   Have you ever had a serious reaction after receiving a vaccination?   No   Do you have a long-term health problem with heart, lung, kidney, or metabolic disease (e.g., diabetes), asthma, a blood disorder, no spleen, complement component deficiency, a cochlear implant, or a spinal fluid leak?  Are you on long-term aspirin therapy?   No   Do you have cancer, leukemia, HIV/AIDS, or any other immune system problem?   No   Do you have a parent, brother, or sister with an immune system problem?   No   In the past 3 months, have you taken medications that affect  your immune system, such as prednisone, other steroids, or anticancer drugs; drugs for the treatment of rheumatoid arthritis, Crohn s disease, or psoriasis; or have you had radiation treatments?   No   Have you had a seizure, or a brain or other nervous system problem?   No   During the past year, have you received a transfusion of blood or blood    products, or been given immune (gamma) globulin or antiviral drug?   No   For women: Are you pregnant or is there a chance you could become       pregnant during the next month?   No   Have you received any vaccinations in the past 4 weeks?   No     Immunization questionnaire answers were all negative.    I have reviewed the following standing orders:   This patient is due and qualifies for the Covid-19 vaccine.     Click here for COVID-19 Standing Order    I have reviewed the vaccines inclusion and exclusion criteria; No concerns regarding eligibility.     This patient is due and qualifies for the Hep A vaccine.    Click here for Hepatitis A Standing Order    I have reviewed the vaccines  inclusion and exclusion criteria; No concerns regarding eligibility.     Patient instructed to remain in clinic for 15 minutes afterwards, and to report any adverse reactions.     Screening performed by Bella Minaya CMA on 3/27/2025 at 10:12 AM.

## 2025-05-01 ENCOUNTER — OFFICE VISIT (OUTPATIENT)
Dept: URGENT CARE | Facility: URGENT CARE | Age: 45
End: 2025-05-01
Payer: COMMERCIAL

## 2025-05-01 VITALS
HEART RATE: 74 BPM | SYSTOLIC BLOOD PRESSURE: 96 MMHG | BODY MASS INDEX: 22 KG/M2 | OXYGEN SATURATION: 99 % | TEMPERATURE: 98.2 F | DIASTOLIC BLOOD PRESSURE: 65 MMHG | WEIGHT: 113.6 LBS | RESPIRATION RATE: 18 BRPM

## 2025-05-01 DIAGNOSIS — N89.8 VAGINAL DISCHARGE: ICD-10-CM

## 2025-05-01 DIAGNOSIS — B96.89 BV (BACTERIAL VAGINOSIS): ICD-10-CM

## 2025-05-01 DIAGNOSIS — N76.0 BV (BACTERIAL VAGINOSIS): ICD-10-CM

## 2025-05-01 DIAGNOSIS — B02.9 HERPES ZOSTER WITHOUT COMPLICATION: Primary | ICD-10-CM

## 2025-05-01 LAB
CLUE CELLS: PRESENT
TRICHOMONAS, WET PREP: ABNORMAL
WBC'S/HIGH POWER FIELD, WET PREP: ABNORMAL
YEAST, WET PREP: ABNORMAL

## 2025-05-01 RX ORDER — METRONIDAZOLE 500 MG/1
500 TABLET ORAL 2 TIMES DAILY
Qty: 14 TABLET | Refills: 0 | Status: SHIPPED | OUTPATIENT
Start: 2025-05-01 | End: 2025-05-08

## 2025-05-01 RX ORDER — VALACYCLOVIR HYDROCHLORIDE 1 G/1
1000 TABLET, FILM COATED ORAL 3 TIMES DAILY
Qty: 21 TABLET | Refills: 0 | Status: SHIPPED | OUTPATIENT
Start: 2025-05-01 | End: 2025-05-08

## 2025-05-01 NOTE — PATIENT INSTRUCTIONS
Valacyclovir treatment for shingles     Metronidazole for treatment of bacterial vaginosis - Start after you finish valacyclovir.

## 2025-05-01 NOTE — PROGRESS NOTES
Assessment & Plan     Herpes zoster without complication  She is within 72-hour window from onset of symptoms I advise starting Valtrex for treatment of shingles of the right perineum.  Also advised NSAIDs as needed for pain.  Also advised that the lesions are contagious while they are draining and to keep the area covered and wash hands after any contact with the lesions.  Also advised to avoid anyone who is immunocompromised while in the contagious phase of shingles.  Follow-up if symptoms are not improving or worsening.  - valACYclovir (VALTREX) 1000 mg tablet  Dispense: 21 tablet; Refill: 0    BV (bacterial vaginosis)  Wet prep positive for clue cells.  She has had increased vaginal discharge which she typically gets with BV.  She typically gets pretty severe diarrhea as a side effect of metronidazole and given she has the shingles coursing up the right perineum to the buttock I advised that she start the metronidazole after she completes the course of Valtrex as the diarrhea would irritate the shingles lesions and also increase her risk for secondary infection.  - metroNIDAZOLE (FLAGYL) 500 MG tablet  Dispense: 14 tablet; Refill: 0    Vaginal discharge  - Wet preparation         No follow-ups on file.    Yasmine Wyatt, ELLEN [facility] [place]    Evie Wheeler is a 45 year old female who presents to clinic today for the following health issues:  Chief Complaint   Patient presents with    Vaginal Problem     White discharge, burning sensation on skin, red rash appearance that is spreading. Pt tried neosporin.          5/1/2025     5:13 PM   Additional Questions   Roomed by Shanita   Accompanied by Self     HPI      Review of Systems  Constitutional, HEENT, cardiovascular, pulmonary, GI, , musculoskeletal, neuro, skin, endocrine and psych systems are negative, except as otherwise noted.      Objective    BP 96/65 (BP Location: Left arm, Cuff Size: Adult Regular)   Pulse 74    Temp 98.2  F (36.8  C) (Tympanic)   Resp 18   Wt 51.5 kg (113 lb 9.6 oz)   LMP 02/10/2025 (Exact Date)   SpO2 99%   BMI 22.00 kg/m    Physical Exam   GENERAL: alert and no distress  MS: no gross musculoskeletal defects noted, no edema  SKIN: Clusters of vesicular lesions coursing from the anterior right perineum to the right medial buttocks  NEURO: Normal strength and tone, mentation intact and speech normal  PSYCH: mentation appears normal, affect normal/bright    Results for orders placed or performed in visit on 05/01/25   Wet preparation     Status: Abnormal    Specimen: Vagina; Swab   Result Value Ref Range    Trichomonas Absent Absent    Yeast Absent Absent    Clue Cells Present (A) Absent    WBCs/high power field 3+ (A) None          Eloped (saw a physician/midlevel provider but left without telling anyone)

## 2025-05-01 NOTE — PROGRESS NOTES
Urgent Care Clinic Visit  Chief Complaint   Patient presents with    Vaginal Problem     White discharge, burning sensation on skin, red rash appearance that is spreading. Pt tried neosporin.                5/1/2025     5:13 PM   Additional Questions   Roomed by Shanita   Accompanied by Self     Pre-Provider Visit Orders - Vaginal Infection  Reason for visit:  Vaginal discharge

## 2025-05-11 ENCOUNTER — HEALTH MAINTENANCE LETTER (OUTPATIENT)
Age: 45
End: 2025-05-11

## (undated) DEVICE — PREP CHLORAPREP 26ML TINTED ORANGE  260815

## (undated) DEVICE — ESU GROUND PAD UNIVERSAL W/O CORD

## (undated) DEVICE — SU MONOCRYL 0 CT-1 36" Y346H

## (undated) DEVICE — SUCTION CANISTER MEDIVAC LINER 1500ML W/LID 65651-515

## (undated) DEVICE — SOL ADH LIQUID BENZOIN SWAB 0.6ML C1544

## (undated) DEVICE — BASIN SET MAJOR

## (undated) DEVICE — SOL NACL 0.9% IRRIG 1000ML BOTTLE 07138-09

## (undated) DEVICE — BNDG ABDOMINAL BINDER 10X26-50" 08140145

## (undated) DEVICE — ADH FLOSEAL W/HUMAN THROMBIN 5ML 1503350

## (undated) DEVICE — SU MONOCRYL 0 CTB-1 36" YB946

## (undated) DEVICE — SPECIMEN CONTAINER W/10% BUFFERED FORMALIN 120ML 591201

## (undated) DEVICE — SU VICRYL 4-0 PS-2 18" UND J496G

## (undated) DEVICE — SOL WATER IRRIG 1000ML BOTTLE 07139-09

## (undated) DEVICE — STRAP KNEE/BODY 31143004

## (undated) DEVICE — SU VICRYL 0 CT-1 36" J346H

## (undated) DEVICE — STOCKING SLEEVE COMPRESSION CALF LG

## (undated) DEVICE — BARRIER INTERCEED 3X4" 4350

## (undated) DEVICE — GLOVE PROTEXIS BLUE W/NEU-THERA 6.5  2D73EB65

## (undated) DEVICE — PACK C-SECTION LF PL15OTA83B

## (undated) DEVICE — DRSG STERI STRIP 1/4X3" R1541

## (undated) DEVICE — GLOVE ESTEEM POWDER FREE SMT 7.5  2D72PT75

## (undated) DEVICE — CATH TRAY FOLEY 16FR SILICONE 907416

## (undated) DEVICE — GLOVE ESTEEM POWDER FREE SMT 6.5  2D72PT65

## (undated) DEVICE — DRSG STERI STRIP 1/2X4" R1547

## (undated) DEVICE — CATH TRAY FOLEY 16FR BARDEX W/DRAIN BAG STATLOCK 300316A

## (undated) DEVICE — SU VICRYL 0 CTB-1 36" UND JB946

## (undated) RX ORDER — BUPIVACAINE HYDROCHLORIDE 2.5 MG/ML
INJECTION, SOLUTION EPIDURAL; INFILTRATION; INTRACAUDAL
Status: DISPENSED
Start: 2022-10-26

## (undated) RX ORDER — LIDOCAINE 50 MG/G
OINTMENT TOPICAL
Status: DISPENSED
Start: 2022-07-05

## (undated) RX ORDER — OXYTOCIN/0.9 % SODIUM CHLORIDE 30/500 ML
PLASTIC BAG, INJECTION (ML) INTRAVENOUS
Status: DISPENSED
Start: 2022-10-26

## (undated) RX ORDER — TRANEXAMIC ACID 10 MG/ML
INJECTION, SOLUTION INTRAVENOUS
Status: DISPENSED
Start: 2022-10-26

## (undated) RX ORDER — EPINEPHRINE 1 MG/ML
INJECTION, SOLUTION, CONCENTRATE INTRAVENOUS
Status: DISPENSED
Start: 2022-10-26

## (undated) RX ORDER — KETOROLAC TROMETHAMINE 30 MG/ML
INJECTION, SOLUTION INTRAMUSCULAR; INTRAVENOUS
Status: DISPENSED
Start: 2022-10-26

## (undated) RX ORDER — FENTANYL CITRATE 50 UG/ML
INJECTION, SOLUTION INTRAMUSCULAR; INTRAVENOUS
Status: DISPENSED
Start: 2022-10-26

## (undated) RX ORDER — MORPHINE SULFATE 1 MG/ML
INJECTION, SOLUTION EPIDURAL; INTRATHECAL; INTRAVENOUS
Status: DISPENSED
Start: 2022-10-26

## (undated) RX ORDER — PHENYLEPHRINE HCL IN 0.9% NACL 50MG/250ML
PLASTIC BAG, INJECTION (ML) INTRAVENOUS
Status: DISPENSED
Start: 2022-10-26